# Patient Record
Sex: FEMALE | Race: BLACK OR AFRICAN AMERICAN | Employment: UNEMPLOYED | URBAN - METROPOLITAN AREA
[De-identification: names, ages, dates, MRNs, and addresses within clinical notes are randomized per-mention and may not be internally consistent; named-entity substitution may affect disease eponyms.]

---

## 2017-01-14 ENCOUNTER — HOSPITAL ENCOUNTER (EMERGENCY)
Age: 32
Discharge: HOME | End: 2017-01-14
Attending: EMERGENCY MEDICINE
Payer: MEDICAID

## 2017-01-14 VITALS
WEIGHT: 143 LBS | OXYGEN SATURATION: 100 % | TEMPERATURE: 98.3 F | RESPIRATION RATE: 19 BRPM | SYSTOLIC BLOOD PRESSURE: 99 MMHG | HEIGHT: 65 IN | DIASTOLIC BLOOD PRESSURE: 64 MMHG | HEART RATE: 72 BPM | BODY MASS INDEX: 23.82 KG/M2

## 2017-01-14 DIAGNOSIS — F17.200 SMOKER: ICD-10-CM

## 2017-01-14 DIAGNOSIS — J20.9 ACUTE BRONCHITIS, UNSPECIFIED ORGANISM: Primary | ICD-10-CM

## 2017-01-14 DIAGNOSIS — J45.21 MILD INTERMITTENT ASTHMA WITH ACUTE EXACERBATION: ICD-10-CM

## 2017-01-14 DIAGNOSIS — J02.9 SORE THROAT: ICD-10-CM

## 2017-01-14 LAB
APPEARANCE UR: ABNORMAL
BACTERIA URNS QL MICRO: NEGATIVE /HPF
BILIRUB UR QL: NEGATIVE
COLOR UR: ABNORMAL
DEPRECATED S PYO AG THROAT QL EIA: NEGATIVE
EPITH CASTS URNS QL MICRO: ABNORMAL /LPF
GLUCOSE UR STRIP.AUTO-MCNC: NEGATIVE MG/DL
HGB UR QL STRIP: NEGATIVE
KETONES UR QL STRIP.AUTO: NEGATIVE MG/DL
LEUKOCYTE ESTERASE UR QL STRIP.AUTO: ABNORMAL
NITRITE UR QL STRIP.AUTO: NEGATIVE
PH UR STRIP: 6 [PH] (ref 5–8)
PROT UR STRIP-MCNC: NEGATIVE MG/DL
RBC #/AREA URNS HPF: ABNORMAL /HPF (ref 0–5)
SP GR UR REFRACTOMETRY: 1.01 (ref 1–1.03)
UA: UC IF INDICATED,UAUC: ABNORMAL
UROBILINOGEN UR QL STRIP.AUTO: 0.2 EU/DL (ref 0.2–1)
WBC URNS QL MICRO: ABNORMAL /HPF (ref 0–4)

## 2017-01-14 PROCEDURE — 94640 AIRWAY INHALATION TREATMENT: CPT

## 2017-01-14 PROCEDURE — 74011000250 HC RX REV CODE- 250: Performed by: EMERGENCY MEDICINE

## 2017-01-14 PROCEDURE — 81001 URINALYSIS AUTO W/SCOPE: CPT | Performed by: EMERGENCY MEDICINE

## 2017-01-14 PROCEDURE — 87147 CULTURE TYPE IMMUNOLOGIC: CPT | Performed by: EMERGENCY MEDICINE

## 2017-01-14 PROCEDURE — 74011636637 HC RX REV CODE- 636/637: Performed by: EMERGENCY MEDICINE

## 2017-01-14 PROCEDURE — 94762 N-INVAS EAR/PLS OXIMTRY CONT: CPT

## 2017-01-14 PROCEDURE — 87070 CULTURE OTHR SPECIMN AEROBIC: CPT | Performed by: EMERGENCY MEDICINE

## 2017-01-14 PROCEDURE — 87880 STREP A ASSAY W/OPTIC: CPT | Performed by: EMERGENCY MEDICINE

## 2017-01-14 PROCEDURE — 77030013140 HC MSK NEB VYRM -A

## 2017-01-14 PROCEDURE — 99283 EMERGENCY DEPT VISIT LOW MDM: CPT

## 2017-01-14 RX ORDER — PREDNISONE 20 MG/1
20 TABLET ORAL DAILY
Qty: 5 TAB | Refills: 0 | Status: SHIPPED | OUTPATIENT
Start: 2017-01-14 | End: 2017-01-19

## 2017-01-14 RX ORDER — PROMETHAZINE HYDROCHLORIDE AND CODEINE PHOSPHATE 6.25; 1 MG/5ML; MG/5ML
5 SOLUTION ORAL
Qty: 75 ML | Refills: 0 | Status: SHIPPED | OUTPATIENT
Start: 2017-01-14 | End: 2017-01-25 | Stop reason: ALTCHOICE

## 2017-01-14 RX ORDER — FLUCONAZOLE 150 MG/1
150 TABLET ORAL ONCE
Qty: 1 TAB | Refills: 1 | Status: SHIPPED | OUTPATIENT
Start: 2017-01-14 | End: 2017-01-14

## 2017-01-14 RX ORDER — ALBUTEROL SULFATE 0.83 MG/ML
2.5 SOLUTION RESPIRATORY (INHALATION)
Qty: 24 EACH | Refills: 0 | Status: SHIPPED
Start: 2017-01-14 | End: 2017-01-14

## 2017-01-14 RX ORDER — IPRATROPIUM BROMIDE AND ALBUTEROL SULFATE 2.5; .5 MG/3ML; MG/3ML
3 SOLUTION RESPIRATORY (INHALATION)
Status: COMPLETED | OUTPATIENT
Start: 2017-01-14 | End: 2017-01-14

## 2017-01-14 RX ORDER — ALBUTEROL SULFATE 0.83 MG/ML
2.5 SOLUTION RESPIRATORY (INHALATION)
Qty: 24 EACH | Refills: 0 | Status: SHIPPED | OUTPATIENT
Start: 2017-01-14 | End: 2017-09-20

## 2017-01-14 RX ORDER — PREDNISONE 20 MG/1
60 TABLET ORAL
Status: COMPLETED | OUTPATIENT
Start: 2017-01-14 | End: 2017-01-14

## 2017-01-14 RX ORDER — PREDNISONE 20 MG/1
20 TABLET ORAL DAILY
Qty: 5 TAB | Refills: 0 | Status: SHIPPED | OUTPATIENT
Start: 2017-01-14 | End: 2017-01-14

## 2017-01-14 RX ORDER — AZITHROMYCIN 250 MG/1
TABLET, FILM COATED ORAL
Qty: 6 TAB | Refills: 0 | Status: SHIPPED | OUTPATIENT
Start: 2017-01-14 | End: 2017-01-25

## 2017-01-14 RX ORDER — AZITHROMYCIN 250 MG/1
TABLET, FILM COATED ORAL
Qty: 6 TAB | Refills: 0 | Status: SHIPPED | OUTPATIENT
Start: 2017-01-14 | End: 2017-01-14

## 2017-01-14 RX ADMIN — IPRATROPIUM BROMIDE AND ALBUTEROL SULFATE 3 ML: .5; 3 SOLUTION RESPIRATORY (INHALATION) at 07:46

## 2017-01-14 RX ADMIN — PREDNISONE 60 MG: 20 TABLET ORAL at 07:46

## 2017-01-14 NOTE — DISCHARGE INSTRUCTIONS
Learning About Asthma Triggers  What are asthma triggers? When you have asthma, certain things can make your symptoms worse. These are called triggers. Learn what triggers an asthma attack for you, and avoid the triggers when you can. Common triggers include colds, smoke, air pollution, dust, pollen, pets, stress, and cold air. How do asthma triggers affect you? Triggers can make it harder for your lungs to work as they should. They can lead to sudden breathing problems and other symptoms. When you are around a trigger, an asthma attack is more likely. If your symptoms are severe, you may need emergency treatment or have to go to the hospital for treatment. What can you do to avoid triggers? The first thing is to know your triggers. When you are having symptoms, note the things around you that might be causing them. Then look for patterns that may be triggering your symptoms. Record your triggers on a piece of paper or in an asthma diary. When you have your list of possible triggers, work with your doctor to find ways to avoid them. Avoid colds and flu. Get a pneumococcal vaccine shot. If you have had one before, ask your doctor whether you need a second dose. Get a flu vaccine every year, as soon as it's available. If you must be around people with colds or the flu, wash your hands often. Here are some ways to avoid a few common triggers. · Do not smoke or allow others to smoke around you. If you need help quitting, talk to your doctor about stop-smoking programs and medicines. These can increase your chances of quitting for good. · If there is a lot of pollution, pollen, or dust outside, stay at home and keep your windows closed. Use an air conditioner or air filter in your home. Check your local weather report or newspaper for air quality and pollen reports. What else should you know? · Take your controller medicine every day, not just when you have symptoms.  It helps prevent problems before they occur. · Your doctor may suggest that you check how well your lungs are working by measuring your peak expiratory flow (PEF) throughout the day. Your PEF may drop when you are near things that trigger symptoms. Where can you learn more? Go to http://jelani-daniel.info/. Enter Y422 in the search box to learn more about \"Learning About Asthma Triggers. \"  Current as of: May 23, 2016  Content Version: 11.1  © 8165-9554 [employer]. Care instructions adapted under license by [employer] (which disclaims liability or warranty for this information). If you have questions about a medical condition or this instruction, always ask your healthcare professional. [address] any warranty or liability for your use of this information. Bronchitis: Care Instructions  Your Care Instructions    Bronchitis is inflammation of the bronchial tubes, which carry air to the lungs. The tubes swell and produce mucus, or phlegm. The mucus and inflamed bronchial tubes make you cough. You may have trouble breathing. Most cases of bronchitis are caused by viruses like those that cause colds. Antibiotics usually do not help and they may be harmful. Bronchitis usually develops rapidly and lasts about 2 to 3 weeks in otherwise healthy people. Follow-up care is a key part of your treatment and safety. Be sure to make and go to all appointments, and call your doctor if you are having problems. It's also a good idea to know your test results and keep a list of the medicines you take. How can you care for yourself at home? · Take all medicines exactly as prescribed. Call your doctor if you think you are having a problem with your medicine. · Get some extra rest.  · Take an over-the-counter pain medicine, such as acetaminophen (Tylenol), ibuprofen (Advil, Motrin), or naproxen (Aleve) to reduce fever and relieve body aches.  Read and follow all instructions on the label.  · Do not take two or more pain medicines at the same time unless the doctor told you to. Many pain medicines have acetaminophen, which is Tylenol. Too much acetaminophen (Tylenol) can be harmful. · Take an over-the-counter cough medicine that contains dextromethorphan to help quiet a dry, hacking cough so that you can sleep. Avoid cough medicines that have more than one active ingredient. Read and follow all instructions on the label. · Breathe moist air from a humidifier, hot shower, or sink filled with hot water. The heat and moisture will thin mucus so you can cough it out. · Do not smoke. Smoking can make bronchitis worse. If you need help quitting, talk to your doctor about stop-smoking programs and medicines. These can increase your chances of quitting for good. When should you call for help? Call 911 anytime you think you may need emergency care. For example, call if:  · You have severe trouble breathing. Call your doctor now or seek immediate medical care if:  · You have new or worse trouble breathing. · You cough up dark brown or bloody mucus (sputum). · You have a new or higher fever. · You have a new rash. Watch closely for changes in your health, and be sure to contact your doctor if:  · You cough more deeply or more often, especially if you notice more mucus or a change in the color of your mucus. · You are not getting better as expected. Where can you learn more? Go to http://jelani-daniel.info/. Enter H333 in the search box to learn more about \"Bronchitis: Care Instructions. \"  Current as of: May 23, 2016  Content Version: 11.1  © 3741-2903 [employer]. Care instructions adapted under license by [employer] (which disclaims liability or warranty for this information).  If you have questions about a medical condition or this instruction, always ask your healthcare professional. [place] [address] any warranty or liability for your use of this information. Sore Throat: Care Instructions  Your Care Instructions    Infection by bacteria or a virus causes most sore throats. Cigarette smoke, dry air, air pollution, allergies, and yelling can also cause a sore throat. Sore throats can be painful and annoying. Fortunately, most sore throats go away on their own. If you have a bacterial infection, your doctor may prescribe antibiotics. Follow-up care is a key part of your treatment and safety. Be sure to make and go to all appointments, and call your doctor if you are having problems. It's also a good idea to know your test results and keep a list of the medicines you take. How can you care for yourself at home? · If your doctor prescribed antibiotics, take them as directed. Do not stop taking them just because you feel better. You need to take the full course of antibiotics. · Gargle with warm salt water once an hour to help reduce swelling and relieve discomfort. Use 1 teaspoon of salt mixed in 1 cup of warm water. · Take an over-the-counter pain medicine, such as acetaminophen (Tylenol), ibuprofen (Advil, Motrin), or naproxen (Aleve). Read and follow all instructions on the label. · Be careful when taking over-the-counter cold or flu medicines and Tylenol at the same time. Many of these medicines have acetaminophen, which is Tylenol. Read the labels to make sure that you are not taking more than the recommended dose. Too much acetaminophen (Tylenol) can be harmful. · Drink plenty of fluids. Fluids may help soothe an irritated throat. Hot fluids, such as tea or soup, may help decrease throat pain. · Use over-the-counter throat lozenges to soothe pain. Regular cough drops or hard candy may also help. These should not be given to young children because of the risk of choking. · Do not smoke or allow others to smoke around you. If you need help quitting, talk to your doctor about stop-smoking programs and medicines.  These can increase your chances of quitting for good. · Use a vaporizer or humidifier to add moisture to your bedroom. Follow the directions for cleaning the machine. When should you call for help? Call your doctor now or seek immediate medical care if:  · You have new or worse trouble swallowing. · Your sore throat gets much worse on one side. Watch closely for changes in your health, and be sure to contact your doctor if you do not get better as expected. Where can you learn more? Go to http://jelani-daniel.info/. Enter 062 [id number] in the search box to learn more about \"Sore Throat: Care Instructions. \"  Current as of: July 29, 2016  Content Version: 11.1  © 4828-6633 [employer]. Care instructions adapted under license by [employer] (which disclaims liability or warranty for this information). If you have questions about a medical condition or this instruction, always ask your healthcare professional. [place] [address] any warranty or liability for your use of this information. Stopping Smoking: Care Instructions  Your Care Instructions  Cigarette smokers crave the nicotine in cigarettes. Giving it up is much harder than simply changing a habit. Your body has to stop craving the nicotine. It is hard to quit, but you can do it. There are many tools that people use to quit smoking. You may find that combining tools works best for you. There are several steps to quitting. First you get ready to quit. Then you get support to help you. After that, you learn new skills and behaviors to become a nonsmoker. For many people, a necessary step is getting and using medicine. Your doctor will help you set up the plan that best meets your needs. You may want to attend a smoking cessation program to help you quit smoking. When you choose a program, look for one that has proven success. Ask your doctor for ideas.  You will greatly increase your chances of success if you take medicine as well as get counseling or join a cessation program.  Some of the changes you feel when you first quit tobacco are uncomfortable. Your body will miss the nicotine at first, and you may feel short-tempered and grumpy. You may have trouble sleeping or concentrating. Medicine can help you deal with these symptoms. You may struggle with changing your smoking habits and rituals. The last step is the tricky one: Be prepared for the smoking urge to continue for a time. This is a lot to deal with, but keep at it. You will feel better. Follow-up care is a key part of your treatment and safety. Be sure to make and go to all appointments, and call your doctor if you are having problems. Its also a good idea to know your test results and keep a list of the medicines you take. How can you care for yourself at home? · Ask your family, friends, and coworkers for support. You have a better chance of quitting if you have help and support. · Join a support group, such as Nicotine Anonymous, for people who are trying to quit smoking. · Consider signing up for a smoking cessation program, such as the American Lung Association's Freedom from Smoking program.  · Set a quit date. Pick your date carefully so that it is not right in the middle of a big deadline or stressful time. Once you quit, do not even take a puff. Get rid of all ashtrays and lighters after your last cigarette. Clean your house and your clothes so that they do not smell of smoke. · Learn how to be a nonsmoker. Think about ways you can avoid those things that make you reach for a cigarette. ¨ Avoid situations that put you at greatest risk for smoking. For some people, it is hard to have a drink with friends without smoking. For others, they might skip a coffee break with coworkers who smoke. ¨ Change your daily routine. Take a different route to work or eat a meal in a different place. · Cut down on stress.  Calm yourself or release tension by doing an activity you enjoy, such as reading a book, taking a hot bath, or gardening. · Talk to your doctor or pharmacist about nicotine replacement therapy, which replaces the nicotine in your body. You still get nicotine but you do not use tobacco. Nicotine replacement products help you slowly reduce the amount of nicotine you need. These products come in several forms, many of them available over-the-counter:  ¨ Nicotine patches  ¨ Nicotine gum and lozenges  ¨ Nicotine inhaler  · Ask your doctor about bupropion (Wellbutrin) or varenicline (Chantix), which are prescription medicines. They do not contain nicotine. They help you by reducing withdrawal symptoms, such as stress and anxiety. · Some people find hypnosis, acupuncture, and massage helpful for ending the smoking habit. · Eat a healthy diet and get regular exercise. Having healthy habits will help your body move past its craving for nicotine. · Be prepared to keep trying. Most people are not successful the first few times they try to quit. Do not get mad at yourself if you smoke again. Make a list of things you learned and think about when you want to try again, such as next week, next month, or next year. Where can you learn more? Go to http://jelani-daniel.info/. Enter K002 in the search box to learn more about \"Stopping Smoking: Care Instructions. \"  Current as of: May 26, 2016  Content Version: 11.1  © 5191-9415 [employer]. Care instructions adapted under license by [employer] (which disclaims liability or warranty for this information). If you have questions about a medical condition or this instruction, always ask your healthcare professional. [place] [address] any warranty or liability for your use of this information.

## 2017-01-14 NOTE — ED NOTES
Emergency Department Nursing Plan of Care       The Nursing Plan of Care is developed from the Nursing assessment and Emergency Department Attending provider initial evaluation. The plan of care may be reviewed in the ED Provider note.     The Plan of Care was developed with the following considerations:   Patient / Family readiness to learn indicated by:verbalized understanding  Persons(s) to be included in education: patient  Barriers to Learning/Limitations:No    Signed     Rupesh Godfrey RN    1/14/2017   7:42 AM

## 2017-01-14 NOTE — ED PROVIDER NOTES
HPI Comments: Ness Davis is a 32 y.o. female with PMHx significant for asthma, bipolar disorder, back pain, and scoliosis presenting ambulatory to [facility] ED with c/o a productive cough (yellow phlegm), wheezing, and a sore throat that began 2 days ago. She notes additional sx of a sharp 9/10 chest pain secondary to coughing. She states that she took mucinex DM with no relief of her sx. She reports that she has been on ABX for a week and expresses concern for a possible UTI. She notes experiencing white vaginal discharge. She denies fever, nausea, vomiting, or taking any medications today. PCP: Leeann Wang MD  PSHx: tonsillectomy  Social History:  (+) Tobacco (1ppd x 2 years),   (-) EtOH,      (-) Drugs     There are no other complaints, changes, or physical findings at this time. The history is provided by the patient. Past Medical History:   Diagnosis Date    Asthma     Back pain     Bipolar 1 disorder ([address]     Gastrointestinal disorder      IBS    Other ill-defined conditions(799.89)      Frequent Boils    Scoliosis        Past Surgical History:   Procedure Laterality Date    Hx tonsillectomy      Hx wisdom teeth extraction           Family History:   Problem Relation Age of Onset    Cancer Neg Hx     Hypertension Neg Hx     Diabetes Neg Hx     Stroke Neg Hx        Social History     Social History    Marital status: SINGLE     Spouse name: N/A    Number of children: N/A    Years of education: N/A     Occupational History    Not on file.      Social History Main Topics    Smoking status: Current Every Day Smoker     Packs/day: 1.00     Years: 2.00     Last attempt to quit: 8/5/2013    Smokeless tobacco: Never Used    Alcohol use No    Drug use: No    Sexual activity: Yes     Partners: Male     Birth control/ protection: Injection     Other Topics Concern    Not on file     Social History Narrative         ALLERGIES: Naprosyn [naproxen]    Review of Systems Constitutional: Negative. Negative for chills and fever. HENT: Positive for sore throat. Negative for congestion and rhinorrhea. Eyes: Negative. Negative for visual disturbance. Respiratory: Positive for cough and wheezing. Negative for shortness of breath. Cardiovascular: Negative. Negative for chest pain. Gastrointestinal: Negative. Negative for abdominal pain, nausea and vomiting. Genitourinary: Positive for vaginal discharge. Negative for difficulty urinating and dysuria. Musculoskeletal: Negative. Negative for arthralgias, back pain and neck pain. Skin: Negative. Negative for color change and rash. Neurological: Negative. Negative for dizziness, weakness and headaches. All other systems reviewed and are negative. Vitals:    01/14/17 0726 01/14/17 0756 01/14/17 0804   BP: 99/64     Pulse: 72     Resp: 19     Temp: 98.3 °F (36.8 °C)     SpO2: 100% 100% 100%   Weight: 64.9 kg (143 lb)     Height: 5' 5\" (1.651 m)              Physical Exam   Constitutional: She is oriented to person, place, and time. She appears well-developed and well-nourished. The pt is able to speak in full sentences. HENT:   Mouth/Throat: Posterior oropharyngeal edema (mild) present. Neck: Normal range of motion. Cardiovascular: Normal rate, regular rhythm, normal heart sounds and intact distal pulses. Pulmonary/Chest: Effort normal. She has wheezes (mild). She has rhonchi (scattered). Abdominal: Soft. Bowel sounds are normal.   Lymphadenopathy:     She has no cervical adenopathy. No adenopathy. Neurological: She is alert and oriented to person, place, and time. Skin: Skin is warm and dry. Nursing note and vitals reviewed.        MDM  Number of Diagnoses or Management Options  Acute bronchitis, unspecified organism:   Mild intermittent asthma with acute exacerbation:   Smoker:   Sore throat:   Diagnosis management comments:   DDx: Bronchitis, URI, PNA, yeast infection        Amount and/or Complexity of Data Reviewed  Clinical lab tests: ordered and reviewed  Review and summarize past medical records: yes    Patient Progress  Patient progress: stable    ED Course       Procedures     8:05 AM  The patient states that their symptoms have resolved and they feel much better. There are no other new complaints at this time. Her questions have been answered. 08:15 AM  Yue Galloway's final results have been reviewed with her. She has been counseled regarding her diagnosis. She verbally conveys understanding and agreement of the signs, symptoms, diagnosis, treatment and prognosis . She is stable for discharge. LABORATORY TESTS:  Recent Results (from the past 12 hour(s))   STREP AG SCREEN, GROUP A    Collection Time: 01/14/17  7:38 AM   Result Value Ref Range    Group A Strep Ag ID NEGATIVE  NEG     URINALYSIS W/ REFLEX CULTURE    Collection Time: 01/14/17  7:47 AM   Result Value Ref Range    Color YELLOW/STRAW      Appearance CLOUDY (A) CLEAR      Specific gravity 1.015 1.003 - 1.030      pH (UA) 6.0 5.0 - 8.0      Protein NEGATIVE  NEG mg/dL    Glucose NEGATIVE  NEG mg/dL    Ketone NEGATIVE  NEG mg/dL    Bilirubin NEGATIVE  NEG      Blood NEGATIVE  NEG      Urobilinogen 0.2 0.2 - 1.0 EU/dL    Nitrites NEGATIVE  NEG      Leukocyte Esterase SMALL (A) NEG      WBC 0-4 0 - 4 /hpf    RBC 0-5 0 - 5 /hpf    Epithelial cells FEW FEW /lpf    Bacteria NEGATIVE  NEG /hpf    UA:UC IF INDICATED CULTURE NOT INDICATED BY UA RESULT CNI       MEDICATIONS GIVEN:  Medications   albuterol-ipratropium (DUO-NEB) 2.5 MG-0.5 MG/3 ML (3 mL Nebulization Given 1/14/17 0746)   predniSONE (DELTASONE) tablet 60 mg (60 mg Oral Given 1/14/17 0746)       IMPRESSION:  1. Acute bronchitis, unspecified organism    2. Mild intermittent asthma with acute exacerbation    3. Sore throat    4. Smoker        PLAN:  1.    Current Discharge Medication List      START taking these medications    Details   promethazine-codeine (PHENERGAN WITH CODEINE) 6.25-10 mg/5 mL syrup Take 5 mL by mouth four (4) times daily as needed for Cough. Max Daily Amount: 20 mL. Indications: COLD SYMPTOMS, COUGH, Nasal Congestion  Qty: 75 mL, Refills: 0      azithromycin (ZITHROMAX Z-LUCIO) 250 mg tablet tad  Qty: 6 Tab, Refills: 0      fluconazole (DIFLUCAN) 150 mg tablet Take 1 Tab by mouth once for 1 dose. May repeat dose in 7days  Indications: yeast  Qty: 1 Tab, Refills: 1         CONTINUE these medications which have NOT CHANGED    Details   QUEtiapine (SEROQUEL) 50 mg tablet       nicotine (NICODERM CQ) 21 mg/24 hr       medroxyPROGESTERone (DEPO-PROVERA) 150 mg/mL injection 1 mL by IntraMUSCular route every three (3) months. Qty: 1 mL, Refills: 3    Comments: Do NOT fill till 10/6/16  Associated Diagnoses: Irregular menses      ALPRAZolam (XANAX) 1 mg tablet Take 1 mg by mouth two (2) times a day. Indications: ANXIETY      albuterol (PROVENTIL HFA, VENTOLIN HFA, PROAIR HFA) 90 mcg/actuation inhaler Take 2 Puffs by inhalation every six (6) hours as needed for Wheezing. OXcarbazepine (TRILEPTAL) 300 mg tablet Please take 1 tablet twice a day for 4 days and then take three times a day. Qty: 90 Tab, Refills: 0         STOP taking these medications       traMADol (ULTRAM) 50 mg tablet Comments:   Reason for Stoppin.   Follow-up Information     Follow up With Details Comments Contact Info    [facility] EMERGENCY DEPT  If symptoms worsen [address]    David Parker MD Schedule an appointment as soon as possible for a visit If symptoms worsen next week [facility]  [address] [facility] [address]  [phone]          Return to ED if worse     DISCHARGE NOTE  8:17 AM  The patient has been re-evaluated and is ready for discharge. Reviewed available results with patient. Counseled pt on diagnosis and care plan. Pt has expressed understanding, and all questions have been answered.  Pt agrees with plan and agrees to F/U as recommended, or return to the ED if their sxs worsen. Discharge instructions have been provided and explained to the pt, along with reasons to return to the ED. This note is prepared by Nusrat Solis, acting as Scribe for Verena Ely MD.    Verena Ely MD: The scribe's documentation has been prepared under my direction and personally reviewed by me in its entirety. I confirm that the note above accurately reflects all work, treatment, procedures, and medical decision making performed by me.

## 2017-01-14 NOTE — ED NOTES
Per pt reports productive cough with yellow thick sputum x 2 days, chills, wheezing and vaginal discharge that is white and thick in color after taking antibiotics for 1 week. Pt isn't concerned about STD and states that it's a yeast infection. Pt denies SOB and CP.

## 2017-01-14 NOTE — LETTER
[facility] EMERGENCY DEPT 
[address] [facility] [address] [id number] [phone] Work/School Note Date: 1/14/2017 To Whom It May concern: 
 
Dylna Cruz was seen and treated today in the emergency room by the following provider(s): 
Attending Provider: eSlvin Marrero MD.   
 
Dylan Cruz may return to work on 01/18/2017. Sincerely, Selvin Marrero MD

## 2017-01-16 LAB
BACTERIA SPEC CULT: NORMAL
BACTERIA SPEC CULT: NORMAL
SERVICE CMNT-IMP: NORMAL

## 2017-01-25 ENCOUNTER — HOSPITAL ENCOUNTER (EMERGENCY)
Age: 32
Discharge: HOME | End: 2017-01-25
Attending: EMERGENCY MEDICINE
Payer: MEDICAID

## 2017-01-25 VITALS
OXYGEN SATURATION: 98 % | BODY MASS INDEX: 24.16 KG/M2 | HEART RATE: 90 BPM | WEIGHT: 145 LBS | SYSTOLIC BLOOD PRESSURE: 99 MMHG | TEMPERATURE: 98.7 F | HEIGHT: 65 IN | RESPIRATION RATE: 16 BRPM | DIASTOLIC BLOOD PRESSURE: 70 MMHG

## 2017-01-25 DIAGNOSIS — S80.11XA CONTUSION OF CALF, RIGHT, INITIAL ENCOUNTER: ICD-10-CM

## 2017-01-25 DIAGNOSIS — S80.12XA CONTUSION OF CALF, LEFT, INITIAL ENCOUNTER: Primary | ICD-10-CM

## 2017-01-25 PROCEDURE — 99282 EMERGENCY DEPT VISIT SF MDM: CPT

## 2017-01-25 RX ORDER — TRAMADOL HYDROCHLORIDE 50 MG/1
50 TABLET ORAL
Qty: 12 TAB | Refills: 0 | Status: SHIPPED | OUTPATIENT
Start: 2017-01-25 | End: 2017-07-25
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
## 2017-01-25 NOTE — ED NOTES
Patient (s)  given copy of dc instructions and 1 paper script(s) and 0 electronic scripts. Patient (s)  verbalized understanding of instructions and script (s). Patient given a current medication reconciliation form and verbalized understanding of their medications. Patient (s) verbalized understanding of the importance of discussing medications with  his or her physician or clinic they will be following up with. Patient alert and oriented and in no acute distress. Patient discharged home ambulatory. Ice packs provided at discharge.

## 2017-01-25 NOTE — DISCHARGE INSTRUCTIONS
Bruises: Care Instructions  Your Care Instructions    Bruises occur when small blood vessels under the skin tear or rupture, most often from a twist, bump, or fall. Blood leaks into tissues under the skin and causes a black-and-blue spot that often turns colors, including purplish black, reddish blue, or yellowish green, as the bruise heals. Bruises hurt, but most are not serious and will go away on their own within 2 to 4 weeks. Sometimes, gravity causes them to spread down the body. A leg bruise usually will take longer to heal than a bruise on the face or arms. Follow-up care is a key part of your treatment and safety. Be sure to make and go to all appointments, and call your doctor if you are having problems. Its also a good idea to know your test results and keep a list of the medicines you take. How can you care for yourself at home? · Take pain medicines exactly as directed. ¨ If the doctor gave you a prescription medicine for pain, take it as prescribed. ¨ If you are not taking a prescription pain medicine, ask your doctor if you can take an over-the-counter medicine. · Put ice or a cold pack on the area for 10 to 20 minutes at a time. Put a thin cloth between the ice and your skin. · If you can, prop up the bruised area on pillows as much as possible for the next few days. Try to keep the bruise above the level of your heart. When should you call for help? Call your doctor now or seek immediate medical care if:  · You have signs of infection, such as:  ¨ Increased pain, swelling, warmth, or redness. ¨ Red streaks leading from the bruise. ¨ Pus draining from the bruise. ¨ A fever. · You have a bruise on your leg and signs of a blood clot, such as:  ¨ Increasing redness and swelling along with warmth, tenderness, and pain in the bruised area. ¨ Pain in your calf, back of the knee, thigh, or groin. ¨ Redness and swelling in your leg or groin. · Your pain gets worse.   Watch closely for changes in your health, and be sure to contact your doctor if:  · You do not get better as expected. Where can you learn more? Go to http://jelani-daniel.info/. Enter [phone] [phone] in the search box to learn more about \"Bruises: Care Instructions. \"  Current as of: May 27, 2016  Content Version: 11.1  © 8163-0544 [employer]. Care instructions adapted under license by [employer] (which disclaims liability or warranty for this information). If you have questions about a medical condition or this instruction, always ask your healthcare professional. [place] [address] any warranty or liability for your use of this information.
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
## 2017-02-08 ENCOUNTER — OFFICE VISIT (OUTPATIENT)
Dept: INTERNAL MEDICINE CLINIC | Age: 32
End: 2017-02-08

## 2017-02-08 VITALS
DIASTOLIC BLOOD PRESSURE: 80 MMHG | HEIGHT: 65 IN | SYSTOLIC BLOOD PRESSURE: 110 MMHG | RESPIRATION RATE: 18 BRPM | HEART RATE: 72 BPM | WEIGHT: 143.8 LBS | BODY MASS INDEX: 23.96 KG/M2 | OXYGEN SATURATION: 100 % | TEMPERATURE: 98 F

## 2017-02-08 DIAGNOSIS — F31.9 BIPOLAR 1 DISORDER (HCC): Primary | ICD-10-CM

## 2017-02-08 RX ORDER — OXCARBAZEPINE 300 MG/1
300 TABLET, FILM COATED ORAL 3 TIMES DAILY
Qty: 90 TAB | Refills: 5 | Status: SHIPPED | OUTPATIENT
Start: 2017-02-08 | End: 2019-08-16 | Stop reason: SDUPTHER

## 2017-02-08 RX ORDER — QUETIAPINE FUMARATE 50 MG/1
TABLET, FILM COATED ORAL
Qty: 45 TAB | Refills: 5 | Status: SHIPPED | OUTPATIENT
Start: 2017-02-08 | End: 2017-09-20

## 2017-02-08 RX ORDER — ALPRAZOLAM 1 MG/1
1 TABLET ORAL 2 TIMES DAILY
Qty: 60 TAB | Refills: 0 | Status: SHIPPED | OUTPATIENT
Start: 2017-02-08 | End: 2017-03-08 | Stop reason: SDUPTHER

## 2017-02-08 NOTE — MR AVS SNAPSHOT
Visit Information Date & Time Provider Department Dept. Phone Encounter #  
 2/8/2017  9:15  [facility]  [id number] Follow-up Instructions Return in about 4 weeks (around 3/8/2017) for pain med. Upcoming Health Maintenance Date Due Pneumococcal 19-64 Medium Risk (1 of 1 - PPSV23) 8/3/2004 PAP AKA CERVICAL CYTOLOGY 4/22/2017 DTaP/Tdap/Td series (2 - Td) 5/21/2026 Allergies as of 2/8/2017  Review Complete On: 2/8/2017 By: 200 Chloé Chapman MD  
  
 Severity Noted Reaction Type Reactions Naprosyn [Naproxen] Medium 12/29/2013    Nausea and Vomiting Current Immunizations  Reviewed on 5/10/2013 Name Date  
 TD Vaccine 11/19/2012  3:24 AM  
 Tdap 5/21/2016  9:17 AM  
  
 Not reviewed this visit You Were Diagnosed With   
  
 Codes Comments Bipolar 1 disorder (RUST 75.)    -  Primary ICD-10-CM: F31.9 ICD-9-CM: 296.7 Vitals BP Pulse Temp Resp Height(growth percentile) Weight(growth percentile) 110/80 (BP 1 Location: Left arm, BP Patient Position: Sitting) 72 98 °F (36.7 °C) (Oral) 18 5' 5\" (1.651 m) 143 lb 12.8 oz (65.2 kg) SpO2 BMI OB Status Smoking Status 100% 23.93 kg/m2 Injection Current Every Day Smoker Vitals History BMI and BSA Data Body Mass Index Body Surface Area  
 23.93 kg/m 2 1.73 m 2 Preferred Pharmacy Pharmacy Name Phone [email or web address], [address] [phone] Your Updated Medication List  
  
   
This list is accurate as of: 2/8/17 10:09 AM.  Always use your most recent med list.  
  
  
  
  
 albuterol 2.5 mg /3 mL (0.083 %) nebulizer solution Commonly known as:  PROVENTIL VENTOLIN  
3 mL by Nebulization route every four (4) hours as needed for Wheezing or Shortness of Breath. ALPRAZolam 1 mg tablet Commonly known as:  [place] Tri Take 1 Tab by mouth two (2) times a day. Max Daily Amount: 2 mg. Indications: ANXIETY  
  
 medroxyPROGESTERone 150 mg/mL injection Commonly known as:  DEPO-PROVERA  
1 mL by IntraMUSCular route every three (3) months. nicotine 21 mg/24 hr  
Commonly known as:  [employer] OXcarbazepine 300 mg tablet Commonly known as:  TRILEPTAL Take 1 Tab by mouth three (3) times daily. Please take 1 tablet twice a day for 4 days and then take three times a day. QUEtiapine 50 mg tablet Commonly known as:  SEROquel Take 1/2 tab in am and 1 tab in pm  
  
 traMADol 50 mg tablet Commonly known as:  ULTRAM  
Take 1 Tab by mouth every six (6) hours as needed for Pain. Max Daily Amount: 200 mg. Prescriptions Printed Refills ALPRAZolam (XANAX) 1 mg tablet 0 Sig: Take 1 Tab by mouth two (2) times a day. Max Daily Amount: 2 mg. Indications: ANXIETY Class: Print Route: Oral  
  
Prescriptions Sent to Pharmacy Refills QUEtiapine (SEROQUEL) 50 mg tablet 5 Sig: Take 1/2 tab in am and 1 tab in pm  
 Class: Normal  
 Pharmacy: [email or web address] [address] Ph #: [phone] OXcarbazepine (TRILEPTAL) 300 mg tablet 5 Sig: Take 1 Tab by mouth three (3) times daily. Please take 1 tablet twice a day for 4 days and then take three times a day. Class: Normal  
 Pharmacy: [email or web address] [address] Ph #: [phone] Route: Oral  
  
We Performed the Following PAIN MGMT PANEL W/REFL, UR [[id number] Custom] Follow-up Instructions Return in about 4 weeks (around 3/8/2017) for pain med. Introducing [facility]! Romayne Duster introduces [employer] patient portal. Now you can access parts of your medical record, email your doctor's office, and request medication refills online. 1. In your internet browser, go to [email or web address] 2. Click on the First Time User? Click Here link in the Sign In box.  You will see the New Member Sign Up page. 3. Enter your [employer] Access Code exactly as it appears below. You will not need to use this code after youve completed the sign-up process. If you do not sign up before the expiration date, you must request a new code. · [employer] Access Code: [email or web address] Expires: 4/14/2017  7:20 AM 
 
4. Enter the last four digits of your Social Security Number (xxxx) and Date of Birth (mm/dd/yyyy) as indicated and click Submit. You will be taken to the next sign-up page. 5. Create a [employer] ID. This will be your [employer] login ID and cannot be changed, so think of one that is secure and easy to remember. 6. Create a [employer] password. You can change your password at any time. 7. Enter your Password Reset Question and Answer. This can be used at a later time if you forget your password. 8. Enter your e-mail address. You will receive e-mail notification when new information is available in [address]. 9. Click Sign Up. You can now view and download portions of your medical record. 10. Click the Download Summary menu link to download a portable copy of your medical information. If you have questions, please visit the Frequently Asked Questions section of the [employer] website. Remember, [employer] is NOT to be used for urgent needs. For medical emergencies, dial 911. Now available from your iPhone and Android! Please provide this summary of care documentation to your next provider. Your primary care clinician is listed as Shayy Palumbo. If you have any questions after today's visit, please call [phone].

## 2017-02-08 NOTE — PROGRESS NOTES
Dylan Cruz is a 32 y.o. female and presents with Medication Refill  . Pt's psychiatrist has retired and she needs refills on her meds. She is trying to find a new psychiatrist. She has 2 more days of medicine. Review of Systems  Constitutional: negative for fevers, chills, anorexia and weight loss  Eyes:   negative for visual disturbance and irritation  ENT:   negative for tinnitus,sore throat,nasal congestion,ear pains. hoarseness  Respiratory:  negative for cough, hemoptysis, dyspnea,wheezing  CV:   negative for chest pain, palpitations, lower extremity edema  GI:   negative for nausea, vomiting, diarrhea, abdominal pain,melena  Endo:               negative for polyuria,polydipsia,polyphagia,heat intolerance  Genitourinary: negative for frequency, dysuria and hematuria  Integument:  negative for rash and pruritus  Hematologic:  negative for easy bruising and gum/nose bleeding  Musculoskel: negative for myalgias, arthralgias, back pain, muscle weakness, joint pain  Neurological:  negative for headaches, dizziness, vertigo, memory problems and gait   Behavl/Psych: negative for feelings of anxiety, depression, mood changes    Past Medical History   Diagnosis Date    Asthma     Back pain     Bipolar 1 disorder (HCC)     Gastrointestinal disorder      IBS    Other ill-defined conditions(799.89)      Frequent Boils    Scoliosis      Past Surgical History   Procedure Laterality Date    Hx tonsillectomy      Hx wisdom teeth extraction       Social History     Social History    Marital status: SINGLE     Spouse name: N/A    Number of children: N/A    Years of education: N/A     Social History Main Topics    Smoking status: Current Every Day Smoker     Packs/day: 0.50     Years: 2.00     Last attempt to quit: 8/5/2013    Smokeless tobacco: Never Used    Alcohol use No    Drug use: No    Sexual activity: Yes     Partners: Male     Birth control/ protection: Injection     Other Topics Concern    None     Social History Narrative     Current Outpatient Prescriptions   Medication Sig Dispense Refill    QUEtiapine (SEROQUEL) 50 mg tablet Take 1/2 tab in am and 1 tab in pm 45 Tab 5    ALPRAZolam (XANAX) 1 mg tablet Take 1 Tab by mouth two (2) times a day. Max Daily Amount: 2 mg. Indications: ANXIETY 60 Tab 0    OXcarbazepine (TRILEPTAL) 300 mg tablet Take 1 Tab by mouth three (3) times daily. Please take 1 tablet twice a day for 4 days and then take three times a day. 90 Tab 5    albuterol (PROVENTIL VENTOLIN) 2.5 mg /3 mL (0.083 %) nebulizer solution 3 mL by Nebulization route every four (4) hours as needed for Wheezing or Shortness of Breath. 24 Each 0    medroxyPROGESTERone (DEPO-PROVERA) 150 mg/mL injection 1 mL by IntraMUSCular route every three (3) months. 1 mL 3    traMADol (ULTRAM) 50 mg tablet Take 1 Tab by mouth every six (6) hours as needed for Pain. Max Daily Amount: 200 mg. 12 Tab 0    nicotine (NICODERM CQ) 21 mg/24 hr        Allergies   Allergen Reactions    Naprosyn [Naproxen] Nausea and Vomiting       Objective:  Visit Vitals    /80 (BP 1 Location: Left arm, BP Patient Position: Sitting)    Pulse 72    Temp 98 °F (36.7 °C) (Oral)    Resp 18    Ht 5' 5\" (1.651 m)    Wt 143 lb 12.8 oz (65.2 kg)    SpO2 100%    BMI 23.93 kg/m2     Physical Exam:   General appearance - alert, well appearing, and in no distress  Mental status - alert, oriented to person, place, and time  Chest - clear to auscultation, no wheezes, rales or rhonchi, symmetric air entry   Heart - normal rate, regular rhythm, normal S1, S2, no murmurs, rubs, clicks or gallops   Abdomen - soft, nontender, nondistended, no masses or organomegaly  Lymph- no adenopathy palpable  Ext-peripheral pulses normal, no pedal edema, no clubbing or cyanosis  Skin-Warm and dry.  no hyperpigmentation, vitiligo, or suspicious lesions  Neuro -alert, oriented, normal speech, no focal findings or movement disorder noted    Assessment/Plan:    ICD-10-CM ICD-9-CM    1. Bipolar 1 disorder ([facility]) F31.9 296.7 QUEtiapine (SEROQUEL) 50 mg tablet      ALPRAZolam (XANAX) 1 mg tablet      OXcarbazepine (TRILEPTAL) 300 mg tablet      PAIN MGMT PANEL W/REFL, UR     Orders Placed This Encounter    PAIN MGMT PANEL W/REFL, UR    QUEtiapine (SEROQUEL) 50 mg tablet     Sig: Take 1/2 tab in am and 1 tab in pm     Dispense:  45 Tab     Refill:  5    ALPRAZolam (XANAX) 1 mg tablet     Sig: Take 1 Tab by mouth two (2) times a day. Max Daily Amount: 2 mg. Indications: ANXIETY     Dispense:  60 Tab     Refill:  0    OXcarbazepine (TRILEPTAL) 300 mg tablet     Sig: Take 1 Tab by mouth three (3) times daily. Please take 1 tablet twice a day for 4 days and then take three times a day. Dispense:  90 Tab     Refill:  5     Bipolar disorder- Refilled psych meds. Told pt she needs to work on finding a new psychiatrist.  I will only prescribe xanax for 3 months. UDS. Follow-up Disposition:  Return in about 4 weeks (around 3/8/2017) for pain med.

## 2017-02-08 NOTE — PROGRESS NOTES
1. Have you been to the ER, urgent care clinic since your last visit? Hospitalized since your last visit? Yes When: 1/25/17 [facility] leg pain. 2. Have you seen or consulted any other health care providers outside of the [address] [place] [address] since your last visit? Include any pap smears or colon screening.  No.

## 2017-02-13 LAB
AMPHETAMINES UR QL SCN: NEGATIVE NG/ML
BARBITURATES UR QL SCN: NEGATIVE NG/ML
BENZODIAZ UR QL: POSITIVE NG/ML
BZE UR QL SCN: NEGATIVE NG/ML
CANNABINOIDS UR QL CFM: POSITIVE
CREAT UR-MCNC: 95.6 MG/DL (ref 20–300)
FENTANYL+NORFENTANYL UR QL SCN: NEGATIVE PG/ML
MEPERIDINE UR QL: NEGATIVE NG/ML
METHADONE UR QL SCN: NEGATIVE NG/ML
OPIATES UR QL SCN: NEGATIVE NG/ML
OXYCODONE+OXYMORPHONE UR QL SCN: NEGATIVE NG/ML
PCP UR QL: NEGATIVE NG/ML
PH UR: 7.2 [PH] (ref 4.5–8.9)
PLEASE NOTE:, 733157: ABNORMAL
PROPOXYPH UR QL SCN: NEGATIVE NG/ML
SP GR UR: 1.01
TRAMADOL UR QL SCN: NEGATIVE NG/ML

## 2017-03-08 ENCOUNTER — OFFICE VISIT (OUTPATIENT)
Dept: INTERNAL MEDICINE CLINIC | Age: 32
End: 2017-03-08

## 2017-03-08 VITALS
HEART RATE: 96 BPM | SYSTOLIC BLOOD PRESSURE: 115 MMHG | OXYGEN SATURATION: 98 % | TEMPERATURE: 98.8 F | HEIGHT: 65 IN | RESPIRATION RATE: 20 BRPM | BODY MASS INDEX: 23.59 KG/M2 | WEIGHT: 141.6 LBS | DIASTOLIC BLOOD PRESSURE: 78 MMHG

## 2017-03-08 DIAGNOSIS — F31.9 BIPOLAR 1 DISORDER (HCC): ICD-10-CM

## 2017-03-08 DIAGNOSIS — J06.9 ACUTE URI: Primary | ICD-10-CM

## 2017-03-08 LAB
QUICKVUE INFLUENZA TEST: NEGATIVE
VALID INTERNAL CONTROL?: YES

## 2017-03-08 RX ORDER — ALPRAZOLAM 1 MG/1
1 TABLET ORAL 2 TIMES DAILY
Qty: 60 TAB | Refills: 0 | Status: SHIPPED | OUTPATIENT
Start: 2017-03-08 | End: 2017-09-20

## 2017-03-08 NOTE — MR AVS SNAPSHOT
Visit Information Date & Time Provider Department Dept. Phone Encounter #  
 3/8/2017  9:45 AM Cristino Chapman, [id number]  [id number] Upcoming Health Maintenance Date Due Pneumococcal 19-64 Medium Risk (1 of 1 - PPSV23) 8/3/2004 PAP AKA CERVICAL CYTOLOGY 4/22/2017 DTaP/Tdap/Td series (2 - Td) 5/21/2026 Allergies as of 3/8/2017  Review Complete On: 3/8/2017 By: 200 Chloé Chapman MD  
  
 Severity Noted Reaction Type Reactions Naprosyn [Naproxen] Medium 12/29/2013    Nausea and Vomiting Current Immunizations  Reviewed on 5/10/2013 Name Date  
 TD Vaccine 11/19/2012  3:24 AM  
 Tdap 5/21/2016  9:17 AM  
  
 Not reviewed this visit You Were Diagnosed With   
  
 Codes Comments Acute URI    -  Primary ICD-10-CM: J06.9 ICD-9-CM: 465.9 Bipolar 1 disorder (HCC)     ICD-10-CM: F31.9 ICD-9-CM: 296.7 Vitals BP Pulse Temp Resp Height(growth percentile) Weight(growth percentile) 115/78 (BP 1 Location: Right arm, BP Patient Position: Sitting) 96 98.8 °F (37.1 °C) (Oral) 20 5' 5\" (1.651 m) 141 lb 9.6 oz (64.2 kg) SpO2 BMI OB Status Smoking Status 98% 23.56 kg/m2 Injection Current Every Day Smoker Vitals History BMI and BSA Data Body Mass Index Body Surface Area  
 23.56 kg/m 2 1.72 m 2 Preferred Pharmacy Pharmacy Name Phone [email or web address] [place], [address] [phone] Your Updated Medication List  
  
   
This list is accurate as of: 3/8/17 11:09 AM.  Always use your most recent med list.  
  
  
  
  
 albuterol 2.5 mg /3 mL (0.083 %) nebulizer solution Commonly known as:  PROVENTIL VENTOLIN  
3 mL by Nebulization route every four (4) hours as needed for Wheezing or Shortness of Breath. ALPRAZolam 1 mg tablet Commonly known as:  Atif Sequin Take 1 Tab by mouth two (2) times a day. Max Daily Amount: 2 mg. Indications: ANXIETY medroxyPROGESTERone 150 mg/mL injection Commonly known as:  DEPO-PROVERA  
1 mL by IntraMUSCular route every three (3) months. nicotine 21 mg/24 hr  
Commonly known as:  Rin [place] OXcarbazepine 300 mg tablet Commonly known as:  TRILEPTAL Take 1 Tab by mouth three (3) times daily. Please take 1 tablet twice a day for 4 days and then take three times a day. QUEtiapine 50 mg tablet Commonly known as:  SEROquel Take 1/2 tab in am and 1 tab in pm  
  
 traMADol 50 mg tablet Commonly known as:  ULTRAM  
Take 1 Tab by mouth every six (6) hours as needed for Pain. Max Daily Amount: 200 mg. Prescriptions Printed Refills ALPRAZolam (XANAX) 1 mg tablet 0 Sig: Take 1 Tab by mouth two (2) times a day. Max Daily Amount: 2 mg. Indications: ANXIETY Class: Print Route: Oral  
  
We Performed the Following AMB POC RAPID INFLUENZA TEST [92785 CPT(R)] PAIN MGMT PANEL W/REFL, UR [[id number] Custom] Introducing [facility]! [employer] introduces Edgeio patient portal. Now you can access parts of your medical record, email your doctor's office, and request medication refills online. 1. In your internet browser, go to [email or web address] 2. Click on the First Time User? Click Here link in the Sign In box. You will see the New Member Sign Up page. 3. Enter your Edgeio Access Code exactly as it appears below. You will not need to use this code after youve completed the sign-up process. If you do not sign up before the expiration date, you must request a new code. · Edgeio Access Code: [email or web address] Expires: 4/14/2017  7:20 AM 
 
4. Enter the last four digits of your Social Security Number (xxxx) and Date of Birth (mm/dd/yyyy) as indicated and click Submit. You will be taken to the next sign-up page. 5. Create a Edgeio ID.  This will be your Edgeio login ID and cannot be changed, so think of one that is secure and easy to remember. 6. Create a Moncai password. You can change your password at any time. 7. Enter your Password Reset Question and Answer. This can be used at a later time if you forget your password. 8. Enter your e-mail address. You will receive e-mail notification when new information is available in [address] 9. Click Sign Up. You can now view and download portions of your medical record. 10. Click the Download Summary menu link to download a portable copy of your medical information. If you have questions, please visit the Frequently Asked Questions section of the Moncai website. Remember, Moncai is NOT to be used for urgent needs. For medical emergencies, dial 911. Now available from your iPhone and Android! Please provide this summary of care documentation to your next provider. Your primary care clinician is listed as Tammie Leon. If you have any questions after today's visit, please call [phone].

## 2017-03-08 NOTE — PROGRESS NOTES
Seth Wall is a 32 y.o. female and presents with Medication Refill and Follow-up  . Last xanax was yest.   Has psych appt is in 1st wk, of April. (April 5th?)    C/o cough, sore throat, and SOB x 3 days. Subjective fever and chills. +aches. +sick contacts. Review of Systems  Constitutional: negative for fevers, chills, anorexia and weight loss  Eyes:   negative for visual disturbance and irritation  ENT:   negative for tinnitus,nasal congestion,ear pains. hoarseness  Respiratory:  negative for  hemoptysis,wheezing  CV:   negative for chest pain, palpitations, lower extremity edema  GI:   negative for nausea, vomiting, diarrhea, abdominal pain,melena  Endo:               negative for polyuria,polydipsia,polyphagia,heat intolerance  Genitourinary: negative for frequency, dysuria and hematuria  Integument:  negative for rash and pruritus  Hematologic:  negative for easy bruising and gum/nose bleeding  Musculoskel: negative for myalgias, arthralgias, back pain, muscle weakness, joint pain  Neurological:  negative for headaches, dizziness, vertigo, memory problems and gait   Behavl/Psych: negative for feelings of anxiety, depression, mood changes    Past Medical History:   Diagnosis Date    Asthma     Back pain     Bipolar 1 disorder (HCC)     Gastrointestinal disorder     IBS    Other ill-defined conditions(799.89)     Frequent Boils    Scoliosis      Past Surgical History:   Procedure Laterality Date    HX TONSILLECTOMY      HX WISDOM TEETH EXTRACTION       Social History     Social History    Marital status: SINGLE     Spouse name: N/A    Number of children: N/A    Years of education: N/A     Social History Main Topics    Smoking status: Current Every Day Smoker     Packs/day: 0.50     Years: 2.00     Last attempt to quit: 8/5/2013    Smokeless tobacco: Never Used    Alcohol use No    Drug use: No    Sexual activity: Yes     Partners: Male     Birth control/ protection: Injection     Other Topics Concern    None     Social History Narrative     Current Outpatient Prescriptions   Medication Sig Dispense Refill    ALPRAZolam (XANAX) 1 mg tablet Take 1 Tab by mouth two (2) times a day. Max Daily Amount: 2 mg. Indications: ANXIETY 60 Tab 0    QUEtiapine (SEROQUEL) 50 mg tablet Take 1/2 tab in am and 1 tab in pm 45 Tab 5    OXcarbazepine (TRILEPTAL) 300 mg tablet Take 1 Tab by mouth three (3) times daily. Please take 1 tablet twice a day for 4 days and then take three times a day. 90 Tab 5    albuterol (PROVENTIL VENTOLIN) 2.5 mg /3 mL (0.083 %) nebulizer solution 3 mL by Nebulization route every four (4) hours as needed for Wheezing or Shortness of Breath. 24 Each 0    medroxyPROGESTERone (DEPO-PROVERA) 150 mg/mL injection 1 mL by IntraMUSCular route every three (3) months. 1 mL 3    traMADol (ULTRAM) 50 mg tablet Take 1 Tab by mouth every six (6) hours as needed for Pain. Max Daily Amount: 200 mg. 12 Tab 0    nicotine (NICODERM CQ) 21 mg/24 hr        Allergies   Allergen Reactions    Naprosyn [Naproxen] Nausea and Vomiting       Objective:  Visit Vitals    /78 (BP 1 Location: Right arm, BP Patient Position: Sitting)    Pulse 96    Temp 98.8 °F (37.1 °C) (Oral)    Resp 20    Ht 5' 5\" (1.651 m)    Wt 141 lb 9.6 oz (64.2 kg)    SpO2 98%    BMI 23.56 kg/m2     Physical Exam:   General appearance - alert, well appearing, and in no distress  Mental status - alert, oriented to person, place, and time  Chest - clear to auscultation, no wheezes, rales or rhonchi, symmetric air entry   Heart - normal rate, regular rhythm, normal S1, S2, no murmurs, rubs, clicks or gallops   Abdomen - soft, nontender, nondistended, no masses or organomegaly  Lymph- no adenopathy palpable  Ext-peripheral pulses normal, no pedal edema, no clubbing or cyanosis  Skin-Warm and dry.  no hyperpigmentation, vitiligo, or suspicious lesions  Neuro -alert, oriented, normal speech, no focal findings or movement disorder noted      Results for orders placed or performed in visit on 03/08/17   AMB POC RAPID INFLUENZA TEST   Result Value Ref Range    VALID INTERNAL CONTROL POC Yes     QuickVue Influenza test Negative Negative       Assessment/Plan:    ICD-10-CM ICD-9-CM    1. Acute URI J06.9 465.9 AMB POC RAPID INFLUENZA TEST   2. Bipolar 1 disorder (HCC) F31.9 296.7 ALPRAZolam (XANAX) 1 mg tablet      PAIN MGMT PANEL W/REFL, UR     Orders Placed This Encounter    PAIN MGMT PANEL W/REFL, UR    AMB POC RAPID INFLUENZA TEST    ALPRAZolam (XANAX) 1 mg tablet     Sig: Take 1 Tab by mouth two (2) times a day. Max Daily Amount: 2 mg. Indications: ANXIETY     Dispense:  60 Tab     Refill:  0     Bipolar d/o- refilled anxiety x 1 m.  Next appt will be with psych in April  URI- Recommended over the counter symptomatic treatment of URI symptoms    Follow-up Disposition: Not on File

## 2017-03-08 NOTE — PROGRESS NOTES
1. Have you been to the ER, urgent care clinic since your last visit? Hospitalized since your last visit? No.    2. Have you seen or consulted any other health care providers outside of the [address] [place] [address] since your last visit? Include any pap smears or colon screening.  No.

## 2017-03-12 LAB
AMPHETAMINES UR QL SCN: NEGATIVE NG/ML
BARBITURATES UR QL SCN: NEGATIVE NG/ML
BENZODIAZ UR QL: POSITIVE NG/ML
BZE UR QL SCN: NEGATIVE NG/ML
CANNABINOIDS UR QL SCN: NEGATIVE NG/ML
CREAT UR-MCNC: 148 MG/DL (ref 20–300)
FENTANYL+NORFENTANYL UR QL SCN: NEGATIVE PG/ML
MEPERIDINE UR QL: NEGATIVE NG/ML
METHADONE UR QL SCN: NEGATIVE NG/ML
OPIATES UR QL SCN: NEGATIVE NG/ML
OXYCODONE+OXYMORPHONE UR QL SCN: NEGATIVE NG/ML
PCP UR QL: NEGATIVE NG/ML
PH UR: 5.6 [PH] (ref 4.5–8.9)
PLEASE NOTE:, 733157: ABNORMAL
PROPOXYPH UR QL SCN: NEGATIVE NG/ML
SP GR UR: 1.01
TRAMADOL UR QL SCN: NEGATIVE NG/ML

## 2017-03-24 PROBLEM — F11.90 NARCOTIC DRUG USE: Status: ACTIVE | Noted: 2017-03-24

## 2017-04-06 ENCOUNTER — HOSPITAL ENCOUNTER (EMERGENCY)
Age: 32
Discharge: HOME | End: 2017-04-06
Attending: EMERGENCY MEDICINE
Payer: MEDICAID

## 2017-04-06 VITALS
WEIGHT: 147 LBS | HEART RATE: 90 BPM | BODY MASS INDEX: 24.49 KG/M2 | HEIGHT: 65 IN | TEMPERATURE: 98.4 F | OXYGEN SATURATION: 100 % | SYSTOLIC BLOOD PRESSURE: 108 MMHG | RESPIRATION RATE: 16 BRPM | DIASTOLIC BLOOD PRESSURE: 70 MMHG

## 2017-04-06 DIAGNOSIS — J20.9 ACUTE BRONCHITIS, UNSPECIFIED ORGANISM: Primary | ICD-10-CM

## 2017-04-06 PROCEDURE — 99282 EMERGENCY DEPT VISIT SF MDM: CPT

## 2017-04-06 RX ORDER — PREDNISONE 1 MG/1
TABLET ORAL
COMMUNITY
End: 2017-04-06

## 2017-04-06 RX ORDER — PREDNISONE 10 MG/1
TABLET ORAL
Qty: 21 TAB | Refills: 0 | Status: SHIPPED | OUTPATIENT
Start: 2017-04-06 | End: 2017-07-25

## 2017-04-06 RX ORDER — LORATADINE 10 MG/1
10 TABLET ORAL DAILY
Qty: 20 TAB | Refills: 0 | Status: SHIPPED | OUTPATIENT
Start: 2017-04-06 | End: 2017-07-25

## 2017-04-06 RX ORDER — ALBUTEROL SULFATE 90 UG/1
1-2 AEROSOL, METERED RESPIRATORY (INHALATION)
Qty: 1 INHALER | Refills: 1 | Status: SHIPPED | OUTPATIENT
Start: 2017-04-06 | End: 2019-08-16 | Stop reason: SDUPTHER

## 2017-04-06 RX ORDER — LORATADINE 10 MG/1
10 TABLET ORAL DAILY
Qty: 20 TAB | Refills: 0 | Status: SHIPPED | OUTPATIENT
Start: 2017-04-06 | End: 2017-04-06

## 2017-04-06 NOTE — ED NOTES
..  Emergency Department Nursing Plan of Care       The Nursing Plan of Care is developed from the Nursing assessment and Emergency Department Attending provider initial evaluation. The plan of care may be reviewed in the ED Provider note. The Plan of Care was developed with the following considerations:   Patient / Family readiness to learn indicated by:verbalized understanding and appropriate questions asked  Persons(s) to be included in education: patient  Barriers to Learning/Limitations:No    Signed     Ashley Del Rosario RN    4/6/2017   12:01 PM    .. Patient verbalized name and date of birth. Name and date of birth compared to chart to validate information. Patient verbalized that his/her armband contains the correct spelling of name and date of birth.

## 2017-04-06 NOTE — DISCHARGE INSTRUCTIONS
Bronchitis: Care Instructions  Your Care Instructions    Bronchitis is inflammation of the bronchial tubes, which carry air to the lungs. The tubes swell and produce mucus, or phlegm. The mucus and inflamed bronchial tubes make you cough. You may have trouble breathing. Most cases of bronchitis are caused by viruses like those that cause colds. Antibiotics usually do not help and they may be harmful. Bronchitis usually develops rapidly and lasts about 2 to 3 weeks in otherwise healthy people. Follow-up care is a key part of your treatment and safety. Be sure to make and go to all appointments, and call your doctor if you are having problems. It's also a good idea to know your test results and keep a list of the medicines you take. How can you care for yourself at home? · Take all medicines exactly as prescribed. Call your doctor if you think you are having a problem with your medicine. · Get some extra rest.  · Take an over-the-counter pain medicine, such as acetaminophen (Tylenol), ibuprofen (Advil, Motrin), or naproxen (Aleve) to reduce fever and relieve body aches. Read and follow all instructions on the label. · Do not take two or more pain medicines at the same time unless the doctor told you to. Many pain medicines have acetaminophen, which is Tylenol. Too much acetaminophen (Tylenol) can be harmful. · Take an over-the-counter cough medicine that contains dextromethorphan to help quiet a dry, hacking cough so that you can sleep. Avoid cough medicines that have more than one active ingredient. Read and follow all instructions on the label. · Breathe moist air from a humidifier, hot shower, or sink filled with hot water. The heat and moisture will thin mucus so you can cough it out. · Do not smoke. Smoking can make bronchitis worse. If you need help quitting, talk to your doctor about stop-smoking programs and medicines. These can increase your chances of quitting for good.   When should you call for help? Call 911 anytime you think you may need emergency care. For example, call if:  · You have severe trouble breathing. Call your doctor now or seek immediate medical care if:  · You have new or worse trouble breathing. · You cough up dark brown or bloody mucus (sputum). · You have a new or higher fever. · You have a new rash. Watch closely for changes in your health, and be sure to contact your doctor if:  · You cough more deeply or more often, especially if you notice more mucus or a change in the color of your mucus. · You are not getting better as expected. Where can you learn more? Go to http://jelani-daniel.info/. Enter H333 in the search box to learn more about \"Bronchitis: Care Instructions. \"  Current as of: May 23, 2016  Content Version: 11.2  © 5452-0256 [employer]. Care instructions adapted under license by [employer] (which disclaims liability or warranty for this information). If you have questions about a medical condition or this instruction, always ask your healthcare professional. [address] any warranty or liability for your use of this information.

## 2017-04-06 NOTE — ED PROVIDER NOTES
Patient is a 32 y.o. female presenting with cough. The history is provided by the patient. Cough   This is a recurrent problem. Episode onset: One week ago. Episode frequency: cough, productive white/yellow sputum, cough worse at night. some wheeze. There has been no fever. Associated symptoms include chest pain (only with cough), sweats, headaches, rhinorrhea and wheezing. Pertinent negatives include no chills, no weight loss, no eye redness, no ear congestion, no ear pain, no sore throat, no myalgias, no shortness of breath, no nausea, no vomiting and no confusion. Treatments tried: Tried few left over steroid pills few days ago that seemed to help. Also, some improvement  with albuterol inhaler, but about out and needs refill. She is a smoker. Her past medical history is significant for bronchitis and asthma. Her past medical history does not include cancer, heart failure or CHF. Past Medical History:   Diagnosis Date    Asthma     Back pain     Bipolar 1 disorder (HCC)     Gastrointestinal disorder     IBS    Other ill-defined conditions     Frequent Boils    Scoliosis        Past Surgical History:   Procedure Laterality Date    HX TONSILLECTOMY      HX WISDOM TEETH EXTRACTION           Family History:   Problem Relation Age of Onset    Cancer Neg Hx     Hypertension Neg Hx     Diabetes Neg Hx     Stroke Neg Hx        Social History     Social History    Marital status: SINGLE     Spouse name: N/A    Number of children: N/A    Years of education: N/A     Occupational History    Not on file.      Social History Main Topics    Smoking status: Former Smoker     Packs/day: 0.50     Years: 2.00     Quit date: 8/5/2013    Smokeless tobacco: Never Used      Comment: denies    Alcohol use No    Drug use: No    Sexual activity: Yes     Partners: Male     Birth control/ protection: Injection     Other Topics Concern    Not on file     Social History Narrative         ALLERGIES: Naprosyn [naproxen]    Review of Systems   Constitutional: Negative for activity change, chills, diaphoresis, fatigue and weight loss. HENT: Positive for congestion and rhinorrhea. Negative for ear pain, nosebleeds, sneezing, sore throat, trouble swallowing and voice change. Eyes: Negative for discharge, redness, itching and visual disturbance. Respiratory: Positive for cough and wheezing. Negative for choking, chest tightness and shortness of breath. Cardiovascular: Positive for chest pain (only with cough). Negative for palpitations and leg swelling. Gastrointestinal: Negative for abdominal pain, diarrhea, nausea and vomiting. Genitourinary: Negative for dysuria, flank pain and urgency. Musculoskeletal: Negative for back pain, myalgias, neck pain and neck stiffness. Skin: Negative for pallor and rash. Neurological: Positive for headaches. Negative for seizures, syncope and weakness. Psychiatric/Behavioral: Negative for agitation, confusion, self-injury, sleep disturbance and suicidal ideas. All other systems reviewed and are negative. Vitals:    04/06/17 1136   BP: 108/70   Pulse: 90   Resp: 16   Temp: 98.4 °F (36.9 °C)   SpO2: 100%   Weight: 66.7 kg (147 lb)   Height: 5' 5\" (1.651 m)            Physical Exam   Nursing note and vitals reviewed. GENERAL ASSESSMENT: active, alert, no acute distress, well hydrated, well nourished  SKIN: no lesions, jaundice, petechiae, pallor, cyanosis, ecchymosis  HEAD: Atraumatic, normocephalic. EARS: bilateral TM's and external ear canals normal.  NOSE: nasal mucosa, septum, turbinates normal bilaterally. No tenderness over maxillary sinuses. MOUTH: mucous membranes moist.  pharynx without erythema or exudate. NECK: supple, full range of motion, no mass  RESP: respiratory effort normal. clear to auscultation with normal breath sounds bilaterally.    CARD: Regular rate and rhythm, normal S1/S2, no murmurs, normal pulses and capillary fill  ABDOMEN: normal bowel sounds, soft and non-tender throughout, nondistended, no mass, no organomegaly. NEURO: AAOX3. Non-focal.   EXTREMITY: Normal muscle tone. No deformity or tenderness. MDM  Number of Diagnoses or Management Options  Acute bronchitis, unspecified organism:   Diagnosis management comments: DDX: bronchitis, URI, allergies, PNA    ED Course       Procedures    LABORATORY TESTS:  No results found for this or any previous visit (from the past 12 hour(s)). IMAGING RESULTS:  No orders to display       MEDICATIONS GIVEN:  Medications - No data to display    IMPRESSION:  1. Acute bronchitis, unspecified organism      No fever. Lungs clear. Sats 100%  Will treat as simple bronchitis. PLAN:  1. Current Discharge Medication List      START taking these medications    Details   predniSONE (STERAPRED DS) 10 mg dose pack Take as directed  Qty: 21 Tab, Refills: 0      albuterol (PROVENTIL HFA, VENTOLIN HFA, PROAIR HFA) 90 mcg/actuation inhaler Take 1-2 Puffs by inhalation every four (4) hours as needed for Wheezing. Qty: 1 Inhaler, Refills: 1      guaiFENesin (MUCINEX) 1,200 mg Ta12 ER tablet Take 1 Tab by mouth two (2) times a day. Qty: 28 Tab, Refills: 0      loratadine (CLARITIN) 10 mg tablet Take 1 Tab by mouth daily. Qty: 20 Tab, Refills: 0         CONTINUE these medications which have NOT CHANGED    Details   albuterol (PROVENTIL VENTOLIN) 2.5 mg /3 mL (0.083 %) nebulizer solution 3 mL by Nebulization route every four (4) hours as needed for Wheezing or Shortness of Breath. Qty: 24 Each, Refills: 0         STOP taking these medications       predniSONE (DELTASONE) 1 mg tablet Comments:   Reason for Stopping:         DEXTROMETHORPHAN HBR (VICKS DAYQUIL COUGH PO) Comments:   Reason for Stoppin.   Follow-up Information     Follow up With Details Comments Contact Charla Yancey MD  Follow up with Dr. Loraine Libman in the next week for recheck. Return if worse.   [address] [facility]  [phone]      [facility] EMERGENCY DEPT  If symptoms worsen [address] [facility]        Return to ED if worse

## 2017-04-06 NOTE — ED NOTES
....Discharge summary and discharge medications reviewed with patient and appropriate educational materials and side effects teaching were provided. patient  Given 0 paper prescriptions and 4 electronic prescriptions sent to pt's listed pharmacy. Patient (s) verbalized understanding of the importance of discussing medications with his or her physician or clinic they will be following up with. No si/s of acute distress prior to discharge. Patient offered wheelchair from treatment area to hospital entrance, patient refused wheelchair. Pt reported 10/10 gen body aches (nonverbal pain of 0/10). No other pt complaints.

## 2017-07-11 RX ORDER — IBUPROFEN 800 MG/1
800 TABLET ORAL
Qty: 90 TAB | Refills: 0 | Status: SHIPPED | OUTPATIENT
Start: 2017-07-11 | End: 2017-09-20 | Stop reason: SDUPTHER

## 2017-07-11 RX ORDER — IBUPROFEN 800 MG/1
TABLET ORAL
COMMUNITY
Start: 2017-04-19 | End: 2017-07-11 | Stop reason: SDUPTHER

## 2017-07-25 ENCOUNTER — OFFICE VISIT (OUTPATIENT)
Dept: INTERNAL MEDICINE CLINIC | Age: 32
End: 2017-07-25

## 2017-07-25 ENCOUNTER — TELEPHONE (OUTPATIENT)
Dept: INTERNAL MEDICINE CLINIC | Age: 32
End: 2017-07-25

## 2017-07-25 VITALS
HEIGHT: 65 IN | TEMPERATURE: 98.6 F | DIASTOLIC BLOOD PRESSURE: 63 MMHG | BODY MASS INDEX: 24.07 KG/M2 | OXYGEN SATURATION: 99 % | SYSTOLIC BLOOD PRESSURE: 100 MMHG | HEART RATE: 63 BPM | RESPIRATION RATE: 18 BRPM | WEIGHT: 144.5 LBS

## 2017-07-25 DIAGNOSIS — N39.0 URINARY TRACT INFECTION WITH HEMATURIA, SITE UNSPECIFIED: Primary | ICD-10-CM

## 2017-07-25 DIAGNOSIS — R51.9 LEFT FACIAL PRESSURE AND PAIN: ICD-10-CM

## 2017-07-25 DIAGNOSIS — R31.9 URINARY TRACT INFECTION WITH HEMATURIA, SITE UNSPECIFIED: Primary | ICD-10-CM

## 2017-07-25 DIAGNOSIS — S02.40DS: ICD-10-CM

## 2017-07-25 LAB
BILIRUB UR QL STRIP: NEGATIVE
GLUCOSE UR-MCNC: NEGATIVE MG/DL
KETONES P FAST UR STRIP-MCNC: NEGATIVE MG/DL
PH UR STRIP: 7.5 [PH] (ref 4.6–8)
PROT UR QL STRIP: NORMAL MG/DL
SP GR UR STRIP: 1.02 (ref 1–1.03)
UA UROBILINOGEN AMB POC: NORMAL (ref 0.2–1)
URINALYSIS CLARITY POC: NORMAL
URINALYSIS COLOR POC: NORMAL
URINE BLOOD POC: NORMAL
URINE LEUKOCYTES POC: NORMAL
URINE NITRITES POC: NEGATIVE

## 2017-07-25 RX ORDER — NITROFURANTOIN 25; 75 MG/1; MG/1
100 CAPSULE ORAL 2 TIMES DAILY
Qty: 6 CAP | Refills: 0 | Status: SHIPPED | OUTPATIENT
Start: 2017-07-25 | End: 2017-07-28

## 2017-07-25 RX ORDER — PHENAZOPYRIDINE HYDROCHLORIDE 200 MG/1
200 TABLET, FILM COATED ORAL 3 TIMES DAILY
Qty: 6 TAB | Refills: 0 | Status: SHIPPED | OUTPATIENT
Start: 2017-07-25 | End: 2017-07-27

## 2017-07-25 RX ORDER — TRAMADOL HYDROCHLORIDE 50 MG/1
50 TABLET ORAL
Qty: 12 TAB | Refills: 0 | Status: SHIPPED | OUTPATIENT
Start: 2017-07-25 | End: 2017-07-30

## 2017-07-25 RX ORDER — QUETIAPINE FUMARATE 25 MG/1
TABLET, FILM COATED ORAL
COMMUNITY
Start: 2017-06-19 | End: 2018-08-28

## 2017-07-25 NOTE — MR AVS SNAPSHOT
Visit Information Date & Time Provider Department Dept. Phone Encounter #  
 7/25/2017  3:20 PM Roberth Chilel NP [address] [facility] [phone] [id number] Follow-up Instructions Return in about 2 months (around 9/25/2017) for PAP. Your Appointments 3/9/2018  9:45 AM  
ROUTINE CARE with Marry Cruz MD  
[facility]) Appt Note: CHECK UP  
 [place] [address] [facility] [address]  
[phone]  
  
   
 [place] [address] [place] [address] Upcoming Health Maintenance Date Due  
 PAP AKA CERVICAL CYTOLOGY 9/23/2017* INFLUENZA AGE 9 TO ADULT 8/1/2017 DTaP/Tdap/Td series (2 - Td) 5/21/2026 *Topic was postponed. The date shown is not the original due date. Allergies as of 7/25/2017  Review Complete On: 7/25/2017 By: Mitch Melendez. SNEHA Sahu Severity Noted Reaction Type Reactions Naprosyn [Naproxen] Medium 12/29/2013    Nausea and Vomiting Current Immunizations  Reviewed on 5/10/2013 Name Date  
 TD Vaccine 11/19/2012  3:24 AM  
 Tdap 5/21/2016  9:17 AM  
  
 Not reviewed this visit You Were Diagnosed With   
  
 Codes Comments Urinary tract infection with hematuria, site unspecified    -  Primary ICD-10-CM: N39.0, R31.9 ICD-9-CM: 599.0 Left facial pressure and pain     ICD-10-CM: R51 ICD-9-CM: 255. 0 Closed fracture of left side of maxilla, sequ[facility]     ICD-10-CM: S02.40DS ICD-9-CM: 802.4 Vitals BP Pulse Temp Resp Height(growth percentile) Weight(growth percentile) 100/63 (BP 1 Location: Left arm, BP Patient Position: Sitting) 63 98.6 °F (37 °C) (Oral) 18 5' 5\" (1.651 m) 144 lb 8 oz (65.5 kg) SpO2 BMI OB Status Smoking Status 99% 24.05 kg/m2 Injection Former Smoker Vitals History BMI and BSA Data Body Mass Index Body Surface Area 24.05 kg/m 2 1.73 m 2 Preferred Pharmacy Pharmacy Name Phone [email or web address] [address] [phone] Your Updated Medication List  
  
   
This list is accurate as of: 7/25/17  4:26 PM.  Always use your most recent med list.  
  
  
  
  
 * albuterol 2.5 mg /3 mL (0.083 %) nebulizer solution Commonly known as:  PROVENTIL VENTOLIN  
3 mL by Nebulization route every four (4) hours as needed for Wheezing or Shortness of Breath. * albuterol 90 mcg/actuation inhaler Commonly known as:  PROVENTIL HFA, VENTOLIN HFA, PROAIR HFA Take 1-2 Puffs by inhalation every four (4) hours as needed for Wheezing. ALPRAZolam 1 mg tablet Commonly known as:  Jenny Peppers Take 1 Tab by mouth two (2) times a day. Max Daily Amount: 2 mg. Indications: ANXIETY  
  
 ibuprofen 800 mg tablet Commonly known as:  MOTRIN Take 1 Tab by mouth every eight (8) hours as needed for Pain. nitrofurantoin (macrocrystal-monohydrate) 100 mg capsule Commonly known as:  MACROBID Take 1 Cap by mouth two (2) times a day for 3 days. OXcarbazepine 300 mg tablet Commonly known as:  TRILEPTAL Take 1 Tab by mouth three (3) times daily. Please take 1 tablet twice a day for 4 days and then take three times a day. phenazopyridine 200 mg tablet Commonly known as:  PYRIDIUM Take 1 Tab by mouth three (3) times daily for 2 days. * QUEtiapine 50 mg tablet Commonly known as:  SEROquel Take 1/2 tab in am and 1 tab in pm  
  
 * QUEtiapine 25 mg tablet Commonly known as:  SEROquel  
  
 traMADol 50 mg tablet Commonly known as:  ULTRAM  
Take 1 Tab by mouth every six (6) hours as needed for Pain. Max Daily Amount: 200 mg. Please do not operate machinery, drive a vehicle, or mix with alcohol; May cause DROWSINESS * Notice: This list has 4 medication(s) that are the same as other medications prescribed for you. Read the directions carefully, and ask your doctor or other care provider to review them with you. Prescriptions Printed Refills  
 traMADol (ULTRAM) 50 mg tablet 0 Sig: Take 1 Tab by mouth every six (6) hours as needed for Pain. Max Daily Amount: 200 mg. Please do not operate machinery, drive a vehicle, or mix with alcohol; May cause DROWSINESS Class: Print Route: Oral  
  
Prescriptions Sent to Pharmacy Refills  
 phenazopyridine (PYRIDIUM) 200 mg tablet 0 Sig: Take 1 Tab by mouth three (3) times daily for 2 days. Class: Normal  
 Pharmacy: [email or web address] [address] Ph #: [phone] Route: Oral  
 nitrofurantoin, macrocrystal-monohydrate, (MACROBID) 100 mg capsule 0 Sig: Take 1 Cap by mouth two (2) times a day for 3 days. Class: Normal  
 Pharmacy: [email or web address] [address] Ph #: [phone] Route: Oral  
  
We Performed the Following CULTURE, URINE [id number] CPT(R)] Follow-up Instructions Return in about 2 months (around 9/25/2017) for PAP. Patient Instructions Drink plenty of fluids, empty your bladder frequently,drink cranberry juice and wipe from front to back. Alternate heat and ice with topical ointments like Bengay, Tiger Balm, or Capsaicin several times a day. Try NSAIDs and tylenol for your pain initially and reserve narcotics for severe pain (8-10 pain). Urinary Tract Infection in Women: Care Instructions Your Care Instructions A urinary tract infection, or UTI, is a general term for an infection anywhere between the kidneys and the urethra (where urine comes out). Most UTIs are bladder infections. They often cause pain or burning when you urinate. UTIs are caused by bacteria and can be cured with antibiotics. Be sure to complete your treatment so that the infection goes away. Follow-up care is a key part of your treatment and safety.  Be sure to make and go to all appointments, and call your doctor if you are having problems. It's also a good idea to know your test results and keep a list of the medicines you take. How can you care for yourself at home? · Take your antibiotics as directed. Do not stop taking them just because you feel better. You need to take the full course of antibiotics. · Drink extra water and other fluids for the next day or two. This may help wash out the bacteria that are causing the infection. (If you have kidney, heart, or liver disease and have to limit fluids, talk with your doctor before you increase your fluid intake.) · Avoid drinks that are carbonated or have caffeine. They can irritate the bladder. · Urinate often. Try to empty your bladder each time. · To relieve pain, take a hot bath or lay a heating pad set on low over your lower belly or genital area. Never go to sleep with a heating pad in place. To prevent UTIs · Drink plenty of water each day. This helps you urinate often, which clears bacteria from your system. (If you have kidney, heart, or liver disease and have to limit fluids, talk with your doctor before you increase your fluid intake.) · Urinate when you need to. · Urinate right after you have sex. · Change sanitary pads often. · Avoid douches, bubble baths, feminine hygiene sprays, and other feminine hygiene products that have deodorants. · After going to the bathroom, wipe from front to back. When should you call for help? Call your doctor now or seek immediate medical care if: · Symptoms such as fever, chills, nausea, or vomiting get worse or appear for the first time. · You have new pain in your back just below your rib cage. This is called flank pain. · There is new blood or pus in your urine. · You have any problems with your antibiotic medicine. Watch closely for changes in your health, and be sure to contact your doctor if: 
· You are not getting better after taking an antibiotic for 2 days. · Your symptoms go away but then come back. Where can you learn more? Go to http://jelani-daniel.info/. Enter M354 in the search box to learn more about \"Urinary Tract Infection in Women: Care Instructions. \" Current as of: November 28, 2016 Content Version: 11.3 © 1121-8568 [employer]. Care instructions adapted under license by [employer] (which disclaims liability or warranty for this information). If you have questions about a medical condition or this instruction, always ask your healthcare professional. [address] any warranty or liability for your use of this information. Introducing \[facility]\"" & HEALTH SERVICES! Bekah Arriaga introduces Insta[employer] patient portal. Now you can access parts of your medical record, email your doctor's office, and request medication refills online. 1. In your internet browser, go to [email or web address] 2. Click on the First Time User? Click Here link in the Sign In box. You will see the New Member Sign Up page. 3. Enter your Instahealth Access Code exactly as it appears below. You will not need to use this code after youve completed the sign-up process. If you do not sign up before the expiration date, you must request a new code. · Instahealth Access Code: [email or web address] Expires: 10/23/2017  3:30 PM 
 
4. Enter the last four digits of your Social Security Number (xxxx) and Date of Birth (mm/dd/yyyy) as indicated and click Submit. You will be taken to the next sign-up page. 5. Create a Instahealth ID. This will be your Instahealth login ID and cannot be changed, so think of one that is secure and easy to remember. 6. Create a Instahealth password. You can change your password at any time. 7. Enter your Password Reset Question and Answer. This can be used at a later time if you forget your password. 8. Enter your e-mail address. You will receive e-mail notification when new information is available in [address] 9. Click Sign Up. You can now view and download portions of your medical record. 10. Click the Download Summary menu link to download a portable copy of your medical information. If you have questions, please visit the Frequently Asked Questions section of the [employer] website. Remember, [employer] is NOT to be used for urgent needs. For medical emergencies, dial 911. Now available from your iPhone and Android! Please provide this summary of care documentation to your next provider. Your primary care clinician is listed as GREGORY Chery. If you have any questions after today's visit, please call [phone].

## 2017-07-25 NOTE — PROGRESS NOTES
Pt is here for   Chief Complaint   Patient presents with    Urinary Pain     pt states x 1 week     Facial Pain     left side     Pt states pain level is a 8 face    1. Have you been to the ER, urgent care clinic since your last visit? Hospitalized since your last visit? No    2. Have you seen or consulted any other health care providers outside of the [address] [place] [address] since your last visit? Include any pap smears or colon screening.  No

## 2017-07-25 NOTE — PATIENT INSTRUCTIONS
Drink plenty of fluids, empty your bladder frequently,drink cranberry juice and wipe from front to back. Alternate heat and ice with topical ointments like Bengay, Tiger Balm, or Capsaicin several times a day. Try NSAIDs and tylenol for your pain initially and reserve narcotics for severe pain (8-10 pain). Urinary Tract Infection in Women: Care Instructions  Your Care Instructions    A urinary tract infection, or UTI, is a general term for an infection anywhere between the kidneys and the urethra (where urine comes out). Most UTIs are bladder infections. They often cause pain or burning when you urinate. UTIs are caused by bacteria and can be cured with antibiotics. Be sure to complete your treatment so that the infection goes away. Follow-up care is a key part of your treatment and safety. Be sure to make and go to all appointments, and call your doctor if you are having problems. It's also a good idea to know your test results and keep a list of the medicines you take. How can you care for yourself at home? · Take your antibiotics as directed. Do not stop taking them just because you feel better. You need to take the full course of antibiotics. · Drink extra water and other fluids for the next day or two. This may help wash out the bacteria that are causing the infection. (If you have kidney, heart, or liver disease and have to limit fluids, talk with your doctor before you increase your fluid intake.)  · Avoid drinks that are carbonated or have caffeine. They can irritate the bladder. · Urinate often. Try to empty your bladder each time. · To relieve pain, take a hot bath or lay a heating pad set on low over your lower belly or genital area. Never go to sleep with a heating pad in place. To prevent UTIs  · Drink plenty of water each day. This helps you urinate often, which clears bacteria from your system.  (If you have kidney, heart, or liver disease and have to limit fluids, talk with your doctor before you increase your fluid intake.)  · Urinate when you need to. · Urinate right after you have sex. · Change sanitary pads often. · Avoid douches, bubble baths, feminine hygiene sprays, and other feminine hygiene products that have deodorants. · After going to the bathroom, wipe from front to back. When should you call for help? Call your doctor now or seek immediate medical care if:  · Symptoms such as fever, chills, nausea, or vomiting get worse or appear for the first time. · You have new pain in your back just below your rib cage. This is called flank pain. · There is new blood or pus in your urine. · You have any problems with your antibiotic medicine. Watch closely for changes in your health, and be sure to contact your doctor if:  · You are not getting better after taking an antibiotic for 2 days. · Your symptoms go away but then come back. Where can you learn more? Go to http://jelani-daniel.info/. Enter L765 in the search box to learn more about \"Urinary Tract Infection in Women: Care Instructions. \"  Current as of: November 28, 2016  Content Version: 11.3  © 4618-0538 [employer]. Care instructions adapted under license by [employer] (which disclaims liability or warranty for this information). If you have questions about a medical condition or this instruction, always ask your healthcare professional. [address] any warranty or liability for your use of this information.
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
## 2017-07-25 NOTE — TELEPHONE ENCOUNTER
Pt called today statins she may have UTI. It hurts when she urinates but no burning  And sometimes it feels like she has to go and can't. No available appts soon. She wants to know what she should do.  Pt # [phone]    [address] has an opening today and pt was given appt for 3:20pm

## 2017-07-29 LAB — BACTERIA UR CULT: ABNORMAL

## 2017-07-30 ENCOUNTER — HOSPITAL ENCOUNTER (EMERGENCY)
Age: 32
Discharge: HOME | End: 2017-07-30
Attending: EMERGENCY MEDICINE
Payer: MEDICAID

## 2017-07-30 VITALS
HEIGHT: 65 IN | HEART RATE: 100 BPM | BODY MASS INDEX: 24.99 KG/M2 | OXYGEN SATURATION: 99 % | WEIGHT: 150 LBS | TEMPERATURE: 98.6 F | RESPIRATION RATE: 18 BRPM | SYSTOLIC BLOOD PRESSURE: 99 MMHG | DIASTOLIC BLOOD PRESSURE: 60 MMHG

## 2017-07-30 DIAGNOSIS — L92.3 TATTOO REACTION: Primary | ICD-10-CM

## 2017-07-30 PROCEDURE — 99282 EMERGENCY DEPT VISIT SF MDM: CPT

## 2017-07-30 RX ORDER — TRAMADOL HYDROCHLORIDE 50 MG/1
50 TABLET ORAL
Qty: 12 TAB | Refills: 0 | Status: SHIPPED | OUTPATIENT
Start: 2017-07-30 | End: 2017-09-20

## 2017-07-30 RX ORDER — CEPHALEXIN 500 MG/1
500 CAPSULE ORAL 4 TIMES DAILY
Qty: 28 CAP | Refills: 0 | Status: SHIPPED | OUTPATIENT
Start: 2017-07-30 | End: 2017-08-06

## 2017-07-30 RX ORDER — SULFAMETHOXAZOLE AND TRIMETHOPRIM 800; 160 MG/1; MG/1
2 TABLET ORAL 2 TIMES DAILY
Qty: 28 TAB | Refills: 0 | Status: SHIPPED | OUTPATIENT
Start: 2017-07-30 | End: 2017-08-06

## 2017-07-30 NOTE — ED PROVIDER NOTES
Patient is a 32 y.o. female presenting with skin problem. The history is provided by the patient. No  was used. Skin Problem    This is a new problem. The current episode started 6 to 12 hours ago. The problem has not changed since onset. Associated with: new tattoo. There has been no fever. The rash is present on the left lower leg. The pain is moderate. The pain has been constant since onset. Associated symptoms include pain. She has tried nothing for the symptoms. Pt got new tattoo on L foot/leg yesterday. This morning woke up with LE swelling, tenderness. States multiple previous tattoos \"same ink\" without problems. Past Medical History:   Diagnosis Date    Asthma     Back pain     Bipolar 1 disorder (HCC)     Gastrointestinal disorder     IBS    Other ill-defined conditions     Frequent Boils    Scoliosis        Past Surgical History:   Procedure Laterality Date    HX TONSILLECTOMY      HX WISDOM TEETH EXTRACTION           Family History:   Problem Relation Age of Onset    Cancer Neg Hx     Hypertension Neg Hx     Diabetes Neg Hx     Stroke Neg Hx        Social History     Social History    Marital status: SINGLE     Spouse name: N/A    Number of children: N/A    Years of education: N/A     Occupational History    Not on file. Social History Main Topics    Smoking status: Former Smoker     Packs/day: 0.50     Years: 2.00     Quit date: 8/5/2013    Smokeless tobacco: Never Used      Comment: denies    Alcohol use No    Drug use: No    Sexual activity: Yes     Partners: Male     Birth control/ protection: Injection     Other Topics Concern    Not on file     Social History Narrative         ALLERGIES: Naprosyn [naproxen]    Review of Systems   Musculoskeletal: Positive for joint swelling. Skin: Positive for color change. All other systems reviewed and are negative.       Vitals:    07/30/17 1335   BP: 99/60   Pulse: 100   Resp: 18   Temp: 98.6 °F (37 °C) SpO2: 99%   Weight: 68 kg (150 lb)   Height: 5' 5\" (1.651 m)            Physical Exam   Constitutional: She is oriented to person, place, and time. She appears well-developed and well-nourished. No distress. Black female with 6 children   HENT:   Head: Normocephalic and atraumatic. Eyes: Conjunctivae and EOM are normal. Pupils are equal, round, and reactive to light. Right eye exhibits no discharge. Left eye exhibits no discharge. No scleral icterus. Neck: Normal range of motion. Neck supple. Cardiovascular: Normal rate and intact distal pulses. Pulmonary/Chest: Effort normal. No respiratory distress. Musculoskeletal: Normal range of motion. She exhibits edema and tenderness. Neurological: She is alert and oriented to person, place, and time. Skin: Skin is warm and dry. She is not diaphoretic. There is erythema. Mild erythema, warm; tender edema L ankle and LE; no streaking, fluctuance   Psychiatric: She has a normal mood and affect. Her behavior is normal.   Nursing note and vitals reviewed.        MDM  ED Course       Procedures

## 2017-07-30 NOTE — ED NOTES
Patient (s)  given copy of dc instructions and 2 paper script(s) and 0 electronic scripts. Patient (s)  verbalized understanding of instructions and script (s). Patient given a current medication reconciliation form and verbalized understanding of their medications. Patient (s) verbalized understanding of the importance of discussing medications with  his or her physician or clinic they will be following up with. Patient alert and oriented and in no acute distress. Patient offered wheelchair from treatment area to hospital entrance, patient declines wheelchair.

## 2017-07-30 NOTE — Clinical Note
Wash, pat dry. Elevate leg. Take antibiotics.  Return or see your primary doctor for worsening redness, swelling, streaking, pus.

## 2017-07-30 NOTE — DISCHARGE INSTRUCTIONS
Infection From Tattoos: Care Instructions  Your Care Instructions  An infected tattoo can be serious. The area around your tattoo may be painful, swollen, red, and hot. You may see red streaks or pus at the tattoo site. You may have a fever. Or you may have swollen or tender lymph nodes. It's important to take good care of your infection at home so it doesn't get worse. Follow-up care is a key part of your treatment and safety. Be sure to make and go to all appointments, and call your doctor if you are having problems. It's also a good idea to know your test results and keep a list of the medicines you take. How can you care for yourself at home? · If your doctor prescribed antibiotics, take them as directed. Do not stop taking them just because you feel better. You need to take the full course of antibiotics. · If your doctor told you how to care for your infected tattoo, follow your doctor's instructions. If you did not get instructions, follow this general advice:  ¨ Wash the tattoo with clean water 2 times a day. Don't use hydrogen peroxide or alcohol, which can slow healing. ¨ You may cover the tattoo with a thin layer of petroleum jelly, such as Vaseline, and a nonstick bandage. ¨ Apply more petroleum jelly and replace the bandage as needed. · Ask your doctor if you can take an over-the-counter pain medicine, such as acetaminophen (Tylenol), ibuprofen (Advil, Motrin), or naproxen (Aleve). Be safe with medicines. Read and follow all instructions on the label. When should you call for help? Call your doctor now or seek immediate medical care if:  · You lose feeling in the area near the tattoo, or it feels numb or tingly. · The skin near the tattoo turns pale or cool. · The tattoo starts to bleed, and blood soaks through the bandage. Oozing small amounts of blood is normal.  Watch closely for changes in your health, and be sure to contact your doctor if:  · Your symptoms are getting worse.   Where can you learn more? Go to http://jelani-daniel.info/. Enter T882 in the search box to learn more about \"Infection From Tattoos: Care Instructions. \"  Current as of: March 20, 2017  Content Version: 11.3  © 7632-6211 [employer]. Care instructions adapted under license by [employer] (which disclaims liability or warranty for this information). If you have questions about a medical condition or this instruction, always ask your healthcare professional. [address] any warranty or liability for your use of this information.

## 2017-09-20 ENCOUNTER — OFFICE VISIT (OUTPATIENT)
Dept: INTERNAL MEDICINE CLINIC | Age: 32
End: 2017-09-20

## 2017-09-20 VITALS
OXYGEN SATURATION: 96 % | WEIGHT: 151.2 LBS | BODY MASS INDEX: 25.19 KG/M2 | TEMPERATURE: 98.7 F | HEIGHT: 65 IN | SYSTOLIC BLOOD PRESSURE: 100 MMHG | HEART RATE: 89 BPM | RESPIRATION RATE: 16 BRPM | DIASTOLIC BLOOD PRESSURE: 61 MMHG

## 2017-09-20 DIAGNOSIS — Z30.42 ENCOUNTER FOR DEPO-PROVERA CONTRACEPTION: Primary | ICD-10-CM

## 2017-09-20 PROBLEM — F11.90 NARCOTIC DRUG USE: Status: RESOLVED | Noted: 2017-03-24 | Resolved: 2017-09-20

## 2017-09-20 RX ORDER — IBUPROFEN 800 MG/1
800 TABLET ORAL
Qty: 90 TAB | Refills: 0 | Status: SHIPPED | OUTPATIENT
Start: 2017-09-20 | End: 2017-09-21 | Stop reason: SDUPTHER

## 2017-09-20 RX ORDER — MEDROXYPROGESTERONE ACETATE 150 MG/ML
150 INJECTION, SUSPENSION INTRAMUSCULAR ONCE
Qty: 1 ML | Refills: 0
Start: 2017-09-20 | End: 2017-09-20

## 2017-09-20 NOTE — PROGRESS NOTES
Annita Proctor is a 28 y.o. female and presents with Depo (pt states she would like to restart depo)    Subjective:  Pt here to resume Depo contraception. Unsure of last injection. LMP 1 WEEK AGO. Last intercourse over 2 months ago. No acute complaints otherwise.      Review of Systems  Constitutional: negative for fevers, chills, anorexia and weight loss  Respiratory:  negative for cough, hemoptysis, dyspnea, and wheezing  CV:   negative for chest pain, palpitations, and lower extremity edema  GI:   negative for nausea, vomiting, diarrhea, abdominal pain, and melena  Endo:               negative for polyuria,polydipsia,polyphagia, and heat intolerance  Genitourinary: negative for frequency, urgency, dysuria, retention, and hematuria  Behavl/Psych: negative for feelings of anxiety, depression, suicide, and mood changes    Past Medical History:   Diagnosis Date    Asthma     Back pain     Bipolar 1 disorder (HCC)     Gastrointestinal disorder     IBS    Other ill-defined conditions     Frequent Boils    Scoliosis      Past Surgical History:   Procedure Laterality Date    HX TONSILLECTOMY      HX WISDOM TEETH EXTRACTION       Social History     Social History    Marital status: SINGLE     Spouse name: N/A    Number of children: N/A    Years of education: N/A     Social History Main Topics    Smoking status: Former Smoker     Packs/day: 0.50     Years: 2.00     Quit date: 8/5/2013    Smokeless tobacco: Never Used      Comment: denies    Alcohol use No    Drug use: No    Sexual activity: Yes     Partners: Male     Birth control/ protection: Injection     Other Topics Concern    None     Social History Narrative     Family History   Problem Relation Age of Onset    Cancer Neg Hx     Hypertension Neg Hx     Diabetes Neg Hx     Stroke Neg Hx      Current Outpatient Prescriptions   Medication Sig Dispense Refill    QUEtiapine (SEROQUEL) 25 mg tablet       ibuprofen (MOTRIN) 800 mg tablet Take 1 Tab by mouth every eight (8) hours as needed for Pain. 90 Tab 0    albuterol (PROVENTIL HFA, VENTOLIN HFA, PROAIR HFA) 90 mcg/actuation inhaler Take 1-2 Puffs by inhalation every four (4) hours as needed for Wheezing. 1 Inhaler 1    ALPRAZolam (XANAX) 1 mg tablet Take 1 Tab by mouth two (2) times a day. Max Daily Amount: 2 mg. Indications: ANXIETY 60 Tab 0    OXcarbazepine (TRILEPTAL) 300 mg tablet Take 1 Tab by mouth three (3) times daily. Please take 1 tablet twice a day for 4 days and then take three times a day. 90 Tab 5    albuterol (PROVENTIL VENTOLIN) 2.5 mg /3 mL (0.083 %) nebulizer solution 3 mL by Nebulization route every four (4) hours as needed for Wheezing or Shortness of Breath. 24 Each 0    traMADol (ULTRAM) 50 mg tablet Take 1 Tab by mouth every six (6) hours as needed for Pain. Max Daily Amount: 200 mg. Please do not operate machinery, drive a vehicle, or mix with alcohol; May cause DROWSINESS 12 Tab 0    TRAMADOL HCL (TRAMADOL PO) Take  by mouth.  QUEtiapine (SEROQUEL) 50 mg tablet Take 1/2 tab in am and 1 tab in pm 45 Tab 5     Allergies   Allergen Reactions    Naprosyn [Naproxen] Nausea and Vomiting       Objective:  Visit Vitals    /61 (BP 1 Location: Left arm, BP Patient Position: Sitting)    Pulse 89    Temp 98.7 °F (37.1 °C) (Oral)    Resp 16    Ht 5' 5\" (1.651 m)    Wt 151 lb 3.2 oz (68.6 kg)    SpO2 96%    BMI 25.16 kg/m2     Wt Readings from Last 3 Encounters:   09/20/17 151 lb 3.2 oz (68.6 kg)   07/30/17 150 lb (68 kg)   07/25/17 144 lb 8 oz (65.5 kg)     Physical Exam:   General appearance - alert, well appearing, and in no distress. Mental status - A/O x 4,normal mood and affect. Chest - Symmetric chest rise. No wheezing. No distress. Heart - Normal rate. Abdomen- Soft, round. Non-distended, NT. No pulsatile masses or hernias. Ext- Radial, DP pulses, 2+ bilaterally. No pedal edema, clubbing, or cyanosis. Skin-Warm and dry.  No hyperpigmentation, ulcerations, or suspicious lesions. Neuro - Normal speech, no focal findings or movement disorder. Normal strength, gait, and muscle tone. Assessment/Plan:  Depo given today, HCG neg. Medication Side Effects and Warnings were discussed with patient: yes   Patient Labs were reviewed: yes  Patient Past Records were reviewed: yes    See below for other orders   Follow-up Disposition: Not on File    Pt has given consent verbally while in office for [employer] messaging. No diagnosis found. No orders of the defined types were placed in this encounter. Filipe Mckeon expressed understanding of plan. An After Visit Summary was offered/printed and given to the patient.

## 2017-09-20 NOTE — PATIENT INSTRUCTIONS
Barrier Methods of Birth Control: Care Instructions  Your Care Instructions  Barrier methods of birth control prevent pregnancy by blocking sperm. This stops the sperm from reaching an egg. Types of barrier methods include condoms, diaphragms, cervical caps, and the contraceptive sponge. Barrier methods work better when you use them with a spermicide. This is a substance that kills sperm. Spermicides come in many forms--cream, jelly, gel, foam, film, and suppository. Sometimes they are used alone as a birth control method. In general, barrier methods don't prevent pregnancy as well as IUDs or hormonal methods. The male condom and diaphragm are the barrier methods that work best. The cervical cap and sponge work about as well as a condom or a diaphragm for women who have not had a vaginal birth. But the cap and sponge don't work as well for women who have had a vaginal birth. The female condom does not work as well as a male condom. Use of spermicide alone does not work well to prevent pregnancy. Condoms also protect against sexually transmitted infections (STIs) such as HIV/AIDS and herpes. Other barrier methods do not protect against most STIs. Follow-up care is a key part of your treatment and safety. Be sure to make and go to all appointments, and call your doctor if you are having problems. It's also a good idea to know your test results and keep a list of the medicines you take. What kinds of barrier birth control are available? Barrier methods of birth control include:  · Male condom. This is a thin tube that fits over the penis. Condoms can be made of rubber (latex), plastic, or lambskin. They prevent sperm from getting into the vagina. Rubber and plastic condoms also protect against STIs. Lambskin condoms do not protect against STIs. The condom is placed over the erect penis right before sex. A new condom must be used each time the man has sex.  After 1 year, 13 out of 80 women whose partners use condoms will get pregnant. You can buy condoms without a doctor's prescription. · Female condom. This is a thin plastic pouch that is open on one end. The closed end is placed inside the vagina. The condom then lines the walls of the vagina and prevents sperm from getting into the vagina. The female condom also protects against STIs. A new condom must be used each time the woman has sex. After 1 year, 21 out of 80 women who use female condoms will get pregnant. You can buy female condoms without a doctor's prescription. · Diaphragm. This is a rubber dome with a firm, flexible rim. It fits inside a woman's vagina and covers the opening of the uterus (called the cervix). A diaphragm is always used with spermicide. A woman puts in the diaphragm no more than 6 hours before she has sex. After 1 year, 16 out of 80 women who use a diaphragm will get pregnant. You need a doctor's exam and a prescription to get a diaphragm. With good care, a diaphragm lasts 1 to 2 years. · Cervical cap. This is a rubber device. It fits inside the vagina, right up against the cervix. The cervical cap is always used with a spermicide. You need a doctor's prescription to get a cervical cap. After 1 year, 16 out of 100 women who use the cap and who have not had a vaginal delivery will get pregnant. Out of 100 women who have had a vaginal delivery, 28 will get pregnant after 1 year. A cervical cap can last for up to 2 years. · Contraceptive sponge. This is a thick plastic foam disc. It fits inside the vagina and covers the cervix. It also releases a spermicide. A woman wets the sponge and then inserts it into her vagina. She is then protected against pregnancy for the next 24 hours, even if she has sex more than once. After 1 year, 16 out of 100 women who use the sponge and who have not had a vaginal delivery will get pregnant. Out of 100 women who have had a vaginal delivery, 28 will get pregnant after 1 year.  You can buy the sponge without a doctor's prescription. · Spermicide. This is a substance that kills sperm. You can buy it as jelly, foam, cream, suppository, and film. The most common spermicide is called nonoxynol-9. Most spermicides come with an applicator, which is filled and put in the vagina about 15 minutes before sex. More spermicide must be used each time the woman has sex. Spermicide used alone does not work well to prevent pregnancy. After 1 year, 34 out of 100 women who use spermicide alone will get pregnant. You can buy spermicide without a doctor's prescription. What are the advantages of barrier methods of birth control? · Condoms protect against pregnancy. They also are the only method that may protect against STIs such as HIV/AIDS and herpes. · Barrier methods are safe to use while breastfeeding. · Barrier methods do not use hormones. So they are safe for women who smoke or who have health problems such as heart disease or blood clots. · These methods do not affect a woman's menstrual cycle. The ability to get pregnant returns as soon as a woman stops using birth control. · Barrier methods cost less than hormonal types of birth control. · You do not need a doctor's prescription for condoms, the contraceptive sponge, or spermicides. What are the disadvantages of barrier methods of birth control? · These methods do not prevent pregnancy as well as IUDs or hormonal forms of birth control. · Barrier methods prevent pregnancy only if you use them every time you have sex. · You may have to interrupt sex to use some barrier methods of birth control. · A woman needs a doctor's prescription to get a diaphragm or cervical cap. · The cervical cap and contraceptive sponge do not work as well as the other barrier methods for women who have delivered a child through the vagina. · The cervical cap and diaphragm can't be used by people who are allergic to latex or by women who have had toxic shock syndrome.   · The cervical cap should not be used during a menstrual period. Where can you learn more? Go to http://jelani-daniel.info/. Enter F415 in the search box to learn more about \"Barrier Methods of Birth Control: Care Instructions. \"  Current as of: March 16, 2017  Content Version: 11.3  © 3332-9759 [employer]. Care instructions adapted under license by [employer] (which disclaims liability or warranty for this information). If you have questions about a medical condition or this instruction, always ask your healthcare professional. [address] any warranty or liability for your use of this information. Shot for [employer] Control: Care Instructions  Your Care Instructions  The shot is used to prevent pregnancy. You get the shot in your upper arm or rear end (buttocks). The shot gives you a dose of the hormone progestin. The shot is often called by its brand name, Depo-Provera. The shot provides birth control for 3 months at a time. You then need another shot. Follow-up care is a key part of your treatment and safety. Be sure to make and go to all appointments, and call your doctor if you are having problems. It's also a good idea to know your test results and keep a list of the medicines you take. How can you care for yourself at home? How do you use the birth control shot? · If you get the shot during the first 5 days of your normal period, use backup birth control, such as a condom, or don't have intercourse for 24 hours. · If you get the shot more than 5 days after your periods starts, use backup birth control or don't have intercourse for 5 days. · Talk to your doctor about a schedule to get the shot. You need the shot every 3 months. If you are late getting it, you'll need backup birth control. What if you miss or are late for a shot? Always read the label for specific instructions, or call your doctor.  Here are some basic guidelines:  · Use backup birth control, such as a condom, or don't have intercourse. Continue using one of these methods until 5 days after you get the missed or late shot. · If you had intercourse, you can use emergency contraception, such as the morning-after pill (Plan B). You can use emergency contraception for up to 5 days after having had sex, but it works best if you take it right away. What else do you need to know? · The shot has side effects. Because the shot protects for 3 months, the side effects may last 3 months. ¨ You may have changes in your period and your period may stop. You may also have spotting or bleeding between periods. ¨ You may have mood changes, less interest in sex, or weight gain. · The shot may cause bone loss. Talk to your doctor about this and take steps to prevent bone loss, such as getting enough calcium in your diet and exercising regularly. · Check with your doctor before you use any other medicines, including over-the-counter medicines, vitamins, herbal products, and supplements. Birth control hormones may not work as well to prevent pregnancy when combined with other medicines. · The shot doesn't protect against sexually transmitted infections (STIs), such as herpes or HIV/AIDS. If you're not sure whether your sex partner might have an STI, use a condom to protect against infection. When should you call for help? Call your doctor now or seek immediate medical care if:  · You have severe belly pain. Watch closely for changes in your health, and be sure to contact your doctor if:  · You think you may be pregnant. · You have any problems with your birth control. · You feel you may be depressed. · You regularly have spotting. · You think you may have been exposed to or have a sexually transmitted infection. Where can you learn more? Go to http://jelani-daniel.info/. Enter U104 in the search box to learn more about \"Shot for Birth Control: Care Instructions. \"  Current as of: March 16, 2017  Content Version: 11.3  © 3358-9511 [employer]. Care instructions adapted under license by [employer] (which disclaims liability or warranty for this information). If you have questions about a medical condition or this instruction, always ask your healthcare professional. [address] any warranty or liability for your use of this information.

## 2017-09-20 NOTE — PROGRESS NOTES
Pt is here for   Chief Complaint   Patient presents with    Depo     pt states she would like to restart depo     Pt denies pain at this time     1. Have you been to the ER, urgent care clinic since your last visit? Hospitalized since your last visit? Yes When: last week to [facility] for yeast infection     2. Have you seen or consulted any other health care providers outside of the [address] [place] [address] since your last visit? Include any pap smears or colon screening.  No

## 2017-09-20 NOTE — MR AVS SNAPSHOT
Visit Information Date & Time Provider Department Dept. Phone Encounter #  
 9/20/2017 11:40 AM Chance Headley NP [address] [facility] [phone] [id number] Follow-up Instructions Return in about 3 months (around 12/20/2017) for annual with labs, depo shot. Your Appointments 3/9/2018  9:45 AM  
ROUTINE CARE with Estelle Ross MD  
[address]) Appt Note: CHECK UP  
 [place] [address] [facility] [address]  
[phone]  
  
   
 [place] [address] Upcoming Health Maintenance Date Due INFLUENZA AGE 9 TO ADULT 8/1/2017 PAP AKA CERVICAL CYTOLOGY 9/23/2017* DTaP/Tdap/Td series (2 - Td) 5/21/2026 *Topic was postponed. The date shown is not the original due date. Allergies as of 9/20/2017  Review Complete On: 9/20/2017 By: Estelita Parish. SNEHA Sahu Severity Noted Reaction Type Reactions Naprosyn [Naproxen] Medium 12/29/2013    Nausea and Vomiting Current Immunizations  Reviewed on 5/10/2013 Name Date  
 TD Vaccine 11/19/2012  3:24 AM  
 Tdap 5/21/2016  9:17 AM  
  
 Not reviewed this visit You Were Diagnosed With   
  
 Codes Comments Encounter for Depo-Provera contraception    -  Primary ICD-10-CM: Z30.42 
ICD-9-CM: V25.49 Vitals BP Pulse Temp Resp Height(growth percentile) Weight(growth percentile) 100/61 (BP 1 Location: Left arm, BP Patient Position: Sitting) 89 98.7 °F (37.1 °C) (Oral) 16 5' 5\" (1.651 m) 151 lb 3.2 oz (68.6 kg) SpO2 BMI OB Status Smoking Status 96% 25.16 kg/m2 Injection Former Smoker Vitals History BMI and BSA Data Body Mass Index Body Surface Area  
 25.16 kg/m 2 1.77 m 2 Preferred Pharmacy Pharmacy Name Phone [email or web address], [address] [phone] Your Updated Medication List  
  
   
 This list is accurate as of: 9/20/17 12:34 PM.  Always use your most recent med list.  
  
  
  
  
 * albuterol 2.5 mg /3 mL (0.083 %) nebulizer solution Commonly known as:  PROVENTIL VENTOLIN  
3 mL by Nebulization route every four (4) hours as needed for Wheezing or Shortness of Breath. * albuterol 90 mcg/actuation inhaler Commonly known as:  PROVENTIL HFA, VENTOLIN HFA, PROAIR HFA Take 1-2 Puffs by inhalation every four (4) hours as needed for Wheezing. ALPRAZolam 1 mg tablet Commonly known as:  Fairy Ny Take 1 Tab by mouth two (2) times a day. Max Daily Amount: 2 mg. Indications: ANXIETY  
  
 ibuprofen 800 mg tablet Commonly known as:  MOTRIN Take 1 Tab by mouth every eight (8) hours as needed for Pain.  
  
 medroxyPROGESTERone 150 mg/mL Syrg Commonly known as:  DEPO-PROVERA  
1 mL by IntraMUSCular route once for 1 dose. OXcarbazepine 300 mg tablet Commonly known as:  TRILEPTAL Take 1 Tab by mouth three (3) times daily. Please take 1 tablet twice a day for 4 days and then take three times a day. * QUEtiapine 50 mg tablet Commonly known as:  SEROquel Take 1/2 tab in am and 1 tab in pm  
  
 * QUEtiapine 25 mg tablet Commonly known as:  SEROquel * TRAMADOL PO Take  by mouth. * traMADol 50 mg tablet Commonly known as:  ULTRAM  
Take 1 Tab by mouth every six (6) hours as needed for Pain. Max Daily Amount: 200 mg. Please do not operate machinery, drive a vehicle, or mix with alcohol; May cause DROWSINESS * Notice: This list has 6 medication(s) that are the same as other medications prescribed for you. Read the directions carefully, and ask your doctor or other care provider to review them with you. Prescriptions Sent to Pharmacy Refills  
 ibuprofen (MOTRIN) 800 mg tablet 0 Sig: Take 1 Tab by mouth every eight (8) hours as needed for Pain.   
 Class: Normal  
 Pharmacy: [email or web address] [email or web address] #: [phone] Route: Oral  
  
We Performed the Following AMB POC URINE PREGNANCY TEST, VISUAL COLOR COMPARISON [90557 CPT(R)] [place] MEDROXYPROGESTERONE ACETATE, 1MG [ [facility]] [place] THER/PROPH/DIAG INJECTION, SUBCUT/IM [id number] CPT(R)] Follow-up Instructions Return in about 3 months (around 12/20/2017) for annual with labs, depo shot. Patient Instructions Barrier Methods of Birth Control: Care Instructions Your Care Instructions Barrier methods of birth control prevent pregnancy by blocking sperm. This stops the sperm from reaching an egg. Types of barrier methods include condoms, diaphragms, cervical caps, and the contraceptive sponge. Barrier methods work better when you use them with a spermicide. This is a substance that kills sperm. Spermicides come in many formscream, jelly, gel, foam, film, and suppository. Sometimes they are used alone as a birth control method. In general, barrier methods don't prevent pregnancy as well as IUDs or hormonal methods. The male condom and diaphragm are the barrier methods that work best. The cervical cap and sponge work about as well as a condom or a diaphragm for women who have not had a vaginal birth. But the cap and sponge don't work as well for women who have had a vaginal birth. The female condom does not work as well as a male condom. Use of spermicide alone does not work well to prevent pregnancy. Condoms also protect against sexually transmitted infections (STIs) such as HIV/AIDS and herpes. Other barrier methods do not protect against most STIs. Follow-up care is a key part of your treatment and safety. Be sure to make and go to all appointments, and call your doctor if you are having problems. It's also a good idea to know your test results and keep a list of the medicines you take. What kinds of barrier birth control are available? Barrier methods of birth control include: · Male condom. This is a thin tube that fits over the penis. Condoms can be made of rubber (latex), plastic, or lambskin. They prevent sperm from getting into the vagina. Rubber and plastic condoms also protect against STIs. Lambskin condoms do not protect against STIs. The condom is placed over the erect penis right before sex. A new condom must be used each time the man has sex. After 1 year, 13 out of 80 women whose partners use condoms will get pregnant. You can buy condoms without a doctor's prescription. · Female condom. This is a thin plastic pouch that is open on one end. The closed end is placed inside the vagina. The condom then lines the walls of the vagina and prevents sperm from getting into the vagina. The female condom also protects against STIs. A new condom must be used each time the woman has sex. After 1 year, 21 out of 80 women who use female condoms will get pregnant. You can buy female condoms without a doctor's prescription. · Diaphragm. This is a rubber dome with a firm, flexible rim. It fits inside a woman's vagina and covers the opening of the uterus (called the cervix). A diaphragm is always used with spermicide. A woman puts in the diaphragm no more than 6 hours before she has sex. After 1 year, 16 out of 80 women who use a diaphragm will get pregnant. You need a doctor's exam and a prescription to get a diaphragm. With good care, a diaphragm lasts 1 to 2 years. · Cervical cap. This is a rubber device. It fits inside the vagina, right up against the cervix. The cervical cap is always used with a spermicide. You need a doctor's prescription to get a cervical cap. After 1 year, 16 out of 100 women who use the cap and who have not had a vaginal delivery will get pregnant. Out of 100 women who have had a vaginal delivery, 28 will get pregnant after 1 year. A cervical cap can last for up to 2 years. · Contraceptive sponge. This is a thick plastic foam disc. It fits inside the vagina and covers the cervix. It also releases a spermicide. A woman wets the sponge and then inserts it into her vagina. She is then protected against pregnancy for the next 24 hours, even if she has sex more than once. After 1 year, 16 out of 100 women who use the sponge and who have not had a vaginal delivery will get pregnant. Out of 100 women who have had a vaginal delivery, 28 will get pregnant after 1 year. You can buy the sponge without a doctor's prescription. · Spermicide. This is a substance that kills sperm. You can buy it as jelly, foam, cream, suppository, and film. The most common spermicide is called nonoxynol-9. Most spermicides come with an applicator, which is filled and put in the vagina about 15 minutes before sex. More spermicide must be used each time the woman has sex. Spermicide used alone does not work well to prevent pregnancy. After 1 year, 34 out of 100 women who use spermicide alone will get pregnant. You can buy spermicide without a doctor's prescription. What are the advantages of barrier methods of birth control? · Condoms protect against pregnancy. They also are the only method that may protect against STIs such as HIV/AIDS and herpes. · Barrier methods are safe to use while breastfeeding. · Barrier methods do not use hormones. So they are safe for women who smoke or who have health problems such as heart disease or blood clots. · These methods do not affect a woman's menstrual cycle. The ability to get pregnant returns as soon as a woman stops using birth control. · Barrier methods cost less than hormonal types of birth control. · You do not need a doctor's prescription for condoms, the contraceptive sponge, or spermicides. What are the disadvantages of barrier methods of birth control? · These methods do not prevent pregnancy as well as IUDs or hormonal forms of birth control. · Barrier methods prevent pregnancy only if you use them every time you have sex. · You may have to interrupt sex to use some barrier methods of birth control. · A woman needs a doctor's prescription to get a diaphragm or cervical cap. · The cervical cap and contraceptive sponge do not work as well as the other barrier methods for women who have delivered a child through the vagina. · The cervical cap and diaphragm can't be used by people who are allergic to latex or by women who have had toxic shock syndrome. · The cervical cap should not be used during a menstrual period. Where can you learn more? Go to http://jelani-daniel.info/. Enter D067 in the search box to learn more about \"Barrier Methods of Birth Control: Care Instructions. \" Current as of: March 16, 2017 Content Version: 11.3 © 9848-3962 [employer]. Care instructions adapted under license by [employer] (which disclaims liability or warranty for this information). If you have questions about a medical condition or this instruction, always ask your healthcare professional. [place] [address] any warranty or liability for your use of this information. Shot for [employer] Control: Care Instructions Your Care Instructions The shot is used to prevent pregnancy. You get the shot in your upper arm or rear end (buttocks). The shot gives you a dose of the hormone progestin. The shot is often called by its brand name, Depo-Provera. The shot provides birth control for 3 months at a time. You then need another shot. Follow-up care is a key part of your treatment and safety. Be sure to make and go to all appointments, and call your doctor if you are having problems. It's also a good idea to know your test results and keep a list of the medicines you take. How can you care for yourself at home? How do you use the birth control shot? · If you get the shot during the first 5 days of your normal period, use backup birth control, such as a condom, or don't have intercourse for 24 hours. · If you get the shot more than 5 days after your periods starts, use backup birth control or don't have intercourse for 5 days. · Talk to your doctor about a schedule to get the shot. You need the shot every 3 months. If you are late getting it, you'll need backup birth control. What if you miss or are late for a shot? Always read the label for specific instructions, or call your doctor. Here are some basic guidelines: · Use backup birth control, such as a condom, or don't have intercourse. Continue using one of these methods until 5 days after you get the missed or late shot. · If you had intercourse, you can use emergency contraception, such as the morning-after pill (Plan B). You can use emergency contraception for up to 5 days after having had sex, but it works best if you take it right away. What else do you need to know? · The shot has side effects. Because the shot protects for 3 months, the side effects may last 3 months. ¨ You may have changes in your period and your period may stop. You may also have spotting or bleeding between periods. ¨ You may have mood changes, less interest in sex, or weight gain. · The shot may cause bone loss. Talk to your doctor about this and take steps to prevent bone loss, such as getting enough calcium in your diet and exercising regularly. · Check with your doctor before you use any other medicines, including over-the-counter medicines, vitamins, herbal products, and supplements. Birth control hormones may not work as well to prevent pregnancy when combined with other medicines. · The shot doesn't protect against sexually transmitted infections (STIs), such as herpes or HIV/AIDS. If you're not sure whether your sex partner might have an STI, use a condom to protect against infection. When should you call for help? Call your doctor now or seek immediate medical care if: 
· You have severe belly pain. Watch closely for changes in your health, and be sure to contact your doctor if: 
· You think you may be pregnant. · You have any problems with your birth control. · You feel you may be depressed. · You regularly have spotting. · You think you may have been exposed to or have a sexually transmitted infection. Where can you learn more? Go to http://jelani-daniel.info/. Enter C205 in the search box to learn more about \"Shot for Birth Control: Care Instructions. \" Current as of: March 16, 2017 Content Version: 11.3 © 7900-8740 [employer]. Care instructions adapted under license by [employer] (which disclaims liability or warranty for this information). If you have questions about a medical condition or this instruction, always ask your healthcare professional. [address] any warranty or liability for your use of this information. Introducing [facility]! Ariel Olguin introduces [employer] patient portal. Now you can access parts of your medical record, email your doctor's office, and request medication refills online. 1. In your internet browser, go to [email or web address] 2. Click on the First Time User? Click Here link in the Sign In box. You will see the New Member Sign Up page. 3. Enter your [employer] Access Code exactly as it appears below. You will not need to use this code after youve completed the sign-up process. If you do not sign up before the expiration date, you must request a new code. · [employer] Access Code: [email or web address] Expires: 10/23/2017  3:30 PM 
 
4. Enter the last four digits of your Social Security Number (xxxx) and Date of Birth (mm/dd/yyyy) as indicated and click Submit. You will be taken to the next sign-up page. 5. Create a [employer] ID. This will be your [employer] login ID and cannot be changed, so think of one that is secure and easy to remember. 6. Create a [employer] password. You can change your password at any time. 7. Enter your Password Reset Question and Answer. This can be used at a later time if you forget your password. 8. Enter your e-mail address. You will receive e-mail notification when new information is available in [address] 9. Click Sign Up. You can now view and download portions of your medical record. 10. Click the Download Summary menu link to download a portable copy of your medical information. If you have questions, please visit the Frequently Asked Questions section of the [employer] website. Remember, [employer] is NOT to be used for urgent needs. For medical emergencies, dial 911. Now available from your iPhone and Android! Please provide this summary of care documentation to your next provider. Your primary care clinician is listed as GREGORY Dolan. If you have any questions after today's visit, please call [phone].

## 2017-09-21 DIAGNOSIS — Z30.42 ENCOUNTER FOR DEPO-PROVERA CONTRACEPTION: ICD-10-CM

## 2017-09-21 RX ORDER — IBUPROFEN 800 MG/1
800 TABLET ORAL
OUTPATIENT
Start: 2017-09-21

## 2017-09-21 RX ORDER — IBUPROFEN 800 MG/1
800 TABLET ORAL
Qty: 90 TAB | Refills: 0 | Status: SHIPPED | OUTPATIENT
Start: 2017-09-21 | End: 2018-05-03 | Stop reason: SDUPTHER

## 2017-09-22 LAB
HCG URINE, QL. (POC): NEGATIVE
VALID INTERNAL CONTROL?: YES

## 2018-05-03 ENCOUNTER — OFFICE VISIT (OUTPATIENT)
Dept: INTERNAL MEDICINE CLINIC | Age: 33
End: 2018-05-03

## 2018-05-03 ENCOUNTER — HOSPITAL ENCOUNTER (OUTPATIENT)
Dept: LAB | Age: 33
Discharge: HOME | End: 2018-05-03
Payer: MEDICAID

## 2018-05-03 VITALS
SYSTOLIC BLOOD PRESSURE: 120 MMHG | WEIGHT: 148.2 LBS | HEIGHT: 65 IN | OXYGEN SATURATION: 98 % | HEART RATE: 83 BPM | RESPIRATION RATE: 18 BRPM | DIASTOLIC BLOOD PRESSURE: 80 MMHG | TEMPERATURE: 98 F | BODY MASS INDEX: 24.69 KG/M2

## 2018-05-03 DIAGNOSIS — Z30.42 ENCOUNTER FOR DEPO-PROVERA CONTRACEPTION: ICD-10-CM

## 2018-05-03 DIAGNOSIS — M79.672 LEFT FOOT PAIN: ICD-10-CM

## 2018-05-03 DIAGNOSIS — Z01.419 WELL WOMAN EXAM WITH ROUTINE GYNECOLOGICAL EXAM: Primary | ICD-10-CM

## 2018-05-03 DIAGNOSIS — Z13.228 SCREENING FOR ENDOCRINE, NUTRITIONAL, METABOLIC AND IMMUNITY DISORDER: ICD-10-CM

## 2018-05-03 DIAGNOSIS — L03.116 CELLULITIS OF LEFT FOOT: ICD-10-CM

## 2018-05-03 DIAGNOSIS — Z11.3 SCREEN FOR STD (SEXUALLY TRANSMITTED DISEASE): ICD-10-CM

## 2018-05-03 DIAGNOSIS — Z13.21 SCREENING FOR ENDOCRINE, NUTRITIONAL, METABOLIC AND IMMUNITY DISORDER: ICD-10-CM

## 2018-05-03 DIAGNOSIS — Z12.4 SCREENING FOR MALIGNANT NEOPLASM OF CERVIX: ICD-10-CM

## 2018-05-03 DIAGNOSIS — Z13.29 SCREENING FOR ENDOCRINE, NUTRITIONAL, METABOLIC AND IMMUNITY DISORDER: ICD-10-CM

## 2018-05-03 DIAGNOSIS — Z11.4 SCREENING FOR HIV WITHOUT PRESENCE OF RISK FACTORS: ICD-10-CM

## 2018-05-03 DIAGNOSIS — Z13.0 SCREENING FOR ENDOCRINE, NUTRITIONAL, METABOLIC AND IMMUNITY DISORDER: ICD-10-CM

## 2018-05-03 PROCEDURE — 88142 CYTOPATH C/V THIN LAYER: CPT | Performed by: NURSE PRACTITIONER

## 2018-05-03 RX ORDER — MEDROXYPROGESTERONE ACETATE 150 MG/ML
150 INJECTION, SUSPENSION INTRAMUSCULAR
Qty: 1 ML | Refills: 3 | Status: SHIPPED | OUTPATIENT
Start: 2018-05-03 | End: 2018-08-28

## 2018-05-03 RX ORDER — IBUPROFEN 800 MG/1
800 TABLET ORAL
Qty: 90 TAB | Refills: 0 | Status: SHIPPED | OUTPATIENT
Start: 2018-05-03 | End: 2018-06-24

## 2018-05-03 RX ORDER — ALPRAZOLAM 1 MG/1
TABLET ORAL
Refills: 3 | COMMUNITY
Start: 2018-04-20 | End: 2019-08-16 | Stop reason: SDUPTHER

## 2018-05-03 NOTE — PROGRESS NOTES
Subjective:   28 y.o. female for Well Woman Check. No LMP recorded. Patient has had an injection. Social History: has sex with males. Pertinent past medical hstory: no history of HTN, DVT, CAD, DM, liver disease, or migraines. Former smoker    Patient Active Problem List    Diagnosis Date Noted    Left foot pain 05/03/2018    Encounter for Depo-Provera contraception 09/20/2017    B[facility]      Current Outpatient Prescriptions   Medication Sig Dispense Refill    medroxyPROGESTERone (DEPO-PROVERA) 150 mg/mL injection 1 mL by IntraMUSCular route every three (3) months. 1 mL 3    ibuprofen (MOTRIN) 800 mg tablet Take 1 Tab by mouth every eight (8) hours as needed for Pain. 90 Tab 0    ALPRAZolam (XANAX) 1 mg tablet TAKE 1 TABLET BY MOUTH TWICE A DAY AS NEEDED  3    QUEtiapine (SEROQUEL) 25 mg tablet       albuterol (PROVENTIL HFA, VENTOLIN HFA, PROAIR HFA) 90 mcg/actuation inhaler Take 1-2 Puffs by inhalation every four (4) hours as needed for Wheezing. 1 Inhaler 1    OXcarbazepine (TRILEPTAL) 300 mg tablet Take 1 Tab by mouth three (3) times daily. Please take 1 tablet twice a day for 4 days and then take three times a day.  90 Tab 5     Allergies   Allergen Reactions    Naprosyn [Naproxen] Nausea and Vomiting     Past Medical History:   Diagnosis Date    Asthma     Back pain     Bipolar 1 disorder (HCC)     Gastrointestinal disorder     IBS    Other ill-defined conditions(369.89)     Frequent Boils    Scoliosis      Past Surgical History:   Procedure Laterality Date    HX TONSILLECTOMY      HX WISDOM TEETH EXTRACTION       Family History   Problem Relation Age of Onset    Cancer Neg Hx     Hypertension Neg Hx     Diabetes Neg Hx     Stroke Neg Hx      Social History   Substance Use Topics    Smoking status: Former Smoker     Packs/day: 0.50     Years: 2.00     Quit date: 8/5/2013    Smokeless tobacco: Never Used      Comment: denies    Alcohol use No        ROS:  Feeling well. No dyspnea or chest pain on exertion. No abdominal pain, change in bowel habits, black or bloody stools. No urinary tract symptoms. GYN ROS: normal menses, no abnormal bleeding, pelvic pain or discharge, no breast pain or new or enlarging lumps on self exam, she complains of left foot pain since having cellulitis following foot tattoo 1-2 months ago. Seen and treated at [facility], given IV antibiotics x 2 doses, but left AMA due to childcare needs. Feels she may have again due to foot pain and swelling, especially with shoes. No neurological complaints. Objective:     Visit Vitals    /80 (BP 1 Location: Left arm, BP Patient Position: Sitting)    Pulse 83    Temp 98 °F (36.7 °C) (Oral)    Resp 18    Ht 5' 5\" (1.651 m)    Wt 148 lb 3.2 oz (67.2 kg)    SpO2 98%    BMI 24.66 kg/m2     The patient appears well, alert, oriented x 3, in no distress. Left jaw swelling and enlarged forehead with far-set eyes, appearing like congenital anomaly. Neck supple. No adenopathy. + thyromegaly. DEE. Lungs are clear, good air entry, no wheezes, rhonchi or rales. S1 and S2 normal, no murmurs, regular rate and rhythm. Abdomen soft without tenderness, guarding, mass or organomegaly. Extremities show no edema, normal peripheral pulses. Left foot dorsum TTP. No warmth to touch, erythema or edema. Neurological is normal, no focal findings. BREAST EXAM: breasts appear normal, no suspicious masses, no skin or nipple changes or axillary nodes, nipple piercings in place without drainage (bars)    PELVIC EXAM: normal external genitalia, vulva, vagina, uterus and adnexa, PAP: Pap smear done today, thin-prep method. Cervix normal with moderate vaginal discharge. Thick white with mild odor. Hair distribution of mons pubis is sparse with normal growth to inner thighs and along labia.     Assessment/Plan:   well woman  no contraindication to continue hormonal therapy  pap smear  counseled on breast self exam  additional lab tests per orders  return annually or prn  Left foot pain, h/o cellulitis- REFERRED to podiatry. Xray to r/o osteomyelitis since IV antibiotic not finished, no oral antibiotics either. ICD-10-CM ICD-9-CM    1. Well woman exam with routine gynecological exam Z01.419 V72.31 PAP, LB, RFX HPV VZGUF([id number])      NUSWAB VAGINITIS PLUS    [V72.31]   2. Screening for malignant neoplasm of cervix Z12.4 V76.2 PAP, LB, RFX HPV FTPMJ([id number])   3. Encounter for Depo-Provera contraception Z30.42 V25.49 medroxyPROGESTERone (DEPO-PROVERA) 150 mg/mL injection      ibuprofen (MOTRIN) 800 mg tablet   4. Screening for endocrine, nutritional, metabolic and immunity disorder K01.08 H71.23 METABOLIC PANEL, COMPREHENSIVE    Z13.21  LIPID PANEL    Z13.228  CBC WITH AUTOMATED DIFF    Z13.0  TSH 3RD GENERATION   5. Screen for STD (sexually transmitted disease) Z11.3 V74.5 NUSWAB VAGINITIS PLUS   6. Left foot pain M79.672 729.5 XR FOOT LT MIN 3 V      REFERRAL TO PODIATRY   7. Cellulitis of left foot L03.116 682.7 XR FOOT LT MIN 3 V      REFERRAL TO PODIATRY   8. Screening for HIV without presence of risk factors Z11.4 V73.89 HIV 1/2 AG/AB, 4TH GENERATION,W RFLX CONFIRM   .

## 2018-05-03 NOTE — MR AVS SNAPSHOT
[address] [place] [address] [address] [id number] 
[phone] Patient: Marie Auguste MRN: [id number] RGE:[id number] Visit Information Date & Time Provider Department Dept. Phone Encounter #  
 5/3/2018  1:40 PM Ar Celestin NP [address] [facility] [phone] [id number] Follow-up Instructions Return in about 1 year (around 5/3/2019) for well woman with labs. Depo injections. Upcoming Health Maintenance Date Due Influenza Age 5 to Adult 8/1/2018 PAP AKA CERVICAL CYTOLOGY 5/3/2021 DTaP/Tdap/Td series (2 - Td) 5/21/2026 Allergies as of 5/3/2018  Review Complete On: 5/3/2018 By: Javi Mccracken. SNEHA Sahu Severity Noted Reaction Type Reactions Naprosyn [Naproxen] Medium 12/29/2013    Nausea and Vomiting Current Immunizations  Reviewed on 5/10/2013 Name Date  
 TD Vaccine 11/19/2012  3:24 AM  
 Tdap 5/21/2016  9:17 AM  
  
 Not reviewed this visit You Were Diagnosed With   
  
 Codes Comments Well woman exam with routine gynecological exam    -  Primary ICD-10-CM: Y84.559 ICD-9-CM: V72.31 [V72.31] Screening for malignant neoplasm of cervix     ICD-10-CM: Z12.4 ICD-9-CM: V76.2 Encounter for Depo-Provera contraception     ICD-10-CM: Z30.42 
ICD-9-CM: V25.49 Screening for endocrine, nutritional, metabolic and immunity disorder     ICD-10-CM: Z13.29, Z13.21, Z13.228, Z13.0 ICD-9-CM: V77.99 Screen for STD (sexually transmitted disease)     ICD-10-CM: Z11.3 ICD-9-CM: V74.5 Left foot pain     ICD-10-CM: O35.578 ICD-9-CM: 729.5 Cellulitis of left foot     ICD-10-CM: L03.116 ICD-9-CM: 682.7 Screening for HIV without presence of risk factors     ICD-10-CM: Z11.4 ICD-9-CM: V73.89 Vitals BP Pulse Temp Resp Height(growth percentile) Weight(growth percentile)  120/80 (BP 1 Location: Left arm, BP Patient Position: Sitting) 83 98 °F (36.7 °C) (Oral) 18 5' 5\" (1.651 m) 148 lb 3.2 oz (67.2 kg) SpO2 BMI OB Status Smoking Status 98% 24.66 kg/m2 Injection Former Smoker Vitals History BMI and BSA Data Body Mass Index Body Surface Area  
 24.66 kg/m 2 1.76 m 2 Preferred Pharmacy Pharmacy Name Phone [facility], [facility] [address] [phone] Your Updated Medication List  
  
   
This list is accurate as of 5/3/18  2:48 PM.  Always use your most recent med list.  
  
  
  
  
 albuterol 90 mcg/actuation inhaler Commonly known as:  PROVENTIL HFA, VENTOLIN HFA, PROAIR HFA Take 1-2 Puffs by inhalation every four (4) hours as needed for Wheezing. ALPRAZolam 1 mg tablet Commonly known as:  XANAX  
TAKE 1 TABLET BY MOUTH TWICE A DAY AS NEEDED  
  
 ibuprofen 800 mg tablet Commonly known as:  MOTRIN Take 1 Tab by mouth every eight (8) hours as needed for Pain.  
  
 medroxyPROGESTERone 150 mg/mL injection Commonly known as:  DEPO-PROVERA  
1 mL by IntraMUSCular route every three (3) months. OXcarbazepine 300 mg tablet Commonly known as:  TRILEPTAL Take 1 Tab by mouth three (3) times daily. Please take 1 tablet twice a day for 4 days and then take three times a day. QUEtiapine 25 mg tablet Commonly known as:  SEROquel Prescriptions Sent to Pharmacy Refills  
 medroxyPROGESTERone (DEPO-PROVERA) 150 mg/mL injection 3 Si mL by IntraMUSCular route every three (3) months. Class: Normal  
 Pharmacy: [facility]" [facility], [facility] [address] Ph #: [phone] Route: IntraMUSCular We Performed the Following CBC WITH AUTOMATED DIFF [23975 CPT(R)] HIV 1/2 AG/AB, 4TH GENERATION,W RFLX CONFIRM [id number] CPT(R)] LIPID PANEL [12180 CPT(R)] METABOLIC PANEL, COMPREHENSIVE [56648 CPT(R)] [address] [id number] Custom] PAP, LB, RFX HPV KHFEX(825260) [id number] CPT(R)] REFERRAL TO PODIATRY [REF90 Custom] Comments:  
 Diabetic foot care TSH 3RD GENERATION [18461 CPT(R)] Follow-up Instructions Return in about 1 year (around 5/3/2019) for well woman with labs. Depo injections. To-Do List   
 05/03/2018 Imaging:  XR FOOT LT MIN 3 V Referral Information Referral ID Referred By Referred To  
  
 [id number] Ji Jeyson JEAN [facility]   
   [address] [place], [address] Visits Status Start Date End Date 1 New Request 5/3/18 5/3/19 If your referral has a status of pending review or denied, additional information will be sent to support the outcome of this decision. Patient Instructions Cellulitis: Care Instructions Your Care Instructions Cellulitis is a skin infection. It often occurs after a break in the skin from a scrape, cut, bite, or puncture, or after a rash. The doctor has checked you carefully, but problems can develop later. If you notice any problems or new symptoms, get medical treatment right away. Follow-up care is a key part of your treatment and safety. Be sure to make and go to all appointments, and call your doctor if you are having problems. It's also a good idea to know your test results and keep a list of the medicines you take. How can you care for yourself at home? · Take your antibiotics as directed. Do not stop taking them just because you feel better. You need to take the full course of antibiotics. · Prop up the infected area on pillows to reduce pain and swelling. Try to keep the area above the level of your heart as often as you can. · If your doctor told you how to care for your wound, follow your doctor's instructions. If you did not get instructions, follow this general advice: ¨ Wash the wound with clean water 2 times a day. Don't use hydrogen peroxide or alcohol, which can slow healing. ¨ You may cover the wound with a thin layer of petroleum jelly, such as Vaseline, and a nonstick bandage. ¨ Apply more petroleum jelly and replace the bandage as needed. · Be safe with medicines. Take pain medicines exactly as directed. ¨ If the doctor gave you a prescription medicine for pain, take it as prescribed. ¨ If you are not taking a prescription pain medicine, ask your doctor if you can take an over-the-counter medicine. To prevent cellulitis in the future · Try to prevent cuts, scrapes, or other injuries to your skin. Cellulitis most often occurs where there is a break in the skin. · If you get a scrape, cut, mild burn, or bite, wash the wound with clean water as soon as you can to help avoid infection. Don't use hydrogen peroxide or alcohol, which can slow healing. · If you have swelling in your legs (edema), support stockings and good skin care may help prevent leg sores and cellulitis. · Take care of your feet, especially if you have diabetes or other conditions that increase the risk of infection. Wear shoes and socks. Do not go barefoot. If you have athlete's foot or other skin problems on your feet, talk to your doctor about how to treat them. When should you call for help? Call your doctor now or seek immediate medical care if: 
? · You have signs that your infection is getting worse, such as: 
¨ Increased pain, swelling, warmth, or redness. ¨ Red streaks leading from the area. ¨ Pus draining from the area. ¨ A fever. ? · You get a rash. ? Watch closely for changes in your health, and be sure to contact your doctor if: 
? · You are not getting better after 1 day (24 hours). ? · You do not get better as expected. Where can you learn more? Go to http://jelani-daniel.info/. [place] in the search box to learn more about \"Cellulitis: Care Instructions. \" Current as of: October 13, 2016 Content Version: 11.4 © 6546-2016 Appiny. Care instructions adapted under license by [employer] (which disclaims liability or warranty for this information). If you have questions about a medical condition or this instruction, always ask your healthcare professional. [address] any warranty or liability for your use of this information. Learning About Birth Control: The Shot What is the shot? The shot is used to prevent pregnancy. You get the shot in your upper arm or rear end (buttocks). The shot gives you a dose of the hormone progestin. The shot is often called by its brand name, Depo-Provera. Progestin prevents pregnancy in these ways: It thickens the mucus in the cervix. This makes it hard for sperm to travel into the uterus. It also thins the lining of the uterus, which makes it harder for a fertilized egg to attach to the uterus. Progestin can sometimes stop the ovaries from releasing an egg each month (ovulation). The shot provides birth control for 3 months at a time. You then need another shot. The shot can cause bone loss. Most women can use it safely for up to 2 years and then may choose to switch to another form of birth control. Some women may be able to use the shot for more than 2 years. How well does it work? In the first year of use: · When the shot is used exactly as directed, fewer than 1 woman out of 100 has an unplanned pregnancy. · When the shot is not used exactly as directed, 6 women out of 100 have an unplanned pregnancy. Be sure to tell your doctor about any health problems you have or medicines you take. He or she can help you choose the birth control method that is right for you. What are the advantages of the shot? · The shot is one of the most effective methods of birth control. · It's convenient. You need to get a shot only once every 3 months to prevent pregnancy. You don't have to interrupt sex to protect against pregnancy. · It prevents pregnancy for 3 months at a time. You don't have to worry about birth control for this time. · It's safe to use while breastfeeding. · The shot may reduce heavy bleeding and cramping. · The shot doesn't contain estrogen. So you can use it if you don't want to take estrogen or can't take estrogen because you have certain health problems or concerns. What are the disadvantages of the shot? · The shot doesn't protect against sexually transmitted infections (STIs), such as herpes or HIV/AIDS. If you aren't sure if your sex partner might have an STI, use a condom to protect against disease. · The shot may cause bone loss in some women. You shouldn't use this method for more than 2 years without talking to your doctor first about the risks and benefits. · Any side effects may last up to 3 months. ¨ The shot may cause irregular periods, or you may have spotting between periods. You may also stop getting a period. Some women see having no period as an advantage. ¨ It may cause mood changes, less interest in sex, or weight gain. · You must go to the doctor every 3 months to get the shot. · If you want to get pregnant, it may take 9 to 10 months after you stop getting the shot. This is because the hormones the shot provided have to leave your system, and your body has to readjust. 
· If you have severe side effects, you have to wait for the hormones to get out of your system. This may take up to 3 months. Where can you learn more? Go to http://jelani-daniel.info/. Enter A534 in the search box to learn more about \"Learning About Birth Control: The Shot. \" Current as of: March 16, 2017 Content Version: 11.4 © 9750-2577 Invenra. Care instructions adapted under license by [place] (which disclaims liability or warranty for this information).  If you have questions about a medical condition or this instruction, always ask your healthcare professional. [place] [address] any warranty or liability for your use of this information. Breast Self-Exam: Care Instructions Your Care Instructions A breast self-exam is when you check your breasts for lumps or changes. This regular exam helps you learn how your breasts normally look and feel. Most breast problems or changes are not because of cancer. Breast self-exam is not a substitute for a mammogram. Having regular breast exams by your doctor and regular mammograms improve your chances of finding any problems with your breasts. Some women set a time each month to do a step-by-step breast self-exam. Other women like a less formal system. They might look at their breasts as they brush their teeth, or feel their breasts once in a while in the shower. If you notice a change in your breast, tell your doctor. Follow-up care is a key part of your treatment and safety. Be sure to make and go to all appointments, and call your doctor if you are having problems. It's also a good idea to know your test results and keep a list of the medicines you take. How do you do a breast self-exam? 
· The best time to examine your breasts is usually one week after your menstrual period begins. Your breasts should not be tender then. If you do not have periods, you might do your exam on a day of the month that is easy to remember. · To examine your breasts: ¨ Remove all your clothes above the waist and lie down. When you are lying down, your breast tissue spreads evenly over your chest wall, which makes it easier to feel all your breast tissue. ¨ Use the pads-not the fingertips-of the 3 middle fingers of your left hand to check your right breast. Move your fingers slowly in small coin-sized circles that overlap. ¨ Use three levels of pressure to feel of all your breast tissue. Use light pressure to feel the tissue close to the skin surface.  Use medium pressure to feel a little deeper. Use firm pressure to feel your tissue close to your breastbone and ribs. Use each pressure level to feel your breast tissue before moving on to the next spot. ¨ Check your entire breast, moving up and down as if following a strip from the collarbone to the bra line, and from the armpit to the ribs. Repeat until you have covered the entire breast. 
¨ Repeat this procedure for your left breast, using the pads of the 3 middle fingers of your right hand. · To examine your breasts while in the shower: 
¨ Place one arm over your head and lightly soap your breast on that side. ¨ Using the pads of your fingers, gently move your hand over your breast (in the strip pattern described above), feeling carefully for any lumps or changes. ¨ Repeat for the other breast. 
· Have your doctor inspect anything you notice to see if you need further testing. Where can you learn more? Go to http://jelani-daniel.info/. Enter P148 in the search box to learn more about \"Breast Self-Exam: Care Instructions. \" Current as of: May 12, 2017 Content Version: 11.4 © 6456-3162 Memorado. Care instructions adapted under license by [employer] (which disclaims liability or warranty for this information). If you have questions about a medical condition or this instruction, always ask your healthcare professional. [place] [address] any warranty or liability for your use of this information. Well Visit, Ages 25 to 48: Care Instructions Your Care Instructions Physical exams can help you stay healthy. Your doctor has checked your overall health and may have suggested ways to take good care of yourself. He or she also may have recommended tests. At home, you can help prevent illness with healthy eating, regular exercise, and other steps. Follow-up care is a key part of your treatment and safety.  Be sure to make and go to all appointments, and call your doctor if you are having problems. It's also a good idea to know your test results and keep a list of the medicines you take. How can you care for yourself at home? · Reach and stay at a healthy weight. This will lower your risk for many problems, such as obesity, diabetes, heart disease, and high blood pressure. · Get at least 30 minutes of physical activity on most days of the week. Walking is a good choice. You also may want to do other activities, such as running, swimming, cycling, or playing tennis or team sports. Discuss any changes in your exercise program with your doctor. · Do not smoke or allow others to smoke around you. If you need help quitting, talk to your doctor about stop-smoking programs and medicines. These can increase your chances of quitting for good. · Talk to your doctor about whether you have any risk factors for sexually transmitted infections (STIs). Having one sex partner (who does not have STIs and does not have sex with anyone else) is a good way to avoid these infections. · Use birth control if you do not want to have children at this time. Talk with your doctor about the choices available and what might be best for you. · Protect your skin from too much sun. When you're outdoors from 10 a.m. to 4 p.m., stay in the shade or cover up with clothing and a hat with a wide brim. Wear sunglasses that block UV rays. Even when it's cloudy, put broad-spectrum sunscreen (SPF 30 or higher) on any exposed skin. · See a dentist one or two times a year for checkups and to have your teeth cleaned. · Wear a seat belt in the car. · Drink alcohol in moderation, if at all. That means no more than 2 drinks a day for men and 1 drink a day for women. Follow your doctor's advice about when to have certain tests. These tests can spot problems early. For everyone · Cholesterol.  Have the fat (cholesterol) in your blood tested after age 21. Your doctor will tell you how often to have this done based on your age, family history, or other things that can increase your risk for heart disease. · Blood pressure. Have your blood pressure checked during a routine doctor visit. Your doctor will tell you how often to check your blood pressure based on your age, your blood pressure results, and other factors. · Vision. Talk with your doctor about how often to have a glaucoma test. 
· Diabetes. Ask your doctor whether you should have tests for diabetes. · Colon cancer. Have a test for colon cancer at age 48. You may have one of several tests. If you are younger than 48, you may need a test earlier if you have any risk factors. Risk factors include whether you already had a precancerous polyp removed from your colon or whether your parent, brother, sister, or child has had colon cancer. For women · Breast exam and mammogram. Talk to your doctor about when you should have a clinical breast exam and a mammogram. Medical experts differ on whether and how often women under 50 should have these tests. Your doctor can help you decide what is right for you. · Pap test and pelvic exam. Begin Pap tests at age 24. A Pap test is the best way to find cervical cancer. The test often is part of a pelvic exam. Ask how often to have this test. 
· Tests for sexually transmitted infections (STIs). Ask whether you should have tests for STIs. You may be at risk if you have sex with more than one person, especially if your partners do not wear condoms. For men · Tests for sexually transmitted infections (STIs). Ask whether you should have tests for STIs. You may be at risk if you have sex with more than one person, especially if you do not wear a condom. · Testicular cancer exam. Ask your doctor whether you should check your testicles regularly.  
· Prostate exam. Talk to your doctor about whether you should have a blood test (called a PSA test) for prostate cancer. Experts differ on whether and when men should have this test. Some experts suggest it if you are older than 39 and are -American or have a father or brother who got prostate cancer when he was younger than 72. When should you call for help? Watch closely for changes in your health, and be sure to contact your doctor if you have any problems or symptoms that concern you. Where can you learn more? Go to http://jelani-daniel.info/. Enter P072 in the search box to learn more about \"Well Visit, Ages 25 to 48: Care Instructions. \" Current as of: May 12, 2017 Content Version: 11.4 © 2641-6128 Cnekt. Care instructions adapted under license by [employer] (which disclaims liability or warranty for this information). If you have questions about a medical condition or this instruction, always ask your healthcare professional. [address] any warranty or liability for your use of this information. Introducing [facility]! Sharita Piña introduces Breather patient portal. Now you can access parts of your medical record, email your doctor's office, and request medication refills online. 1. In your internet browser, go to [email or web address] 2. Click on the First Time User? Click Here link in the Sign In box. You will see the New Member Sign Up page. 3. Enter your Breather Access Code exactly as it appears below. You will not need to use this code after youve completed the sign-up process. If you do not sign up before the expiration date, you must request a new code. · Breather Access Code: [email or web address] Expires: 8/1/2018  2:39 PM 
 
4. Enter the last four digits of your Social Security Number (xxxx) and Date of Birth (mm/dd/yyyy) as indicated and click Submit. You will be taken to the next sign-up page. 5. Create a [employer] ID. This will be your [employer] login ID and cannot be changed, so think of one that is secure and easy to remember. 6. Create a [employer] password. You can change your password at any time. 7. Enter your Password Reset Question and Answer. This can be used at a later time if you forget your password. 8. Enter your e-mail address. You will receive e-mail notification when new information is available in [address] 9. Click Sign Up. You can now view and download portions of your medical record. 10. Click the Download Summary menu link to download a portable copy of your medical information. If you have questions, please visit the Frequently Asked Questions section of the [employer] website. Remember, [employer] is NOT to be used for urgent needs. For medical emergencies, dial 911. Now available from your iPhone and Android! Please provide this summary of care documentation to your next provider. Your primary care clinician is listed as GREGORY Talavera. If you have any questions after today's visit, please call [phone].

## 2018-05-03 NOTE — PATIENT INSTRUCTIONS
Cellulitis: Care Instructions  Your Care Instructions    Cellulitis is a skin infection. It often occurs after a break in the skin from a scrape, cut, bite, or puncture, or after a rash. The doctor has checked you carefully, but problems can develop later. If you notice any problems or new symptoms, get medical treatment right away. Follow-up care is a key part of your treatment and safety. Be sure to make and go to all appointments, and call your doctor if you are having problems. It's also a good idea to know your test results and keep a list of the medicines you take. How can you care for yourself at home? · Take your antibiotics as directed. Do not stop taking them just because you feel better. You need to take the full course of antibiotics. · Prop up the infected area on pillows to reduce pain and swelling. Try to keep the area above the level of your heart as often as you can. · If your doctor told you how to care for your wound, follow your doctor's instructions. If you did not get instructions, follow this general advice:  ¨ Wash the wound with clean water 2 times a day. Don't use hydrogen peroxide or alcohol, which can slow healing. ¨ You may cover the wound with a thin layer of petroleum jelly, such as Vaseline, and a nonstick bandage. ¨ Apply more petroleum jelly and replace the bandage as needed. · Be safe with medicines. Take pain medicines exactly as directed. ¨ If the doctor gave you a prescription medicine for pain, take it as prescribed. ¨ If you are not taking a prescription pain medicine, ask your doctor if you can take an over-the-counter medicine. To prevent cellulitis in the future  · Try to prevent cuts, scrapes, or other injuries to your skin. Cellulitis most often occurs where there is a break in the skin. · If you get a scrape, cut, mild burn, or bite, wash the wound with clean water as soon as you can to help avoid infection.  Don't use hydrogen peroxide or alcohol, which can slow healing. · If you have swelling in your legs (edema), support stockings and good skin care may help prevent leg sores and cellulitis. · Take care of your feet, especially if you have diabetes or other conditions that increase the risk of infection. Wear shoes and socks. Do not go barefoot. If you have athlete's foot or other skin problems on your feet, talk to your doctor about how to treat them. When should you call for help? Call your doctor now or seek immediate medical care if:  ? · You have signs that your infection is getting worse, such as:  ¨ Increased pain, swelling, warmth, or redness. ¨ Red streaks leading from the area. ¨ Pus draining from the area. ¨ A fever. ? · You get a rash. ? Watch closely for changes in your health, and be sure to contact your doctor if:  ? · You are not getting better after 1 day (24 hours). ? · You do not get better as expected. Where can you learn more? Go to http://jelani-daniel.info/. Miguel Waldrop in the search box to learn more about \"Cellulitis: Care Instructions. \"  Current as of: October 13, 2016  Content Version: 11.4  © 7050-5019 [employer]. Care instructions adapted under license by [employer] (which disclaims liability or warranty for this information). If you have questions about a medical condition or this instruction, always ask your healthcare professional. [place] [address] any warranty or liability for your use of this information. Learning About Birth Control: The Shot  What is the shot? The shot is used to prevent pregnancy. You get the shot in your upper arm or rear end (buttocks). The shot gives you a dose of the hormone progestin. The shot is often called by its brand name, Depo-Provera. Progestin prevents pregnancy in these ways: It thickens the mucus in the cervix. This makes it hard for sperm to travel into the uterus.  It also thins the lining of the uterus, which makes it harder for a fertilized egg to attach to the uterus. Progestin can sometimes stop the ovaries from releasing an egg each month (ovulation). The shot provides birth control for 3 months at a time. You then need another shot. The shot can cause bone loss. Most women can use it safely for up to 2 years and then may choose to switch to another form of birth control. Some women may be able to use the shot for more than 2 years. How well does it work? In the first year of use:  · When the shot is used exactly as directed, fewer than 1 woman out of 100 has an unplanned pregnancy. · When the shot is not used exactly as directed, 6 women out of 100 have an unplanned pregnancy. Be sure to tell your doctor about any health problems you have or medicines you take. He or she can help you choose the birth control method that is right for you. What are the advantages of the shot? · The shot is one of the most effective methods of birth control. · It's convenient. You need to get a shot only once every 3 months to prevent pregnancy. You don't have to interrupt sex to protect against pregnancy. · It prevents pregnancy for 3 months at a time. You don't have to worry about birth control for this time. · It's safe to use while breastfeeding. · The shot may reduce heavy bleeding and cramping. · The shot doesn't contain estrogen. So you can use it if you don't want to take estrogen or can't take estrogen because you have certain health problems or concerns. What are the disadvantages of the shot? · The shot doesn't protect against sexually transmitted infections (STIs), such as herpes or HIV/AIDS. If you aren't sure if your sex partner might have an STI, use a condom to protect against disease. · The shot may cause bone loss in some women. You shouldn't use this method for more than 2 years without talking to your doctor first about the risks and benefits. · Any side effects may last up to 3 months.   ¨ The shot may cause irregular periods, or you may have spotting between periods. You may also stop getting a period. Some women see having no period as an advantage. ¨ It may cause mood changes, less interest in sex, or weight gain. · You must go to the doctor every 3 months to get the shot. · If you want to get pregnant, it may take 9 to 10 months after you stop getting the shot. This is because the hormones the shot provided have to leave your system, and your body has to readjust.  · If you have severe side effects, you have to wait for the hormones to get out of your system. This may take up to 3 months. Where can you learn more? Go to http://jelaniComp[employer] Semicon[employer] Technologiesdaniel.info/. Enter H267 in the search box to learn more about \"Learning About Birth Control: The Shot. \"  Current as of: March 16, 2017  Content Version: 11.4  © 4077-6404 [employer]. Care instructions adapted under license by [employer] (which disclaims liability or warranty for this information). If you have questions about a medical condition or this instruction, always ask your healthcare professional. [place] [address] any warranty or liability for your use of this information. Breast Self-Exam: Care Instructions  Your Care Instructions    A breast self-exam is when you check your breasts for lumps or changes. This regular exam helps you learn how your breasts normally look and feel. Most breast problems or changes are not because of cancer. Breast self-exam is not a substitute for a mammogram. Having regular breast exams by your doctor and regular mammograms improve your chances of finding any problems with your breasts. Some women set a time each month to do a step-by-step breast self-exam. Other women like a less formal system. They might look at their breasts as they brush their teeth, or feel their breasts once in a while in the shower.   If you notice a change in your breast, tell your doctor. Follow-up care is a key part of your treatment and safety. Be sure to make and go to all appointments, and call your doctor if you are having problems. It's also a good idea to know your test results and keep a list of the medicines you take. How do you do a breast self-exam?  · The best time to examine your breasts is usually one week after your menstrual period begins. Your breasts should not be tender then. If you do not have periods, you might do your exam on a day of the month that is easy to remember. · To examine your breasts:  ¨ Remove all your clothes above the waist and lie down. When you are lying down, your breast tissue spreads evenly over your chest wall, which makes it easier to feel all your breast tissue. ¨ Use the pads-not the fingertips-of the 3 middle fingers of your left hand to check your right breast. Move your fingers slowly in small coin-sized circles that overlap. ¨ Use three levels of pressure to feel of all your breast tissue. Use light pressure to feel the tissue close to the skin surface. Use medium pressure to feel a little deeper. Use firm pressure to feel your tissue close to your breastbone and ribs. Use each pressure level to feel your breast tissue before moving on to the next spot. ¨ Check your entire breast, moving up and down as if following a strip from the collarbone to the bra line, and from the armpit to the ribs. Repeat until you have covered the entire breast.  ¨ Repeat this procedure for your left breast, using the pads of the 3 middle fingers of your right hand. · To examine your breasts while in the shower:  ¨ Place one arm over your head and lightly soap your breast on that side. ¨ Using the pads of your fingers, gently move your hand over your breast (in the strip pattern described above), feeling carefully for any lumps or changes.   ¨ Repeat for the other breast.  · Have your doctor inspect anything you notice to see if you need further testing. Where can you learn more? Go to http://jelani-daniel.info/. Enter P148 in the search box to learn more about \"Breast Self-Exam: Care Instructions. \"  Current as of: May 12, 2017  Content Version: 11.4  © 1807-5189 [employer]. Care instructions adapted under license by [employer] (which disclaims liability or warranty for this information). If you have questions about a medical condition or this instruction, always ask your healthcare professional. [place] [address] any warranty or liability for your use of this information. Well Visit, Ages 25 to 48: Care Instructions  Your Care Instructions    Physical exams can help you stay healthy. Your doctor has checked your overall health and may have suggested ways to take good care of yourself. He or she also may have recommended tests. At home, you can help prevent illness with healthy eating, regular exercise, and other steps. Follow-up care is a key part of your treatment and safety. Be sure to make and go to all appointments, and call your doctor if you are having problems. It's also a good idea to know your test results and keep a list of the medicines you take. How can you care for yourself at home? · Reach and stay at a healthy weight. This will lower your risk for many problems, such as obesity, diabetes, heart disease, and high blood pressure. · Get at least 30 minutes of physical activity on most days of the week. Walking is a good choice. You also may want to do other activities, such as running, swimming, cycling, or playing tennis or team sports. Discuss any changes in your exercise program with your doctor. · Do not smoke or allow others to smoke around you. If you need help quitting, talk to your doctor about stop-smoking programs and medicines. These can increase your chances of quitting for good.   · Talk to your doctor about whether you have any risk factors for sexually transmitted infections (STIs). Having one sex partner (who does not have STIs and does not have sex with anyone else) is a good way to avoid these infections. · Use birth control if you do not want to have children at this time. Talk with your doctor about the choices available and what might be best for you. · Protect your skin from too much sun. When you're outdoors from 10 a.m. to 4 p.m., stay in the shade or cover up with clothing and a hat with a wide brim. Wear sunglasses that block UV rays. Even when it's cloudy, put broad-spectrum sunscreen (SPF 30 or higher) on any exposed skin. · See a dentist one or two times a year for checkups and to have your teeth cleaned. · Wear a seat belt in the car. · Drink alcohol in moderation, if at all. That means no more than 2 drinks a day for men and 1 drink a day for women. Follow your doctor's advice about when to have certain tests. These tests can spot problems early. For everyone  · Cholesterol. Have the fat (cholesterol) in your blood tested after age 21. Your doctor will tell you how often to have this done based on your age, family history, or other things that can increase your risk for heart disease. · Blood pressure. Have your blood pressure checked during a routine doctor visit. Your doctor will tell you how often to check your blood pressure based on your age, your blood pressure results, and other factors. · Vision. Talk with your doctor about how often to have a glaucoma test.  · Diabetes. Ask your doctor whether you should have tests for diabetes. · Colon cancer. Have a test for colon cancer at age 48. You may have one of several tests. If you are younger than 48, you may need a test earlier if you have any risk factors. Risk factors include whether you already had a precancerous polyp removed from your colon or whether your parent, brother, sister, or child has had colon cancer.   For women  · Breast exam and mammogram. Talk to your doctor about when you should have a clinical breast exam and a mammogram. Medical experts differ on whether and how often women under 50 should have these tests. Your doctor can help you decide what is right for you. · Pap test and pelvic exam. Begin Pap tests at age 24. A Pap test is the best way to find cervical cancer. The test often is part of a pelvic exam. Ask how often to have this test.  · Tests for sexually transmitted infections (STIs). Ask whether you should have tests for STIs. You may be at risk if you have sex with more than one person, especially if your partners do not wear condoms. For men  · Tests for sexually transmitted infections (STIs). Ask whether you should have tests for STIs. You may be at risk if you have sex with more than one person, especially if you do not wear a condom. · Testicular cancer exam. Ask your doctor whether you should check your testicles regularly. · Prostate exam. Talk to your doctor about whether you should have a blood test (called a PSA test) for prostate cancer. Experts differ on whether and when men should have this test. Some experts suggest it if you are older than 39 and are -American or have a father or brother who got prostate cancer when he was younger than 72. When should you call for help? Watch closely for changes in your health, and be sure to contact your doctor if you have any problems or symptoms that concern you. Where can you learn more? Go to http://jelani-daniel.info/. Enter P072 in the search box to learn more about \"Well Visit, Ages 25 to 48: Care Instructions. \"  Current as of: May 12, 2017  Content Version: 11.4  © 2666-2093 [employer]. Care instructions adapted under license by [employer] (which disclaims liability or warranty for this information).  If you have questions about a medical condition or this instruction, always ask your healthcare professional. [place] any warranty or liability for your use of this information.

## 2018-05-03 NOTE — PROGRESS NOTES
Pt is here for   Chief Complaint   Patient presents with    Medication Refill     Depo Refill    Foot Pain     pt states she has cellulitis in her left foot.  Well Woman     with pap     Pt states pain level is a 7 left foot    1. Have you been to the ER, urgent care clinic since your last visit? Hospitalized since your last visit? Yes When: [facility] for foot pain and cellulitis. .. 3 months ago. ..left AMA    2. Have you seen or consulted any other health care providers outside of the [address] [place] [address] since your last visit? Include any pap smears or colon screening.  No

## 2018-05-04 LAB
ALBUMIN SERPL-MCNC: 4.1 G/DL (ref 3.5–5.5)
ALBUMIN/GLOB SERPL: 1.5 {RATIO} (ref 1.2–2.2)
ALP SERPL-CCNC: 74 IU/L (ref 39–117)
ALT SERPL-CCNC: 4 IU/L (ref 0–32)
AST SERPL-CCNC: 12 IU/L (ref 0–40)
BASOPHILS # BLD AUTO: 0 X10E3/UL (ref 0–0.2)
BASOPHILS NFR BLD AUTO: 1 %
BILIRUB SERPL-MCNC: <0.2 MG/DL (ref 0–1.2)
BUN SERPL-MCNC: 8 MG/DL (ref 6–20)
BUN/CREAT SERPL: 9 (ref 9–23)
CALCIUM SERPL-MCNC: 8.9 MG/DL (ref 8.7–10.2)
CHLORIDE SERPL-SCNC: 103 MMOL/L (ref 96–106)
CHOLEST SERPL-MCNC: 147 MG/DL (ref 100–199)
CO2 SERPL-SCNC: 23 MMOL/L (ref 18–29)
CREAT SERPL-MCNC: 0.94 MG/DL (ref 0.57–1)
EOSINOPHIL # BLD AUTO: 0.1 X10E3/UL (ref 0–0.4)
EOSINOPHIL NFR BLD AUTO: 3 %
ERYTHROCYTE [DISTWIDTH] IN BLOOD BY AUTOMATED COUNT: 13.5 % (ref 12.3–15.4)
GFR SERPLBLD CREATININE-BSD FMLA CKD-EPI: 81 ML/MIN/1.73
GFR SERPLBLD CREATININE-BSD FMLA CKD-EPI: 93 ML/MIN/1.73
GLOBULIN SER CALC-MCNC: 2.8 G/DL (ref 1.5–4.5)
GLUCOSE SERPL-MCNC: 77 MG/DL (ref 65–99)
HCT VFR BLD AUTO: 39.9 % (ref 34–46.6)
HDLC SERPL-MCNC: 48 MG/DL
HGB BLD-MCNC: 13 G/DL (ref 11.1–15.9)
HIV 1+2 AB+HIV1 P24 AG SERPL QL IA: NON REACTIVE
IMM GRANULOCYTES # BLD: 0 X10E3/UL (ref 0–0.1)
IMM GRANULOCYTES NFR BLD: 0 %
INTERPRETATION, 910389: NORMAL
LDLC SERPL CALC-MCNC: 83 MG/DL (ref 0–99)
LYMPHOCYTES # BLD AUTO: 1.9 X10E3/UL (ref 0.7–3.1)
LYMPHOCYTES NFR BLD AUTO: 48 %
MCH RBC QN AUTO: 29.3 PG (ref 26.6–33)
MCHC RBC AUTO-ENTMCNC: 32.6 G/DL (ref 31.5–35.7)
MCV RBC AUTO: 90 FL (ref 79–97)
MONOCYTES # BLD AUTO: 0.3 X10E3/UL (ref 0.1–0.9)
MONOCYTES NFR BLD AUTO: 7 %
NEUTROPHILS # BLD AUTO: 1.6 X10E3/UL (ref 1.4–7)
NEUTROPHILS NFR BLD AUTO: 41 %
PLATELET # BLD AUTO: 244 X10E3/UL (ref 150–379)
POTASSIUM SERPL-SCNC: 4.6 MMOL/L (ref 3.5–5.2)
PROT SERPL-MCNC: 6.9 G/DL (ref 6–8.5)
RBC # BLD AUTO: 4.44 X10E6/UL (ref 3.77–5.28)
SODIUM SERPL-SCNC: 141 MMOL/L (ref 134–144)
TRIGL SERPL-MCNC: 80 MG/DL (ref 0–149)
TSH SERPL DL<=0.005 MIU/L-ACNC: 0.71 UIU/ML (ref 0.45–4.5)
VLDLC SERPL CALC-MCNC: 16 MG/DL (ref 5–40)
WBC # BLD AUTO: 3.9 X10E3/UL (ref 3.4–10.8)

## 2018-05-09 LAB
A VAGINAE DNA VAG QL NAA+PROBE: ABNORMAL SCORE
BVAB2 DNA VAG QL NAA+PROBE: ABNORMAL SCORE
C ALBICANS DNA VAG QL NAA+PROBE: NEGATIVE
C GLABRATA DNA VAG QL NAA+PROBE: NEGATIVE
C TRACH RRNA SPEC QL NAA+PROBE: NEGATIVE
MEGA1 DNA VAG QL NAA+PROBE: ABNORMAL SCORE
N GONORRHOEA RRNA SPEC QL NAA+PROBE: NEGATIVE
T VAGINALIS RRNA SPEC QL NAA+PROBE: NEGATIVE

## 2018-05-10 DIAGNOSIS — B96.89 BV (BACTERIAL VAGINOSIS): Primary | ICD-10-CM

## 2018-05-10 DIAGNOSIS — N76.0 BV (BACTERIAL VAGINOSIS): Primary | ICD-10-CM

## 2018-05-10 RX ORDER — METRONIDAZOLE 500 MG/1
500 TABLET ORAL 2 TIMES DAILY
Qty: 14 TAB | Refills: 0 | Status: SHIPPED | OUTPATIENT
Start: 2018-05-10 | End: 2018-05-17

## 2018-06-11 ENCOUNTER — TELEPHONE (OUTPATIENT)
Dept: INTERNAL MEDICINE CLINIC | Age: 33
End: 2018-06-11

## 2018-06-11 DIAGNOSIS — B37.9 ANTIBIOTIC-INDUCED YEAST INFECTION: Primary | ICD-10-CM

## 2018-06-11 DIAGNOSIS — T36.95XA ANTIBIOTIC-INDUCED YEAST INFECTION: Primary | ICD-10-CM

## 2018-06-11 RX ORDER — FLUCONAZOLE 150 MG/1
150 TABLET ORAL ONCE
Qty: 1 TAB | Refills: 0 | Status: SHIPPED | OUTPATIENT
Start: 2018-06-11 | End: 2018-06-11

## 2018-06-11 NOTE — TELEPHONE ENCOUNTER
Pt states she has yeast infection from antibiotics .  Please send rx to [facility] pharmacy Pt # [phone]

## 2018-06-24 ENCOUNTER — HOSPITAL ENCOUNTER (EMERGENCY)
Age: 33
Discharge: HOME | End: 2018-06-24
Attending: EMERGENCY MEDICINE
Payer: MEDICAID

## 2018-06-24 VITALS
HEIGHT: 66 IN | BODY MASS INDEX: 23.95 KG/M2 | DIASTOLIC BLOOD PRESSURE: 62 MMHG | OXYGEN SATURATION: 98 % | WEIGHT: 149 LBS | HEART RATE: 78 BPM | TEMPERATURE: 98.2 F | RESPIRATION RATE: 17 BRPM | SYSTOLIC BLOOD PRESSURE: 95 MMHG

## 2018-06-24 DIAGNOSIS — L02.91 ABSCESS: Primary | ICD-10-CM

## 2018-06-24 PROCEDURE — 99282 EMERGENCY DEPT VISIT SF MDM: CPT

## 2018-06-24 PROCEDURE — 77030018836 HC SOL IRR NACL ICUM -A

## 2018-06-24 PROCEDURE — 75810000289 HC I&D ABSCESS SIMP/COMP/MULT

## 2018-06-24 PROCEDURE — 74011000250 HC RX REV CODE- 250: Performed by: PHYSICIAN ASSISTANT

## 2018-06-24 RX ORDER — LIDOCAINE HYDROCHLORIDE 10 MG/ML
10 INJECTION INFILTRATION; PERINEURAL ONCE
Status: COMPLETED | OUTPATIENT
Start: 2018-06-24 | End: 2018-06-24

## 2018-06-24 RX ORDER — HYDROCODONE BITARTRATE AND ACETAMINOPHEN 5; 325 MG/1; MG/1
1 TABLET ORAL
Qty: 3 TAB | Refills: 0 | Status: SHIPPED | OUTPATIENT
Start: 2018-06-24 | End: 2018-08-28

## 2018-06-24 RX ORDER — CLINDAMYCIN HYDROCHLORIDE 300 MG/1
300 CAPSULE ORAL 3 TIMES DAILY
Qty: 30 CAP | Refills: 0 | Status: SHIPPED | OUTPATIENT
Start: 2018-06-24 | End: 2018-07-04

## 2018-06-24 RX ADMIN — LIDOCAINE HYDROCHLORIDE 10 ML: 10 INJECTION, SOLUTION INFILTRATION; PERINEURAL at 11:35

## 2018-06-24 NOTE — DISCHARGE INSTRUCTIONS

## 2018-06-24 NOTE — ED PROVIDER NOTES
EMERGENCY DEPARTMENT HISTORY AND PHYSICAL EXAM    Date: 6/24/2018  Patient Name: Khang Butcher    History of Presenting Illness     Chief Complaint   Patient presents with    Skin Problem     right labial abscess x4 days         History Provided By: Patient      HPI: Khang Butcher is a 28 y.o. female with a PMH of asthma who presents with c/o of an abscess around her buttocks region that came up 4 days ago. Pt states she did hot water soaks to help bring it to and head and when it did pt squeezed and expressed pus from the area. Pt states she took a weeks dose of bactrim that ended 4 days ago. Pt denies any fevers, chills, weakness, abdominal pain, dysuria, pain with defection, n/v/d or any new symptoms. PCP: Roberth Chilel NP    Current Outpatient Prescriptions   Medication Sig Dispense Refill    clindamycin (CLEOCIN) 300 mg capsule Take 1 Cap by mouth three (3) times daily for 10 days. 30 Cap 0    HYDROcodone-acetaminophen (NORCO) 5-325 mg per tablet Take 1 Tab by mouth every eight (8) hours as needed for Pain. Max Daily Amount: 3 Tabs. 3 Tab 0    medroxyPROGESTERone (DEPO-PROVERA) 150 mg/mL injection 1 mL by IntraMUSCular route every three (3) months. 1 mL 3    albuterol (PROVENTIL HFA, VENTOLIN HFA, PROAIR HFA) 90 mcg/actuation inhaler Take 1-2 Puffs by inhalation every four (4) hours as needed for Wheezing. 1 Inhaler 1    OXcarbazepine (TRILEPTAL) 300 mg tablet Take 1 Tab by mouth three (3) times daily. Please take 1 tablet twice a day for 4 days and then take three times a day.  90 Tab 5    ALPRAZolam (XANAX) 1 mg tablet TAKE 1 TABLET BY MOUTH TWICE A DAY AS NEEDED  3    QUEtiapine (SEROQUEL) 25 mg tablet          Past History     Past Medical History:  Past Medical History:   Diagnosis Date    Asthma     Back pain     Bipolar 1 disorder (HCC)     Gastrointestinal disorder     IBS    Other ill-defined conditions(189.15)     Frequent Boils    Scoliosis        Past Surgical History:  Past Surgical History:   Procedure Laterality Date    HX TONSILLECTOMY      HX WISDOM TEETH EXTRACTION         Family History:  Family History   Problem Relation Age of Onset    Cancer Neg Hx     Hypertension Neg Hx     Diabetes Neg Hx     Stroke Neg Hx        Social History:  Social History   Substance Use Topics    Smoking status: Former Smoker     Packs/day: 0.50     Years: 2.00     Quit date: 8/5/2013    Smokeless tobacco: Never Used      Comment: denies    Alcohol use No       Allergies: Allergies   Allergen Reactions    Naprosyn [Naproxen] Nausea and Vomiting         Review of Systems   Review of Systems   Constitutional: Negative for activity change, appetite change, chills, diaphoresis, fatigue, fever and unexpected weight change. HENT: Negative for ear pain, hearing loss and sore throat. Eyes: Negative for pain and redness. Respiratory: Negative for cough, choking and shortness of breath. Cardiovascular: Negative for chest pain, palpitations and leg swelling. Gastrointestinal: Negative for abdominal pain, anal bleeding, blood in stool and constipation. Genitourinary: Negative for dysuria, frequency, hematuria and vaginal bleeding. Musculoskeletal: Negative for arthralgias, back pain and gait problem. Skin: Positive for wound. Negative for color change, pallor and rash. Psychiatric/Behavioral: Negative for agitation, behavioral problems, confusion and decreased concentration. All other systems reviewed and are negative. Physical Exam     Vitals:    06/24/18 1054   BP: 95/62   Pulse: 78   Resp: 17   Temp: 98.2 °F (36.8 °C)   SpO2: 98%   Weight: 67.6 kg (149 lb)   Height: 5' 6\" (1.676 m)     Physical Exam   Constitutional: She is oriented to person, place, and time. She appears well-developed and well-nourished. No distress. HENT:   Head: Normocephalic and atraumatic.    Right Ear: External ear normal.   Left Ear: External ear normal.   Eyes: Conjunctivae and EOM are normal. Pupils are equal, round, and reactive to light. Neck: Normal range of motion. Cardiovascular: Normal rate, regular rhythm and normal heart sounds. Pulmonary/Chest: Effort normal and breath sounds normal. No respiratory distress. She has no wheezes. She has no rales. She exhibits no tenderness. Abdominal: Soft. Bowel sounds are normal. She exhibits no distension. There is no tenderness. There is no rebound. Genitourinary:         Musculoskeletal: Normal range of motion. She exhibits no edema, tenderness or deformity. Neurological: She is alert and oriented to person, place, and time. She has normal reflexes. No cranial nerve deficit. Coordination normal.   Skin: She is not diaphoretic. There is erythema. Psychiatric: She has a normal mood and affect. Her behavior is normal. Judgment and thought content normal.   Nursing note and vitals reviewed. Diagnostic Study Results     Labs -   No results found for this or any previous visit (from the past 12 hour(s)). Radiologic Studies -   No orders to display     CT Results  (Last 48 hours)    None        CXR Results  (Last 48 hours)    None            Medical Decision Making   I am the first provider for this patient. I reviewed the vital signs, available nursing notes, past medical history, past surgical history, family history and social history. Vital Signs-Reviewed the patient's vital signs. Records Reviewed: Nursing Notes11:26    ED Course:     Disposition:  Discharged 11:58 AM      DISCHARGE NOTE:         Care plan outlined and precautions discussed. Patient has no new complaints, changes, or physical findings. Results of exam were reviewed with the patient. All medications were reviewed with the patient; will d/c home with rx. All of pt's questions and concerns were addressed. Patient was instructed and agrees to follow up with pcp, as well as to return to the ED upon further deterioration. Patient is ready to go home.     Follow-up Information     Follow up With Details Comments 500 Khadijah Abbasi NP In 2 days If symptoms worsen [facility] [place]  [facility] [place]      Jeri Gonzalez MD   [address] [facility] [address] [place]  [phone]            Current Discharge Medication List      START taking these medications    Details   clindamycin (CLEOCIN) 300 mg capsule Take 1 Cap by mouth three (3) times daily for 10 days. Qty: 30 Cap, Refills: 0      HYDROcodone-acetaminophen (NORCO) 5-325 mg per tablet Take 1 Tab by mouth every eight (8) hours as needed for Pain. Max Daily Amount: 3 Tabs. Qty: 3 Tab, Refills: 0    Associated Diagnoses: Abscess             Provider Notes (Medical Decision Making):     Ddx:abscess, cellulitis     Spoke with  regarding pt  Since pt has no constitutional symptoms, no fevers/chills etc  No rectal pain or pain with defecation and has no sever comorbidities ie diabetes  Will treat as a simple abscess with no ct  Will give abx   Pt does not currently take any of her psych medications explained she cannot take them concurrently with pain medication - pt verbalized understanding at discharged    Worsening si/sxs discussed extensively ie fevers or worsening pain  Follow up with PCP or RTC if symptoms/signs worsen  Side effects of medication discussed  Education materials provided at discharge   Pt verbalizes agreement with plan      Procedures:  I&D [facility]  Date/Time: 6/24/2018 11:42 AM  Performed by: Elly Berman  Authorized by: Elly Berman     Consent:     Consent obtained:  Verbal    Consent given by:  Patient    Risks discussed:  Bleeding, infection, incomplete drainage and pain  Location:     Type:  Abscess    Location:  Anogenital  Pre-procedure details:     Skin preparation:  Betadine, Chloraprep and antiseptic wash  Anesthesia (see MAR for exact dosages):      Anesthesia method:  None  Procedure type:     Complexity: Simple  Procedure details:     Incision types:  Stab incision    Incision depth:  Dermal    Scalpel blade:  11    Wound management:  Probed and deloculated and irrigated with saline    Wound treatment:  Wound left open  Post-procedure details:     Patient tolerance of procedure: Tolerated well, no immediate complications            Diagnosis     Clinical Impression:   1.  Abscess

## 2018-08-28 ENCOUNTER — OFFICE VISIT (OUTPATIENT)
Dept: INTERNAL MEDICINE CLINIC | Age: 33
End: 2018-08-28

## 2018-08-28 VITALS
RESPIRATION RATE: 16 BRPM | SYSTOLIC BLOOD PRESSURE: 99 MMHG | TEMPERATURE: 98.6 F | DIASTOLIC BLOOD PRESSURE: 77 MMHG | HEART RATE: 76 BPM | HEIGHT: 65 IN | OXYGEN SATURATION: 100 % | BODY MASS INDEX: 22.99 KG/M2 | WEIGHT: 138 LBS

## 2018-08-28 DIAGNOSIS — R31.29 OTHER MICROSCOPIC HEMATURIA: ICD-10-CM

## 2018-08-28 DIAGNOSIS — N92.6 IRREGULAR MENSES: Primary | ICD-10-CM

## 2018-08-28 DIAGNOSIS — T36.95XA ANTIBIOTIC-INDUCED YEAST INFECTION: ICD-10-CM

## 2018-08-28 DIAGNOSIS — B37.9 ANTIBIOTIC-INDUCED YEAST INFECTION: ICD-10-CM

## 2018-08-28 DIAGNOSIS — R10.30 LOWER ABDOMINAL PAIN: ICD-10-CM

## 2018-08-28 LAB
BILIRUB UR QL STRIP: NEGATIVE
GLUCOSE UR-MCNC: NEGATIVE MG/DL
HCG URINE, QL. (POC): NEGATIVE
KETONES P FAST UR STRIP-MCNC: NEGATIVE MG/DL
PH UR STRIP: 6.5 [PH] (ref 4.6–8)
PROT UR QL STRIP: NORMAL
SP GR UR STRIP: 1.02 (ref 1–1.03)
UA UROBILINOGEN AMB POC: NORMAL (ref 0.2–1)
URINALYSIS CLARITY POC: CLEAR
URINALYSIS COLOR POC: YELLOW
URINE BLOOD POC: NEGATIVE
URINE LEUKOCYTES POC: NEGATIVE
URINE NITRITES POC: NEGATIVE
VALID INTERNAL CONTROL?: YES

## 2018-08-28 RX ORDER — FLUCONAZOLE 150 MG/1
150 TABLET ORAL AS NEEDED
Qty: 2 TAB | Refills: 0 | Status: SHIPPED | OUTPATIENT
Start: 2018-08-28 | End: 2018-09-02 | Stop reason: ALTCHOICE

## 2018-08-28 NOTE — PATIENT INSTRUCTIONS

## 2018-08-28 NOTE — PROGRESS NOTES
Alyssa Gilman is a 35 y.o. female and presents with Irregular Menses; Vaginal Discharge (x 1 week); and Abdominal Pain    Subjective:  Patient presents with a complaint of vaginal discharge, started after taking PCN for tooth infection. Onset of symptoms was a few days ago and has been unchanged since that time. The patient describes the discharge as white and curd-like and does not experience abdominal discomfort with the discharge. She does not complain of burning with urination. She denies genital lesions at this time. The patient does  have a history of STD's or PID (Gonorrhea and Chlamydia) and is sexually active, with last sexual intercourse 2 days  Ago (unprotected). Having irregular menses for past 7 months, amenorrhea. Last Depo 6 months ago. Associated with lower abdominal pressure. Denies N/V/D. LBM today, normal.     Review of Systems  Constitutional: negative for fevers, chills, anorexia and weight loss  Eyes:   negative for visual disturbance, drainage, and irritation  ENT:   + left facial pain following facial fracture 10/2016. negative for tinnitus,sore throat,nasal congestion,ear pain,and hoarseness  Respiratory:  negative for cough, hemoptysis, dyspnea, and wheezing  CV:   negative for chest pain, palpitations, and lower extremity edema  GI:   negative for nausea, vomiting, diarrhea, abdominal pain, and melena  Endo:               negative for polyuria,polydipsia,polyphagia, and heat intolerance  Genitourinary: negative for frequency, urgency, dysuria, retention, and hematuria  Integument:  negative for rash, ulcerations, and pruritus  Hematologic:  negative for easy bruising and bleeding  Musculoskel: negative for arthralgias, muscle weakness,and joint pain/swelling  Neurological:  negative for headaches, dizziness, vertigo,and memory/gait problems  Behavl/Psych: + anxiety, bipolar; followed by DR. Serenity Box. negative for feelings of suicide and mood changes    Past Medical History:   Diagnosis Date    Asthma     Back pain     Bipolar 1 disorder (HCC)     Gastrointestinal disorder     IBS    Other ill-defined conditions(799.89)     Frequent Boils    Scoliosis      Past Surgical History:   Procedure Laterality Date    HX TONSILLECTOMY      HX WISDOM TEETH EXTRACTION       Social History     Social History    Marital status: SINGLE     Spouse name: N/A    Number of children: N/A    Years of education: N/A     Social History Main Topics    Smoking status: Former Smoker     Packs/day: 0.50     Years: 2.00     Quit date: 8/5/2013    Smokeless tobacco: Never Used      Comment: denies    Alcohol use No    Drug use: No    Sexual activity: Yes     Partners: Male     Birth control/ protection: Injection     Other Topics Concern    None     Social History Narrative     Family History   Problem Relation Age of Onset    Cancer Neg Hx     Hypertension Neg Hx     Diabetes Neg Hx     Stroke Neg Hx      Current Outpatient Prescriptions   Medication Sig Dispense Refill    fluconazole (DIFLUCAN) 150 mg tablet Take 1 Tab by mouth as needed (vaginal discharge and itching). 2 Tab 0    ALPRAZolam (XANAX) 1 mg tablet TAKE 1 TABLET BY MOUTH TWICE A DAY AS NEEDED  3    albuterol (PROVENTIL HFA, VENTOLIN HFA, PROAIR HFA) 90 mcg/actuation inhaler Take 1-2 Puffs by inhalation every four (4) hours as needed for Wheezing. 1 Inhaler 1    OXcarbazepine (TRILEPTAL) 300 mg tablet Take 1 Tab by mouth three (3) times daily. Please take 1 tablet twice a day for 4 days and then take three times a day.  90 Tab 5     Allergies   Allergen Reactions    Naprosyn [Naproxen] Nausea and Vomiting       Objective:  Visit Vitals    BP 99/77 (BP 1 Location: Left arm, BP Patient Position: Sitting)    Pulse 76    Temp 98.6 °F (37 °C) (Oral)    Resp 16    Ht 5' 5\" (1.651 m)    Wt 138 lb (62.6 kg)    LMP  (LMP Unknown)    SpO2 100%    BMI 22.96 kg/m2     Wt Readings from Last 3 Encounters:   08/28/18 138 lb (62.6 kg) 06/24/18 149 lb (67.6 kg)   05/03/18 148 lb 3.2 oz (67.2 kg)     Physical Exam:   General appearance - alert, well appearing, and in mild distress. Mental status - A/O x 4, normal mood and affect. Neck - No JVD. Chest - CTA. Symmetric chest rise. No wheezing, rales or rhonchi. Heart - Normal rate, regular rhythm. Normal S1, S2. No MGR or clicks. Abdomen - Soft,non-distended. Normoactive BS in all quadrants. NT, no mass or HSM. No CVAT. Ext- Radial, DP pulses, 2+ bilaterally. No pedal edema, clubbing, or cyanosis. Skin-Warm and dry. No hyperpigmentation, ulcerations, or suspicious lesions. Neuro - Normal speech, no focal findings or movement disorder. Normal strength, gait, and muscle tone. Assessment/Plan:  UA WNL and HCG neg. STD screening for lower abd pain. Medication Side Effects and Warnings were discussed with patient: yes   Patient Labs were reviewed: yes  Patient Past Records were reviewed: yes    See below for other orders   Follow-up Disposition:  Return if symptoms worsen or fail to improve. ICD-10-CM ICD-9-CM    1. Irregular menses N92.6 626.4 REFERRAL TO GYNECOLOGY      AMB POC URINE PREGNANCY TEST, VISUAL COLOR COMPARISON   2. Antibiotic-induced yeast infection B37.9 112.9 AMB POC URINALYSIS DIP STICK AUTO W/ MICRO    T36.95XA E930.9 CT/NG/T.VAGINALIS AMPLIFICATION      fluconazole (DIFLUCAN) 150 mg tablet   3. Lower abdominal pain R10.30 789.09 AMB POC URINALYSIS DIP STICK AUTO W/ MICRO      CT/NG/T.VAGINALIS AMPLIFICATION      CULTURE, URINE   4.  Other microscopic hematuria R31.29 599.72 CULTURE, URINE     Orders Placed This Encounter    CULTURE, URINE    CT/NG/T.VAGINALIS AMPLIFICATION     Order Specific Question:   Specimen type     Answer:   Urine [258]    REFERRAL TO GYNECOLOGY     Referral Priority:   Routine     Referral Type:   Consultation     Referral Reason:   Specialty Services Required     Referred to Provider:   Africa Medina NP    AMB POC URINALYSIS DIP STICK AUTO W/ MICRO    AMB POC URINE PREGNANCY TEST, VISUAL COLOR COMPARISON    fluconazole (DIFLUCAN) 150 mg tablet     Sig: Take 1 Tab by mouth as needed (vaginal discharge and itching). Dispense:  2 Tab     Refill:  0       Yue Galloway expressed understanding of plan. An After Visit Summary was offered/printed and given to the patient.

## 2018-08-28 NOTE — PROGRESS NOTES
Chief Complaint   Patient presents with    Irregular Menses    Vaginal Discharge     x 1 week    Abdominal Pain     1. Have you been to the ER, urgent care clinic since your last visit? Hospitalized since your last visit? No    2. Have you seen or consulted any other health care providers outside of the [address] [place] [address] since your last visit? Include any pap smears or colon screening. No    Fall Risk Assessment, last 12 mths 8/28/2018   Able to walk? Yes   Fall in past 12 months?  No   Fall with injury? -   Number of falls in past 12 months -   Fall Risk Score -

## 2018-08-28 NOTE — MR AVS SNAPSHOT
[address] [place] [address] 
 
 
 [address] [place] [address] [address] 
[phone] Patient: Pamela Cordon MRN: [id number] KUT:[birth year] Visit Information Date & Time Provider Department Dept. Phone Encounter #  
 8/28/2018 11:00 AM Almas Mack NP [address] [facility] [phone] [id number] Follow-up Instructions Return if symptoms worsen or fail to improve. Your Appointments 5/3/2019 11:00 AM  
Complete Physical with Almas Mack NP  
[address] [facility]) Appt Note: well woman with labs and depo injections; well woman with labs and depo injections [address] [employer] [employer] [address] [id number]  
[phone]  
  
   
 [facility] [facility] [place] Health Maintenance Date Due Influenza Age 5 to Adult 8/1/2018 PAP AKA CERVICAL CYTOLOGY 5/3/2021 DTaP/Tdap/Td series (2 - Td) 5/21/2026 Allergies as of 8/28/2018  Review Complete On: 8/28/2018 By: Katie Chappell, SNEHA Severity Noted Reaction Type Reactions Naprosyn [Naproxen] Medium 12/29/2013    Nausea and Vomiting Current Immunizations  Reviewed on 5/10/2013 Name Date  
 TD Vaccine 11/19/2012  3:24 AM  
 Tdap 5/21/2016  9:17 AM  
  
 Not reviewed this visit You Were Diagnosed With   
  
 Codes Comments Irregular menses    -  Primary ICD-10-CM: N92.6 ICD-9-CM: 626.4 Vaginal discharge     ICD-10-CM: N89.8 ICD-9-CM: 623.5 Lower abdominal pain     ICD-10-CM: R10.30 ICD-9-CM: 789.09 Vitals BP Pulse Temp Resp Height(growth percentile) Weight(growth percentile) 99/77 (BP 1 Location: Left arm, BP Patient Position: Sitting) 76 98.6 °F (37 °C) (Oral) 16 5' 5\" (1.651 m) 138 lb (62.6 kg) LMP SpO2 BMI OB Status Smoking Status (LMP Unknown) 100% 22.96 kg/m2 Injection Former Smoker BMI and BSA Data  Body Mass Index Body Surface Area  
 22.96 kg/m 2 1.69 m 2  
  
  
 Preferred Pharmacy Pharmacy Name Phone [email or web address] [place], [facility] [address] [phone] Your Updated Medication List  
  
   
This list is accurate as of 8/28/18 11:43 AM.  Always use your most recent med list.  
  
  
  
  
 albuterol 90 mcg/actuation inhaler Commonly known as:  PROVENTIL HFA, VENTOLIN HFA, PROAIR HFA Take 1-2 Puffs by inhalation every four (4) hours as needed for Wheezing. ALPRAZolam 1 mg tablet Commonly known as:  XANAX  
TAKE 1 TABLET BY MOUTH TWICE A DAY AS NEEDED  
  
 fluconazole 150 mg tablet Commonly known as:  DIFLUCAN Take 1 Tab by mouth as needed (vaginal discharge and itching). OXcarbazepine 300 mg tablet Commonly known as:  TRILEPTAL Take 1 Tab by mouth three (3) times daily. Please take 1 tablet twice a day for 4 days and then take three times a day. Prescriptions Sent to Pharmacy Refills  
 fluconazole (DIFLUCAN) 150 mg tablet 0 Sig: Take 1 Tab by mouth as needed (vaginal discharge and itching). Class: Normal  
 Pharmacy: [email or web address] - [place], [address] [place] [address] Rd Ph #: [phone] Route: Oral  
  
We Performed the Following AMB POC URINALYSIS DIP STICK AUTO W/ MICRO [33498 CPT(R)] CT/NG/T.VAGINALIS AMPLIFICATION E1688691 CPT(R)] REFERRAL TO GYNECOLOGY [REF30 Custom] Comments:  
 Please evaluate pt for irregular menses Follow-up Instructions Return if symptoms worsen or fail to improve. Referral Information Referral ID Referred By Referred To  
  
 [id number] Frank Nelson NP   
   [address] [address], [facility] [address]  Phone: [phone] Fax: [phone] Visits Status Start Date End Date 1 New Request 8/28/18 8/28/19 If your referral has a status of pending review or denied, additional information will be sent to support the outcome of this decision. Patient Instructions Vaginal Yeast Infection: Care Instructions Your Care Instructions A vaginal yeast infection is caused by too many yeast cells in the vagina. This is common in women of all ages. Itching, vaginal discharge and irritation, and other symptoms can bother you. But yeast infections don't often cause other health problems. Some medicines can increase your risk of getting a yeast infection. These include antibiotics, birth control pills, hormones, and steroids. You may also be more likely to get a yeast infection if you are pregnant, have diabetes, douche, or wear tight clothes. With treatment, most yeast infections get better in 2 to 3 days. Follow-up care is a key part of your treatment and safety. Be sure to make and go to all appointments, and call your doctor if you are having problems. It's also a good idea to know your test results and keep a list of the medicines you take. How can you care for yourself at home? · Take your medicines exactly as prescribed. Call your doctor if you think you are having a problem with your medicine. · Ask your doctor about over-the-counter (OTC) medicines for yeast infections. They may cost less than prescription medicines. If you use an OTC treatment, read and follow all instructions on the label. · Do not use tampons while using a vaginal cream or suppository. The tampons can absorb the medicine. Use pads instead. · Wear loose cotton clothing. Do not wear nylon or other fabric that holds body heat and moisture close to the skin. · Try sleeping without underwear. · Do not scratch. Relieve itching with a cold pack or a cool bath. · Do not wash your vaginal area more than once a day. Use plain water or a mild, unscented soap. Air-dry the vaginal area. · Change out of wet swimsuits after swimming. · Do not have sex until you have finished your treatment. · Do not douche. When should you call for help? Call your doctor now or seek immediate medical care if: 
  · You have unexpected vaginal bleeding.  
  · You have new or increased pain in your vagina or pelvis.  
 Watch closely for changes in your health, and be sure to contact your doctor if: 
  · You have a fever.  
  · You are not getting better after 2 days.  
  · Your symptoms come back after you finish your medicines. Where can you learn more? Go to http://jelani-daniel.info/. Enter O977 in the search box to learn more about \"Vaginal Yeast Infection: Care Instructions. \" Current as of: October 6, 2017 Content Version: 11.7 © 6631-9727 [employer]. Care instructions adapted under license by [employer] (which disclaims liability or warranty for this information). If you have questions about a medical condition or this instruction, always ask your healthcare professional. [address] any warranty or liability for your use of this information. Introducing [facility]! [facility] introduces JETME patient portal. Now you can access parts of your medical record, email your doctor's office, and request medication refills online. 1. In your internet browser, go to [email or web address] "[email or web address] 2. Click on the First Time User? Click Here link in the Sign In box. You will see the New Member Sign Up page. 3. Enter your JETME Access Code exactly as it appears below. You will not need to use this code after youve completed the sign-up process. If you do not sign up before the expiration date, you must request a new code. · JETME Access Code: [email or web address] Expires: 11/26/2018 11:43 AM 
 
4. Enter the last four digits of your Social Security Number (xxxx) and Date of Birth (mm/dd/yyyy) as indicated and click Submit. You will be taken to the next sign-up page. 5. Create a JETME ID.  This will be your JETME login ID and cannot be changed, so think of one that is secure and easy to remember. 6. Create a [employer] password. You can change your password at any time. 7. Enter your Password Reset Question and Answer. This can be used at a later time if you forget your password. 8. Enter your e-mail address. You will receive e-mail notification when new information is available in [address] 9. Click Sign Up. You can now view and download portions of your medical record. 10. Click the Download Summary menu link to download a portable copy of your medical information. If you have questions, please visit the Frequently Asked Questions section of the [employer] website. Remember, [employer] is NOT to be used for urgent needs. For medical emergencies, dial 911. Now available from your iPhone and Android! Please provide this summary of care documentation to your next provider. Your primary care clinician is listed as GREGORY Galeas. If you have any questions after today's visit, please call [phone].

## 2018-08-31 ENCOUNTER — TELEPHONE (OUTPATIENT)
Dept: INTERNAL MEDICINE CLINIC | Age: 33
End: 2018-08-31

## 2018-08-31 LAB
BACTERIA UR CULT: ABNORMAL
C TRACH RRNA SPEC QL NAA+PROBE: NEGATIVE
N GONORRHOEA RRNA SPEC QL NAA+PROBE: NEGATIVE
T VAGINALIS RRNA SPEC QL NAA+PROBE: NEGATIVE

## 2018-09-02 RX ORDER — CIPROFLOXACIN 500 MG/1
500 TABLET ORAL 2 TIMES DAILY
Qty: 14 TAB | Refills: 0 | Status: SHIPPED | OUTPATIENT
Start: 2018-09-02 | End: 2018-09-09

## 2019-04-30 ENCOUNTER — TELEPHONE (OUTPATIENT)
Dept: INTERNAL MEDICINE CLINIC | Age: 34
End: 2019-04-30

## 2019-04-30 NOTE — TELEPHONE ENCOUNTER
----- Message from Cedric Staples sent at 4/30/2019 11:38 AM EDT -----  Regarding: NP Lachelle   Pt is requesting to speak with nurse in regard to Depo Shot Rx. She is unsure if she should  Rx from pharmacy. Will you be administering the injection at office? BEST CONTACT: [phone]

## 2019-05-31 ENCOUNTER — HOSPITAL ENCOUNTER (EMERGENCY)
Age: 34
Discharge: HOME | End: 2019-05-31
Attending: EMERGENCY MEDICINE | Admitting: EMERGENCY MEDICINE
Payer: MEDICAID

## 2019-05-31 VITALS
OXYGEN SATURATION: 100 % | TEMPERATURE: 98.6 F | BODY MASS INDEX: 22.98 KG/M2 | HEART RATE: 84 BPM | DIASTOLIC BLOOD PRESSURE: 65 MMHG | SYSTOLIC BLOOD PRESSURE: 102 MMHG | RESPIRATION RATE: 15 BRPM | HEIGHT: 65 IN | WEIGHT: 137.9 LBS

## 2019-05-31 DIAGNOSIS — N76.0 BV (BACTERIAL VAGINOSIS): Primary | ICD-10-CM

## 2019-05-31 DIAGNOSIS — B96.89 BV (BACTERIAL VAGINOSIS): Primary | ICD-10-CM

## 2019-05-31 DIAGNOSIS — N39.0 URINARY TRACT INFECTION WITHOUT HEMATURIA, SITE UNSPECIFIED: ICD-10-CM

## 2019-05-31 LAB
ALBUMIN SERPL-MCNC: 3 G/DL (ref 3.5–5)
ALBUMIN/GLOB SERPL: 0.9 {RATIO} (ref 1.1–2.2)
ALP SERPL-CCNC: 58 U/L (ref 45–117)
ALT SERPL-CCNC: 7 U/L (ref 12–78)
ANION GAP SERPL CALC-SCNC: 7 MMOL/L (ref 5–15)
APPEARANCE UR: ABNORMAL
AST SERPL-CCNC: 10 U/L (ref 15–37)
BACTERIA URNS QL MICRO: ABNORMAL /HPF
BASOPHILS # BLD: 0 K/UL (ref 0–0.1)
BASOPHILS NFR BLD: 1 % (ref 0–1)
BILIRUB SERPL-MCNC: 0.3 MG/DL (ref 0.2–1)
BILIRUB UR QL: NEGATIVE
BUN SERPL-MCNC: 9 MG/DL (ref 6–20)
BUN/CREAT SERPL: 10 (ref 12–20)
CALCIUM SERPL-MCNC: 8.1 MG/DL (ref 8.5–10.1)
CHLORIDE SERPL-SCNC: 107 MMOL/L (ref 97–108)
CLUE CELLS VAG QL WET PREP: NORMAL
CO2 SERPL-SCNC: 27 MMOL/L (ref 21–32)
COLOR UR: ABNORMAL
CREAT SERPL-MCNC: 0.94 MG/DL (ref 0.55–1.02)
DIFFERENTIAL METHOD BLD: NORMAL
EOSINOPHIL # BLD: 0.1 K/UL (ref 0–0.4)
EOSINOPHIL NFR BLD: 2 % (ref 0–7)
EPITH CASTS URNS QL MICRO: ABNORMAL /LPF
ERYTHROCYTE [DISTWIDTH] IN BLOOD BY AUTOMATED COUNT: 12.2 % (ref 11.5–14.5)
GLOBULIN SER CALC-MCNC: 3.2 G/DL (ref 2–4)
GLUCOSE SERPL-MCNC: 86 MG/DL (ref 65–100)
GLUCOSE UR STRIP.AUTO-MCNC: NEGATIVE MG/DL
HCG UR QL: NEGATIVE
HCT VFR BLD AUTO: 37.8 % (ref 35–47)
HGB BLD-MCNC: 11.9 G/DL (ref 11.5–16)
HGB UR QL STRIP: NEGATIVE
IMM GRANULOCYTES # BLD AUTO: 0 K/UL (ref 0–0.04)
IMM GRANULOCYTES NFR BLD AUTO: 0 % (ref 0–0.5)
KETONES UR QL STRIP.AUTO: NEGATIVE MG/DL
KOH PREP SPEC: NORMAL
LEUKOCYTE ESTERASE UR QL STRIP.AUTO: NEGATIVE
LYMPHOCYTES # BLD: 1.8 K/UL (ref 0.8–3.5)
LYMPHOCYTES NFR BLD: 34 % (ref 12–49)
MCH RBC QN AUTO: 29.8 PG (ref 26–34)
MCHC RBC AUTO-ENTMCNC: 31.5 G/DL (ref 30–36.5)
MCV RBC AUTO: 94.7 FL (ref 80–99)
MONOCYTES # BLD: 0.4 K/UL (ref 0–1)
MONOCYTES NFR BLD: 8 % (ref 5–13)
NEUTS SEG # BLD: 3.1 K/UL (ref 1.8–8)
NEUTS SEG NFR BLD: 55 % (ref 32–75)
NITRITE UR QL STRIP.AUTO: POSITIVE
NRBC # BLD: 0 K/UL (ref 0–0.01)
NRBC BLD-RTO: 0 PER 100 WBC
PH UR STRIP: 7.5 [PH] (ref 5–8)
PLATELET # BLD AUTO: 176 K/UL (ref 150–400)
PMV BLD AUTO: 9.5 FL (ref 8.9–12.9)
POTASSIUM SERPL-SCNC: 4 MMOL/L (ref 3.5–5.1)
PROT SERPL-MCNC: 6.2 G/DL (ref 6.4–8.2)
PROT UR STRIP-MCNC: NEGATIVE MG/DL
RBC # BLD AUTO: 3.99 M/UL (ref 3.8–5.2)
RBC #/AREA URNS HPF: ABNORMAL /HPF (ref 0–5)
SERVICE CMNT-IMP: NORMAL
SODIUM SERPL-SCNC: 141 MMOL/L (ref 136–145)
SP GR UR REFRACTOMETRY: 1.02 (ref 1–1.03)
T VAGINALIS VAG QL WET PREP: NORMAL
UA: UC IF INDICATED,UAUC: ABNORMAL
UROBILINOGEN UR QL STRIP.AUTO: 1 EU/DL (ref 0.2–1)
WBC # BLD AUTO: 5.4 K/UL (ref 3.6–11)
WBC URNS QL MICRO: ABNORMAL /HPF (ref 0–4)

## 2019-05-31 PROCEDURE — 81001 URINALYSIS AUTO W/SCOPE: CPT

## 2019-05-31 PROCEDURE — 87086 URINE CULTURE/COLONY COUNT: CPT

## 2019-05-31 PROCEDURE — 74011250636 HC RX REV CODE- 250/636: Performed by: NURSE PRACTITIONER

## 2019-05-31 PROCEDURE — 87210 SMEAR WET MOUNT SALINE/INK: CPT

## 2019-05-31 PROCEDURE — 87186 SC STD MICRODIL/AGAR DIL: CPT

## 2019-05-31 PROCEDURE — 85025 COMPLETE CBC W/AUTO DIFF WBC: CPT

## 2019-05-31 PROCEDURE — 99284 EMERGENCY DEPT VISIT MOD MDM: CPT

## 2019-05-31 PROCEDURE — 36415 COLL VENOUS BLD VENIPUNCTURE: CPT

## 2019-05-31 PROCEDURE — 81025 URINE PREGNANCY TEST: CPT

## 2019-05-31 PROCEDURE — 80053 COMPREHEN METABOLIC PANEL: CPT

## 2019-05-31 PROCEDURE — 87491 CHLMYD TRACH DNA AMP PROBE: CPT

## 2019-05-31 PROCEDURE — 96372 THER/PROPH/DIAG INJ SC/IM: CPT

## 2019-05-31 PROCEDURE — 87077 CULTURE AEROBIC IDENTIFY: CPT

## 2019-05-31 PROCEDURE — 74011250637 HC RX REV CODE- 250/637: Performed by: NURSE PRACTITIONER

## 2019-05-31 RX ORDER — AZITHROMYCIN 500 MG/1
1000 TABLET, FILM COATED ORAL
Status: COMPLETED | OUTPATIENT
Start: 2019-05-31 | End: 2019-05-31

## 2019-05-31 RX ORDER — CEPHALEXIN 500 MG/1
500 CAPSULE ORAL 3 TIMES DAILY
Qty: 21 CAP | Refills: 0 | Status: SHIPPED | OUTPATIENT
Start: 2019-05-31 | End: 2019-06-07

## 2019-05-31 RX ORDER — METRONIDAZOLE 500 MG/1
500 TABLET ORAL 2 TIMES DAILY
Qty: 14 TAB | Refills: 0 | Status: SHIPPED | OUTPATIENT
Start: 2019-05-31 | End: 2019-06-07

## 2019-05-31 RX ADMIN — AZITHROMYCIN 1000 MG: 500 TABLET, FILM COATED ORAL at 12:59

## 2019-05-31 RX ADMIN — LIDOCAINE HYDROCHLORIDE 250 MG: 10 INJECTION, SOLUTION EPIDURAL; INFILTRATION; INTRACAUDAL; PERINEURAL at 13:01

## 2019-05-31 NOTE — DISCHARGE INSTRUCTIONS
Patient Education        Bacterial Vaginosis: Care Instructions  Your Care Instructions    Bacterial vaginosis is a type of vaginal infection. It is caused by excess growth of certain bacteria that are normally found in the vagina. Symptoms can include itching, swelling, pain when you urinate or have sex, and a gray or yellow discharge with a \"fishy\" odor. It is not considered an infection that is spread through sexual contact. Although symptoms can be annoying and uncomfortable, bacterial vaginosis does not usually cause other health problems. However, if you have it while you are pregnant, it can cause complications. While the infection may go away on its own, most doctors use antibiotics to treat it. You may have been prescribed pills or vaginal cream. With treatment, bacterial vaginosis usually clears up in 5 to 7 days. Follow-up care is a key part of your treatment and safety. Be sure to make and go to all appointments, and call your doctor if you are having problems. It's also a good idea to know your test results and keep a list of the medicines you take. How can you care for yourself at home? · Take your antibiotics as directed. Do not stop taking them just because you feel better. You need to take the full course of antibiotics. · Do not eat or drink anything that contains alcohol if you are taking metronidazole (Flagyl). · Keep using your medicine if you start your period. Use pads instead of tampons while using a vaginal cream or suppository. Tampons can absorb the medicine. · Wear loose cotton clothing. Do not wear nylon and other materials that hold body heat and moisture close to the skin. · Do not scratch. Relieve itching with a cold pack or a cool bath. · Do not wash your vaginal area more than once a day. Use plain water or a mild, unscented soap. Do not douche. When should you call for help?   Watch closely for changes in your health, and be sure to contact your doctor if:    · You have unexpected vaginal bleeding.     · You have a fever.     · You have new or increased pain in your vagina or pelvis.     · You are not getting better after 1 week.     · Your symptoms return after you finish the course of your medicine. Where can you learn more? Go to http://jelani-daniel.info/. Eusebia Santos in the search box to learn more about \"Bacterial Vaginosis: Care Instructions. \"  Current as of: May 14, 2018  Content Version: 11.9  © 7315-3116 [employer]. Care instructions adapted under license by [employer] (which disclaims liability or warranty for this information). If you have questions about a medical condition or this instruction, always ask your healthcare professional. [address] any warranty or liability for your use of this information.

## 2019-05-31 NOTE — ED NOTES
Patient presents to ED with c/o vaginal discharge for 1 week. Emergency Department Nursing Plan of Care       The Nursing Plan of Care is developed from the Nursing assessment and Emergency Department Attending provider initial evaluation. The plan of care may be reviewed in the ED Provider note.     The Plan of Care was developed with the following considerations:   Patient / Family readiness to learn indicated by:verbalized understanding and successful return demonstration  Persons(s) to be included in education: patient  Barriers to Learning/Limitations:No    Signed     [address] RN    5/31/2019  11:00 PM

## 2019-05-31 NOTE — ED PROVIDER NOTES
EMERGENCY DEPARTMENT HISTORY AND PHYSICAL EXAM    Date: 5/31/2019  Patient Name: Ailyn Mesa    History of Presenting Illness     Chief Complaint   Patient presents with    Vaginal Discharge    Weight Loss         History Provided By: Patient    Chief Complaint: vaginal discharge  Duration: one week  Timing:  Acute  Location: vaginal  Quality: Thick white malodorous  Severity: Moderate  Modifying Factors: none  Associated Symptoms: denies any other associated signs or symptoms      HPI: Ailyn Mesa is a 35 y.o. female with a PMH of IBS who presents with will discharge for 1 week. Patient states she had BV and did not complete a course of antibiotics. Patient denies abdominal pain dysuria hematuria. She denies nausea vomiting. Patient also reports loss of appetite. States she is homeless and has a lot of stress. Patient denies weakness heavy periods dizziness. States she just does not feel like eating. PCP: Joaquin Muñoz NP    Current Outpatient Medications   Medication Sig Dispense Refill    metroNIDAZOLE (FLAGYL) 500 mg tablet Take 1 Tab by mouth two (2) times a day for 7 days. 14 Tab 0    cephALEXin (KEFLEX) 500 mg capsule Take 1 Cap by mouth three (3) times daily for 7 days. 21 Cap 0    ALPRAZolam (XANAX) 1 mg tablet TAKE 1 TABLET BY MOUTH TWICE A DAY AS NEEDED  3    OXcarbazepine (TRILEPTAL) 300 mg tablet Take 1 Tab by mouth three (3) times daily. Please take 1 tablet twice a day for 4 days and then take three times a day. 90 Tab 5    albuterol (PROVENTIL HFA, VENTOLIN HFA, PROAIR HFA) 90 mcg/actuation inhaler Take 1-2 Puffs by inhalation every four (4) hours as needed for Wheezing.  1 Inhaler 1       Past History     Past Medical History:  Past Medical History:   Diagnosis Date    Asthma     Back pain     Bipolar 1 disorder (HCC)     Gastrointestinal disorder     IBS    Other ill-defined conditions(689.39)     Frequent Boils    Scoliosis        Past Surgical History:  Past Surgical History:   Procedure Laterality Date    HX TONSILLECTOMY      HX WISDOM TEETH EXTRACTION         Family History:  Family History   Problem Relation Age of Onset    Cancer Neg Hx     Hypertension Neg Hx     Diabetes Neg Hx     Stroke Neg Hx        Social History:  Social History     Tobacco Use    Smoking status: Former Smoker     Packs/day: 0.50     Years: 2.00     Pack years: 1.00     Last attempt to quit: 2013     Years since quittin.8    Smokeless tobacco: Never Used    Tobacco comment: denies   Substance Use Topics    Alcohol use: No     Alcohol/week: 0.0 oz    Drug use: No       Allergies: Allergies   Allergen Reactions    Naprosyn [Naproxen] Nausea and Vomiting         Review of Systems   Review of Systems   Constitutional: Positive for appetite change. Negative for fatigue and fever. Respiratory: Negative for shortness of breath and wheezing. Cardiovascular: Negative for chest pain and palpitations. Gastrointestinal: Negative for abdominal pain. Genitourinary: Positive for vaginal discharge. Musculoskeletal: Negative for arthralgias, myalgias, neck pain and neck stiffness. Skin: Negative for pallor and rash. Neurological: Negative for dizziness, tremors, weakness and headaches. Hematological: Negative for adenopathy. All other systems reviewed and are negative. Physical Exam     Vitals:    19 1043   BP: 102/65   Pulse: 84   Resp: 15   Temp: 98.6 °F (37 °C)   SpO2: 100%   Weight: 62.6 kg (137 lb 14.4 oz)   Height: 5' 5\" (1.651 m)     Physical Exam   Constitutional: She is oriented to person, place, and time. She appears well-developed and well-nourished. No distress. HENT:   Head: Normocephalic and atraumatic. Right Ear: External ear normal.   Left Ear: External ear normal.   Nose: Nose normal.   Mouth/Throat: Oropharynx is clear and moist.   Eyes: Conjunctivae are normal.   Neck: Normal range of motion. Neck supple.    Cardiovascular: Normal rate, regular rhythm and normal heart sounds. Pulmonary/Chest: Effort normal and breath sounds normal. No respiratory distress. She has no wheezes. Abdominal: Soft. Bowel sounds are normal. There is no tenderness. Musculoskeletal: Normal range of motion. Lymphadenopathy:     She has no cervical adenopathy. Neurological: She is alert and oriented to person, place, and time. No cranial nerve deficit. Coordination normal.   Skin: Skin is warm and dry. No rash noted. Psychiatric: She has a normal mood and affect. Her behavior is normal. Judgment and thought content normal.   Nursing note and vitals reviewed. Patient self swabbed to obtain cultures.     Diagnostic Study Results     Labs -     Recent Results (from the past 12 hour(s))   HARRIS, OTHER SOURCES    Collection Time: 05/31/19 11:43 AM   Result Value Ref Range    Special Requests: NO SPECIAL REQUESTS      KOH NO YEAST SEEN     WET PREP    Collection Time: 05/31/19 11:43 AM   Result Value Ref Range    Clue cells CLUE CELLS PRESENT      Wet prep NO TRICHOMONAS SEEN     URINALYSIS W/ REFLEX CULTURE    Collection Time: 05/31/19 11:43 AM   Result Value Ref Range    Color YELLOW/STRAW      Appearance CLOUDY (A) CLEAR      Specific gravity 1.025 1.003 - 1.030      pH (UA) 7.5 5.0 - 8.0      Protein NEGATIVE  NEG mg/dL    Glucose NEGATIVE  NEG mg/dL    Ketone NEGATIVE  NEG mg/dL    Bilirubin NEGATIVE  NEG      Blood NEGATIVE  NEG      Urobilinogen 1.0 0.2 - 1.0 EU/dL    Nitrites POSITIVE (A) NEG      Leukocyte Esterase NEGATIVE  NEG      WBC 0-4 0 - 4 /hpf    RBC 0-5 0 - 5 /hpf    Epithelial cells FEW FEW /lpf    Bacteria 4+ (A) NEG /hpf    UA:UC IF INDICATED URINE CULTURE ORDERED (A) CNI     CBC WITH AUTOMATED DIFF    Collection Time: 05/31/19 12:05 PM   Result Value Ref Range    WBC 5.4 3.6 - 11.0 K/uL    RBC 3.99 3.80 - 5.20 M/uL    HGB 11.9 11.5 - 16.0 g/dL    HCT 37.8 35.0 - 47.0 %    MCV 94.7 80.0 - 99.0 FL    MCH 29.8 26.0 - 34.0 PG    MCHC 31.5 30.0 - 36.5 g/dL    RDW 12.2 11.5 - 14.5 %    PLATELET 966 020 - 587 K/uL    MPV 9.5 8.9 - 12.9 FL    NRBC 0.0 0  WBC    ABSOLUTE NRBC 0.00 0.00 - 0.01 K/uL    NEUTROPHILS 55 32 - 75 %    LYMPHOCYTES 34 12 - 49 %    MONOCYTES 8 5 - 13 %    EOSINOPHILS 2 0 - 7 %    BASOPHILS 1 0 - 1 %    IMMATURE GRANULOCYTES 0 0.0 - 0.5 %    ABS. NEUTROPHILS 3.1 1.8 - 8.0 K/UL    ABS. LYMPHOCYTES 1.8 0.8 - 3.5 K/UL    ABS. MONOCYTES 0.4 0.0 - 1.0 K/UL    ABS. EOSINOPHILS 0.1 0.0 - 0.4 K/UL    ABS. BASOPHILS 0.0 0.0 - 0.1 K/UL    ABS. IMM. GRANS. 0.0 0.00 - 0.04 K/UL    DF AUTOMATED     METABOLIC PANEL, COMPREHENSIVE    Collection Time: 05/31/19 12:05 PM   Result Value Ref Range    Sodium 141 136 - 145 mmol/L    Potassium 4.0 3.5 - 5.1 mmol/L    Chloride 107 97 - 108 mmol/L    CO2 27 21 - 32 mmol/L    Anion gap 7 5 - 15 mmol/L    Glucose 86 65 - 100 mg/dL    BUN 9 6 - 20 MG/DL    Creatinine 0.94 0.55 - 1.02 MG/DL    BUN/Creatinine ratio 10 (L) 12 - 20      GFR est AA >60 >60 ml/min/1.73m2    GFR est non-AA >60 >60 ml/min/1.73m2    Calcium 8.1 (L) 8.5 - 10.1 MG/DL    Bilirubin, total 0.3 0.2 - 1.0 MG/DL    ALT (SGPT) 7 (L) 12 - 78 U/L    AST (SGOT) 10 (L) 15 - 37 U/L    Alk. phosphatase 58 45 - 117 U/L    Protein, total 6.2 (L) 6.4 - 8.2 g/dL    Albumin 3.0 (L) 3.5 - 5.0 g/dL    Globulin 3.2 2.0 - 4.0 g/dL    A-G Ratio 0.9 (L) 1.1 - 2.2     HCG URINE, QL. - POC    Collection Time: 05/31/19 12:18 PM   Result Value Ref Range    Pregnancy test,urine (POC) NEGATIVE  NEG         Radiologic Studies -   No orders to display     CT Results  (Last 48 hours)    None        CXR Results  (Last 48 hours)    None            Medical Decision Making   I am the first provider for this patient. I reviewed the vital signs, available nursing notes, past medical history, past surgical history, family history and social history. Vital Signs-Reviewed the patient's vital signs.     Records Reviewed: Nursing Notes            Disposition:  home    DISCHARGE NOTE:           Care plan outlined and precautions discussed. Patient has no new complaints, changes, or physical findings. Results of tests were reviewed with the patient. All medications were reviewed with the patient; will d/c home with keflex flagyl. All of pt's questions and concerns were addressed. Patient was instructed and agrees to follow up with PCP, as well as to return to the ED upon further deterioration. Patient is ready to go home. Follow-up Information     Follow up With Specialties Details Why [facility] [place] [place] Joey Santillan, NADER Nurse Practitioner In 2 days  [facility]  [address]  [phone]            Discharge Medication List as of 5/31/2019 12:44 PM      START taking these medications    Details   metroNIDAZOLE (FLAGYL) 500 mg tablet Take 1 Tab by mouth two (2) times a day for 7 days. , Normal, Disp-14 Tab, R-0      cephALEXin (KEFLEX) 500 mg capsule Take 1 Cap by mouth three (3) times daily for 7 days. , Normal, Disp-21 Cap, R-0         CONTINUE these medications which have NOT CHANGED    Details   ALPRAZolam (XANAX) 1 mg tablet TAKE 1 TABLET BY MOUTH TWICE A DAY AS NEEDED, Historical Med, R-3      OXcarbazepine (TRILEPTAL) 300 mg tablet Take 1 Tab by mouth three (3) times daily. Please take 1 tablet twice a day for 4 days and then take three times a day., Normal, Disp-90 Tab, R-5      albuterol (PROVENTIL HFA, VENTOLIN HFA, PROAIR HFA) 90 mcg/actuation inhaler Take 1-2 Puffs by inhalation every four (4) hours as needed for Wheezing., Normal, Disp-1 Inhaler, R-1             Provider Notes (Medical Decision Making):   DDX BV UTI candidiasis stress reaction anorexia  Procedures:  Procedures        Diagnosis     Clinical Impression:   1. BV (bacterial vaginosis)    2.  Urinary tract infection without hematuria, site unspecified

## 2019-06-02 LAB
BACTERIA SPEC CULT: ABNORMAL
CC UR VC: ABNORMAL
SERVICE CMNT-IMP: ABNORMAL

## 2019-06-03 LAB
C TRACH DNA SPEC QL NAA+PROBE: NEGATIVE
N GONORRHOEA DNA SPEC QL NAA+PROBE: NEGATIVE
SAMPLE TYPE: NORMAL
SERVICE CMNT-IMP: NORMAL
SPECIMEN SOURCE: NORMAL

## 2019-08-07 ENCOUNTER — TELEPHONE (OUTPATIENT)
Dept: INTERNAL MEDICINE CLINIC | Age: 34
End: 2019-08-07

## 2019-08-07 NOTE — TELEPHONE ENCOUNTER
Thanks, but I will see her FIRST and ensure it is still appropriate to resume during her visit and THEN prescribe. She may pick it up from downstairs and return for the injection, the same day, after her visit.

## 2019-08-07 NOTE — TELEPHONE ENCOUNTER
----- Message from [employer] sent at 8/7/2019  9:53 AM EDT -----  Regarding: NADER Cosme/telephone  Contact: [phone]  Pt scheduled for 8/8/19 at 8:15AM to start Depro-Provera. Pt advised she thinks NADER Cosme needs to call in a prescription for it to make sure that it's ready. [facility] pharmacy on file. Best contact number [phone].

## 2019-08-15 NOTE — TELEPHONE ENCOUNTER
Pt called to get rx for depo. She has appt tomorrow morning with you.   She wants it sent to [facility] on [facility] on file

## 2019-08-16 ENCOUNTER — OFFICE VISIT (OUTPATIENT)
Dept: INTERNAL MEDICINE CLINIC | Age: 34
End: 2019-08-16

## 2019-08-16 VITALS
SYSTOLIC BLOOD PRESSURE: 102 MMHG | RESPIRATION RATE: 18 BRPM | DIASTOLIC BLOOD PRESSURE: 73 MMHG | HEART RATE: 80 BPM | WEIGHT: 130.1 LBS | BODY MASS INDEX: 21.67 KG/M2 | OXYGEN SATURATION: 99 % | HEIGHT: 65 IN | TEMPERATURE: 98.5 F

## 2019-08-16 DIAGNOSIS — F31.9 BIPOLAR 1 DISORDER (HCC): ICD-10-CM

## 2019-08-16 DIAGNOSIS — J45.40 MODERATE PERSISTENT ASTHMA WITHOUT COMPLICATION: ICD-10-CM

## 2019-08-16 DIAGNOSIS — N76.0 ACUTE VAGINITIS: Primary | ICD-10-CM

## 2019-08-16 DIAGNOSIS — N92.6 IRREGULAR MENSES: ICD-10-CM

## 2019-08-16 LAB
HCG URINE, QL. (POC): NEGATIVE
VALID INTERNAL CONTROL?: YES

## 2019-08-16 RX ORDER — QUETIAPINE FUMARATE 100 MG/1
TABLET, FILM COATED ORAL
Refills: 1 | COMMUNITY
Start: 2019-06-27 | End: 2019-08-16 | Stop reason: SDUPTHER

## 2019-08-16 RX ORDER — QUETIAPINE FUMARATE 100 MG/1
TABLET, FILM COATED ORAL
Qty: 30 TAB | Refills: 1 | Status: SHIPPED | OUTPATIENT
Start: 2019-08-16 | End: 2022-09-09 | Stop reason: ALTCHOICE

## 2019-08-16 RX ORDER — OXCARBAZEPINE 300 MG/1
300 TABLET, FILM COATED ORAL 3 TIMES DAILY
Qty: 90 TAB | Refills: 1 | Status: SHIPPED | OUTPATIENT
Start: 2019-08-16 | End: 2019-08-16 | Stop reason: SDUPTHER

## 2019-08-16 RX ORDER — FLUCONAZOLE 150 MG/1
150 TABLET ORAL DAILY
Qty: 1 TAB | Refills: 0 | Status: SHIPPED | OUTPATIENT
Start: 2019-08-16 | End: 2019-09-20 | Stop reason: SDUPTHER

## 2019-08-16 RX ORDER — FLUCONAZOLE 150 MG/1
150 TABLET ORAL DAILY
Qty: 1 TAB | Refills: 0 | Status: SHIPPED | OUTPATIENT
Start: 2019-08-16 | End: 2019-08-16 | Stop reason: SDUPTHER

## 2019-08-16 RX ORDER — QUETIAPINE FUMARATE 100 MG/1
TABLET, FILM COATED ORAL
Qty: 30 TAB | Refills: 1 | Status: SHIPPED | OUTPATIENT
Start: 2019-08-16 | End: 2019-08-16 | Stop reason: SDUPTHER

## 2019-08-16 RX ORDER — OXCARBAZEPINE 300 MG/1
300 TABLET, FILM COATED ORAL 3 TIMES DAILY
Qty: 90 TAB | Refills: 1 | Status: SHIPPED | OUTPATIENT
Start: 2019-08-16 | End: 2022-07-22

## 2019-08-16 RX ORDER — ALBUTEROL SULFATE 90 UG/1
1-2 AEROSOL, METERED RESPIRATORY (INHALATION)
Qty: 1 INHALER | Refills: 1 | Status: SHIPPED | OUTPATIENT
Start: 2019-08-16 | End: 2019-08-16 | Stop reason: SDUPTHER

## 2019-08-16 RX ORDER — ALBUTEROL SULFATE 90 UG/1
1-2 AEROSOL, METERED RESPIRATORY (INHALATION)
Qty: 1 INHALER | Refills: 1 | Status: SHIPPED | OUTPATIENT
Start: 2019-08-16 | End: 2019-09-20 | Stop reason: SDUPTHER

## 2019-08-16 RX ORDER — ALPRAZOLAM 1 MG/1
TABLET ORAL
Qty: 30 TAB | Refills: 1 | Status: SHIPPED | OUTPATIENT
Start: 2019-08-16 | End: 2019-09-20 | Stop reason: SDUPTHER

## 2019-08-16 RX ORDER — BUPROPION HYDROCHLORIDE 150 MG/1
TABLET ORAL
Refills: 0 | COMMUNITY
Start: 2019-05-28 | End: 2019-08-16

## 2019-08-16 NOTE — PATIENT INSTRUCTIONS
1. Diflucan sent for yeast    2. Please discuss birth control with your OBGYN - there showed an interaction with depo and trileptal - may make the depo less effective (chance of pregnancy)    3.  Please follow up with psychiatry as planned

## 2019-08-16 NOTE — PROGRESS NOTES
Pt here for   Chief Complaint   Patient presents with    Medication Refill     Pt needs pysch med refill does not see behavior health until a month a half    Vaginal Itching     1. Have you been to the ER, urgent care clinic since your last visit? Hospitalized since your last visit? No    2. Have you seen or consulted any other health care providers outside of the [address] [place] [address] since your last visit? Include any pap smears or colon screening.  No       Pt denies pain at this time      3 most recent PHQ Screens 8/16/2019   PHQ Not Done Active Diagnosis of Depression or Bipolar Disorder   Little interest or pleasure in doing things -   Feeling down, depressed, irritable, or hopeless -   Total Score PHQ 2 -

## 2019-08-16 NOTE — PROGRESS NOTES
Subjective: (As above and below)     Chief Complaint   Patient presents with    Medication Refill     Pt needs pysch med refill does not see behavior health until a month a half    Vaginal Itching     Truman Botello is a 29y.o. year old female who presents for specific concerns:    Vaginal itching after abx for BV from the ED - she reports white vaginal discharge and itching. Also request STI testing. No new partners    Psychiatry medication refill: she has established w/ a new psychiatrist - appt not until next month.  shows last xanax refill on 7/10 from provider in [place] that has been writing for her meds long term. Unclear why she is not returning there. Dr. Cheryl Dos Santos    Depo request: she requests depo for birth control. She has a hx of irregular menses - last cycle approx 1 month ago. She has not been on depo x approx 1 year - was not given by last provider bc of her age and she was still smoking. She states that with the help of patches she quit smoking 4 months ago. She desires depo for contraception. She states that her OBGYN told her the irregular cycles were likely due to stress. She has an appt there next month at [facility]. Reviewed PmHx, RxHx, FmHx, SocHx, AllgHx and updated in chart.   Family History   Problem Relation Age of Onset    Cancer Neg Hx     Hypertension Neg Hx     Diabetes Neg Hx     Stroke Neg Hx        Past Medical History:   Diagnosis Date    Asthma     Back pain     Bipolar 1 disorder (HCC)     Gastrointestinal disorder     IBS    Other ill-defined conditions(149.89)     Frequent Boils    Scoliosis       Social History     Socioeconomic History    Marital status: SINGLE     Spouse name: Not on file    Number of children: Not on file    Years of education: Not on file    Highest education level: Not on file   Tobacco Use    Smoking status: Former Smoker     Packs/day: 0.50     Years: 2.00     Pack years: 1.00     Last attempt to quit: 8/5/2013     Years since quittin.0    Smokeless tobacco: Never Used    Tobacco comment: denies   Substance and Sexual Activity    Alcohol use: No     Alcohol/week: 0.0 standard drinks    Drug use: No    Sexual activity: Yes     Partners: Male     Birth control/protection: Injection          Current Outpatient Medications   Medication Sig    ALPRAZolam (XANAX) 1 mg tablet TAKE 1 TABLET BY MOUTH TWICE A DAY AS NEEDED    QUEtiapine (SEROQUEL) 100 mg tablet TAKE 1 TABLET BY MOUTH AT BEDTIME    OXcarbazepine (TRILEPTAL) 300 mg tablet Take 1 Tab by mouth three (3) times daily.  albuterol (PROVENTIL HFA, VENTOLIN HFA, PROAIR HFA) 90 mcg/actuation inhaler Take 1-2 Puffs by inhalation every four (4) hours as needed for Wheezing.  fluconazole (DIFLUCAN) 150 mg tablet Take 1 Tab by mouth daily for 1 day. FDA advises cautious prescribing of oral fluconazole in pregnancy. No current facility-administered medications for this visit. Review of Systems:   Constitutional:    Negative for fever and chills, negative diaphoresis. HEENT:              Negative for neck pain and stiffness. Eyes:                  Negative for visual disturbance, itching, redness or discharge. Respiratory:        Negative for cough and shortness of breath. Cardiovascular:  Negative for chest pain and palpitations. Gastrointestinal: Negative for nausea, vomiting, abdominal pain, diarrhea or constipation. Genitourinary:     Negative for dysuria and frequency. Musculoskeletal: Negative for falls, tenderness and swelling. Skin:                    Negative for rash, masses or lesions. Neurological:       Negative for dizzyness, seizure, loss of consciousness, weakness and numbness.      Objective:     Vitals:    19 0903   BP: 102/73   Pulse: 80   Resp: 18   Temp: 98.5 °F (36.9 °C)   TempSrc: Oral   SpO2: 99%   Weight: 130 lb 1.6 oz (59 kg)   Height: 5' 5\" (1.651 m)       Results for orders placed or performed in visit on 19   AMB POC URINE PREGNANCY TEST, VISUAL COLOR COMPARISON   Result Value Ref Range    VALID INTERNAL CONTROL POC Yes     HCG urine, Ql. (POC) Negative Negative         Physical Examination: General appearance - alert, well appearing, and in no distress  Chest - clear to auscultation, no wheezes, rales or rhonchi, symmetric air entry  Heart - normal rate, regular rhythm, normal S1, S2, no murmurs, rubs, clicks or gallops  Extremities - no pedal edema noted      Assessment/ Plan:     Follow-up and Dispositions    · Return if symptoms worsen or fail to improve, for with [facility]. Interaction btwn depo & trileptal - potential for depo to be less effective. Discussed this w/ patient. She wishes to d/w OBGYN next month  Will write for psychiatry meds today only, encouraged her to f/u w/ psych as planned next month      1. Acute vaginitis    - NUSWAB VAGINITIS PLUS  - fluconazole (DIFLUCAN) 150 mg tablet; Take 1 Tab by mouth daily for 1 day. FDA advises cautious prescribing of oral fluconazole in pregnancy. Dispense: 1 Tab; Refill: 0    2. Bipolar 1 disorder (HCC)    - ALPRAZolam (XANAX) 1 mg tablet; TAKE 1 TABLET BY MOUTH TWICE A DAY AS NEEDED  Dispense: 30 Tab; Refill: 1  - OXcarbazepine (TRILEPTAL) 300 mg tablet; Take 1 Tab by mouth three (3) times daily. Dispense: 90 Tab; Refill: 1    3. Moderate persistent asthma without complication  refill  - albuterol (PROVENTIL HFA, VENTOLIN HFA, PROAIR HFA) 90 mcg/actuation inhaler; Take 1-2 Puffs by inhalation every four (4) hours as needed for Wheezing. Dispense: 1 Inhaler; Refill: 1    4. Irregular menses    - AMB POC URINE PREGNANCY TEST, VISUAL COLOR COMPARISON        I have discussed the diagnosis with the patient and the intended plan as seen in the above orders. The patient has received an after-visit summary and questions were answered concerning future plans. Pt conveyed understanding of plan.       Medication Side Effects and Warnings were discussed with patient: yes  Patient Labs were reviewed: yes  Patient Past Records were reviewed:  yes    Fifi Sheridan.  Harper Easton NP

## 2019-08-20 ENCOUNTER — TELEPHONE (OUTPATIENT)
Dept: INTERNAL MEDICINE CLINIC | Age: 34
End: 2019-08-20

## 2019-08-20 LAB
A VAGINAE DNA VAG QL NAA+PROBE: ABNORMAL SCORE
BVAB2 DNA VAG QL NAA+PROBE: ABNORMAL SCORE
C ALBICANS DNA VAG QL NAA+PROBE: POSITIVE
C GLABRATA DNA VAG QL NAA+PROBE: NEGATIVE
C TRACH RRNA SPEC QL NAA+PROBE: NEGATIVE
MEGA1 DNA VAG QL NAA+PROBE: ABNORMAL SCORE
N GONORRHOEA RRNA SPEC QL NAA+PROBE: NEGATIVE
T VAGINALIS RRNA SPEC QL NAA+PROBE: NEGATIVE

## 2019-08-20 NOTE — TELEPHONE ENCOUNTER
simone patino  Reviewed nuswab  Treated w/ diflucan  No BV s/s so will defer tx   Pt verb/agrees w/ plan

## 2019-09-20 ENCOUNTER — OFFICE VISIT (OUTPATIENT)
Dept: INTERNAL MEDICINE CLINIC | Age: 34
End: 2019-09-20

## 2019-09-20 VITALS
TEMPERATURE: 98.1 F | DIASTOLIC BLOOD PRESSURE: 77 MMHG | HEART RATE: 77 BPM | RESPIRATION RATE: 17 BRPM | OXYGEN SATURATION: 98 % | SYSTOLIC BLOOD PRESSURE: 100 MMHG | BODY MASS INDEX: 21.99 KG/M2 | WEIGHT: 132 LBS | HEIGHT: 65 IN

## 2019-09-20 DIAGNOSIS — F31.9 BIPOLAR 1 DISORDER (HCC): Primary | ICD-10-CM

## 2019-09-20 DIAGNOSIS — J45.40 MODERATE PERSISTENT ASTHMA WITHOUT COMPLICATION: ICD-10-CM

## 2019-09-20 DIAGNOSIS — B37.31 CANDIDA VAGINITIS: ICD-10-CM

## 2019-09-20 RX ORDER — ALBUTEROL SULFATE 90 UG/1
1-2 AEROSOL, METERED RESPIRATORY (INHALATION)
Qty: 1 INHALER | Refills: 0 | Status: SHIPPED | OUTPATIENT
Start: 2019-09-20 | End: 2020-01-30 | Stop reason: SDUPTHER

## 2019-09-20 RX ORDER — ALPRAZOLAM 1 MG/1
1 TABLET ORAL
Qty: 14 TAB | Refills: 0 | Status: SHIPPED | OUTPATIENT
Start: 2019-09-20 | End: 2019-09-27

## 2019-09-20 RX ORDER — FLUCONAZOLE 150 MG/1
150 TABLET ORAL DAILY
Qty: 1 TAB | Refills: 0 | Status: SHIPPED | OUTPATIENT
Start: 2019-09-20 | End: 2019-09-21

## 2019-09-20 NOTE — PROGRESS NOTES
Pt is here for   Chief Complaint   Patient presents with    Medication Refill     orders pending. .. . pt states that she would like a refill on the diflucan     Pt denies pain at this time    1. Have you been to the ER, urgent care clinic since your last visit? Hospitalized since your last visit? No    2. Have you seen or consulted any other health care providers outside of the [address] [place] [address] since your last visit? Include any pap smears or colon screening.  No

## 2019-09-20 NOTE — PROGRESS NOTES
Cookie Crooks is a 29 y.o. female and presents with Medication Refill (orders pending. .. . pt states that she would like a refill on the diflucan)    Subjective:  Here for med refill of Xanax 1 mg. Had appt last week, but they called and rescheduled it to this upcoming Monday. Taking Xanax BID for over 10 years. Last taken yesterday. Feeling very stressed out, was evicted last year and had to move into hotel. Then stayed with her sister for a while, but had to move back to hotel. In house now, but family member with cancer and kids take psych meds too; so feels overwhelmed and meds help calm her. Otherwise she would stay to herself, not eat, or interact. She reports her children notice a change in her mood when she has taken med versus not taken, reporting she is MEAN without the med. Also with vaginal itching following antibiotics given last OV. Unable to  Diflucan sent to [facility], as she is locked in with [facility] and unable to use ANY other pharmacies. Asthma Review:  The patient is being seen for follow up of asthma,  currently stable. Asthma symptoms occur: infrequently. Wheezing when present is described as mild and easily relieved with rescue bronchodilator. The patient reports use of a steroid inhaler. Frequency of use of quick-relief meds: rarely. ~ 2 x weekly. Regimen compliance: The patient reports adherence to this regimen.     Review of Systems  Constitutional: negative for fevers, chills, anorexia and weight loss  Eyes:   negative for visual disturbance, drainage, and irritation  ENT:   + left facial pain following facial fracture 10/2016. negative for tinnitus,sore throat,nasal congestion,ear pain,and hoarseness  Respiratory:  negative for cough, hemoptysis, dyspnea, and wheezing  CV:   negative for chest pain, palpitations, and lower extremity edema  GI:   negative for nausea, vomiting, diarrhea, abdominal pain, and melena  Endo:               negative for polyuria,polydipsia,polyphagia, and heat intolerance  Genitourinary: negative for frequency, urgency, dysuria, retention, and hematuria  Integument:  negative for rash, ulcerations, and pruritus  Hematologic:  negative for easy bruising and bleeding  Musculoskel: negative for arthralgias, muscle weakness,and joint pain/swelling  Neurological:  negative for headaches, dizziness, vertigo,and memory/gait problems  Behavl/Psych: + anxiety, bipolar; followed by DR. Lucy Pisano. negative for feelings of suicide and mood changes    Past Medical History:   Diagnosis Date    Asthma     Back pain     Bipolar 1 disorder ([facility] [address]     Gastrointestinal disorder     IBS    Other ill-defined conditions(799.89)     Frequent Boils    Scoliosis      Past Surgical History:   Procedure Laterality Date    HX TONSILLECTOMY      HX WISDOM TEETH EXTRACTION       Social History     Socioeconomic History    Marital status: SINGLE     Spouse name: Not on file    Number of children: Not on file    Years of education: Not on file    Highest education level: Not on file   Tobacco Use    Smoking status: Former Smoker     Packs/day: 0.50     Years: 2.00     Pack years: 1.00     Last attempt to quit: 2013     Years since quittin.1    Smokeless tobacco: Never Used    Tobacco comment: denies   Substance and Sexual Activity    Alcohol use: No     Alcohol/week: 0.0 standard drinks    Drug use: No    Sexual activity: Yes     Partners: Male     Birth control/protection: Injection     Family History   Problem Relation Age of Onset    Cancer Neg Hx     Hypertension Neg Hx     Diabetes Neg Hx     Stroke Neg Hx      Current Outpatient Medications   Medication Sig Dispense Refill    ALPRAZolam (XANAX) 1 mg tablet Take 1 Tab by mouth two (2) times daily as needed for Anxiety for up to 7 days. Max Daily Amount: 2 mg.  TAKE 1 TABLET BY MOUTH TWICE A DAY AS NEEDED  Indications: Repeated Episodes of Anxiety 14 Tab 0    albuterol (PROVENTIL HFA, VENTOLIN HFA, PROAIR HFA) 90 mcg/actuation inhaler Take 1-2 Puffs by inhalation every four (4) hours as needed for Wheezing. 1 Inhaler 0    fluconazole (DIFLUCAN) 150 mg tablet Take 1 Tab by mouth daily for 1 day. FDA advises cautious prescribing of oral fluconazole in pregnancy. 1 Tab 0    QUEtiapine (SEROQUEL) 100 mg tablet TAKE 1 TABLET BY MOUTH AT BEDTIME 30 Tab 1    OXcarbazepine (TRILEPTAL) 300 mg tablet Take 1 Tab by mouth three (3) times daily. 90 Tab 1     Allergies   Allergen Reactions    Naprosyn [Naproxen] Nausea and Vomiting       Objective:  Visit Vitals  /77 (BP 1 Location: Left arm, BP Patient Position: Sitting)   Pulse 77   Temp 98.1 °F (36.7 °C) (Oral)   Resp 17   Ht 5' 5\" (1.651 m)   Wt 132 lb (59.9 kg)   LMP 09/10/2019   SpO2 98%   BMI 21.97 kg/m²     Wt Readings from Last 3 Encounters:   09/20/19 132 lb (59.9 kg)   08/16/19 130 lb 1.6 oz (59 kg)   05/31/19 137 lb 14.4 oz (62.6 kg)     Physical Exam:   General appearance - alert, well appearing, and in mild distress. Mental status - A/O x 4, normal mood and affect. Neck - No JVD. Chest - CTA. Symmetric chest rise. No wheezing, rales or rhonchi. Heart - Normal rate, regular rhythm. Normal S1, S2. No MGR or clicks. Abdomen - Soft,non-distended. Normoactive BS in all quadrants. NT, no mass or HSM. No CVAT. Ext- Radial, DP pulses, 2+ bilaterally. No pedal edema, clubbing, or cyanosis. Skin-Warm and dry. No hyperpigmentation, ulcerations, or suspicious lesions. Neuro - Normal speech, no focal findings or movement disorder. Normal strength, gait, and muscle tone. Assessment/Plan:  Xanax 1 mg #14 tabs.  was reviewed while planning for pain/anxiety management, no indications of drug diversion suspected. Prescription history is not suspicious for controlled substance overuse.   Medication Side Effects and Warnings were discussed with patient: yes   Patient Labs were reviewed: yes  Patient Past Records were reviewed: yes    See below for other orders   Follow-up and Dispositions    · Return in about 4 weeks (around 10/18/2019) for annual with labs. ICD-10-CM ICD-9-CM    1. Bipolar 1 disorder (HCC) F31.9 296.7 ALPRAZolam (XANAX) 1 mg tablet   2. Moderate persistent asthma without complication N14.57 563.49 albuterol (PROVENTIL HFA, VENTOLIN HFA, PROAIR HFA) 90 mcg/actuation inhaler   3. Candida vaginitis B37.3 112.1 fluconazole (DIFLUCAN) 150 mg tablet     Orders Placed This Encounter    ALPRAZolam (XANAX) 1 mg tablet     Sig: Take 1 Tab by mouth two (2) times daily as needed for Anxiety for up to 7 days. Max Daily Amount: 2 mg. TAKE 1 TABLET BY MOUTH TWICE A DAY AS NEEDED  Indications: Repeated Episodes of Anxiety     Dispense:  14 Tab     Refill:  0    albuterol (PROVENTIL HFA, VENTOLIN HFA, PROAIR HFA) 90 mcg/actuation inhaler     Sig: Take 1-2 Puffs by inhalation every four (4) hours as needed for Wheezing. Dispense:  1 Inhaler     Refill:  0    fluconazole (DIFLUCAN) 150 mg tablet     Sig: Take 1 Tab by mouth daily for 1 day. FDA advises cautious prescribing of oral fluconazole in pregnancy. Dispense:  1 Tab     Refill:  0       Yue Galloway expressed understanding of plan. An After Visit Summary was offered/printed and given to the patient.

## 2019-09-20 NOTE — PATIENT INSTRUCTIONS
Learning About Guided Imagery for Stress  What are guided imagery and stress? Stress is what you feel when you have to handle more than you are used to. A lot of things can cause stress. You may feel stress when you go on a job interview, take a test, or run a race. This kind of short-term stress is normal and even useful. It can help you if you need to work hard or react quickly. Stress also can last a long time. Long-term stress is caused by stressful situations or events. Examples of long-term stress include long-term health problems, ongoing problems at work, and conflicts in your family. Long-term stress can harm your health. Guided imagery is a technique of directed thoughts and suggestions that guide your mind toward a relaxed, focused state. This technique helps you use your mind to take you to a calm, peaceful place. You can use it to relax, which can lower blood pressure and reduce other problems related to stress. How does guided imagery help to relieve stress? Because of the way the mind and body are connected, guided imagery can make you feel like you are experiencing something just by imagining it. You can achieve a relaxed state when you imagine all the details of a safe, comfortable place, such as a beach or a garden. This relaxed state may help you feel more in control of your emotions and thought processes. Feeling in control may improve your attitude, health, and sense of well-being. How do you do guided imagery? You can use a smartphone mandeep or a video to lead you through the process. You use all of your senses in guided imagery. For example, if you want a tropical setting, you can imagine the warm breeze on your skin, the bright blue of the water, the sound of the surf, the sweet scent of tropical flowers, and the taste of coconut. Imagining those things can make you actually feel like you're there. But you don't have to imagine the tropics to feel peace.  Guided imagery can take you to your own restful place. To give guided imagery a try, follow these steps:  · Lean back comfortably in your chair. If you can, close your eyes. Put your arms on the armrests, or fold your hands in your lap. · Take a deep breath through your nose. Breathe in slowly, and then let the air out completely through your mouth. · Do it again slowly. Keep breathing like this. Gather up any tension in your body, and send it out with every breath. · Let a feeling of warmth spread from your lungs to your neck and head, down your arms to your fingertips, through your body and into your legs, all the way down to your toes. Stay this way for a moment. · Now imagine a pleasant day. You're at a park or at a place you've visited in the past where you felt at peace. · In your mind's eye, look at what lies in front of you. Maybe you see the sun, filtered through trees. Maybe clouds are drifting by. · Look to one side, and then the other. Notice the feel of the air around you. Notice how it feels on your face and on your arms. · Stay here for a while. Let it become real for you. Follow-up care is a key part of your treatment and safety. Be sure to make and go to all appointments, and call your doctor if you are having problems. It's also a good idea to know your test results and keep a list of the medicines you take. Where can you learn more? Go to http://jelani-daniel.info/. Enter N291 in the search box to learn more about \"Learning About Guided Imagery for Stress. \"  Current as of: April 7, 2019  Content Version: 12.2  © 8419-7749 [employer]. Care instructions adapted under license by [employer] (which disclaims liability or warranty for this information). If you have questions about a medical condition or this instruction, always ask your healthcare professional. [address] any warranty or liability for your use of this information.          Learning About Progressive Muscle Relaxation for Stress  What are progressive muscle relaxation and stress? Stress is what you feel when you have to handle more than you are used to. A lot of things can cause stress. You may feel stress when you go on a job interview, take a test, or run a race. This kind of short-term stress is normal and even useful. It can help you if you need to work hard or react quickly. Stress also can last a long time. Long-term stress is caused by stressful situations or events. Examples of long-term stress include long-term health problems, ongoing problems at work, and conflicts in your family. Long-term stress can harm your health. When you have anxiety or stress in your life, one of the ways your body responds is with muscle tension. Progressive muscle relaxation is a method that helps relieve that tension. How does progressive muscle relaxation reduce stress? The body responds to stress with muscle tension, which can cause pain or discomfort. In turn, tense muscles relay to the body that it's stressed. That keeps the cycle of stress and muscle tension going. Progressive muscle relaxation helps break this cycle by reducing muscle tension and general mental anxiety. This method often helps people get to sleep. How do you do progressive muscle relaxation? To do progressive muscle relaxation, first you tense a group of muscles as you breathe in. Then you relax them as you breathe out. Notice how your muscles feel when you relax them. You work on your muscle groups in a certain order. Through practice, you can learn to feel the difference between a tensed muscle and a relaxed muscle. Then you can learn how to turn on this relaxed state at the first sign of a muscle tensing up due to stress. Practicing this method for a few weeks will help you get better at this skill. In time you'll be able to use this method to relieve stress.  When you first start, it may help to use an audio recording until you learn all the muscle groups in order. Check online for these recordings. Choose a place where you won't be interrupted. It should have space for you to lie down on your back and stretch out comfortably, such as on a carpeted floor. Follow-up care is a key part of your treatment and safety. Be sure to make and go to all appointments, and call your doctor if you are having problems. It's also a good idea to know your test results and keep a list of the medicines you take. Where can you learn more? Go to http://jelani-daniel.info/. Enter E949 in the search box to learn more about \"Learning About Progressive Muscle Relaxation for Stress. \"  Current as of: April 7, 2019  Content Version: 12.2  © 5473-4760 [employer]. Care instructions adapted under license by [employer] (which disclaims liability or warranty for this information). If you have questions about a medical condition or this instruction, always ask your healthcare professional. [address] any warranty or liability for your use of this information.

## 2020-01-07 DIAGNOSIS — Z30.42 ENCOUNTER FOR DEPO-PROVERA CONTRACEPTION: ICD-10-CM

## 2020-01-07 RX ORDER — MEDROXYPROGESTERONE ACETATE 150 MG/ML
150 INJECTION, SUSPENSION INTRAMUSCULAR
Qty: 1 ML | Refills: 0 | Status: SHIPPED | OUTPATIENT
Start: 2020-01-07 | End: 2020-01-30 | Stop reason: SDUPTHER

## 2020-01-30 ENCOUNTER — OFFICE VISIT (OUTPATIENT)
Dept: INTERNAL MEDICINE CLINIC | Age: 35
End: 2020-01-30

## 2020-01-30 VITALS
BODY MASS INDEX: 22.82 KG/M2 | DIASTOLIC BLOOD PRESSURE: 71 MMHG | HEART RATE: 64 BPM | HEIGHT: 65 IN | WEIGHT: 137 LBS | RESPIRATION RATE: 18 BRPM | OXYGEN SATURATION: 99 % | TEMPERATURE: 97.8 F | SYSTOLIC BLOOD PRESSURE: 105 MMHG

## 2020-01-30 DIAGNOSIS — K08.9 CHRONIC DENTAL PAIN: ICD-10-CM

## 2020-01-30 DIAGNOSIS — G89.29 CHRONIC DENTAL PAIN: ICD-10-CM

## 2020-01-30 DIAGNOSIS — K03.81 CRACKED TOOTH: ICD-10-CM

## 2020-01-30 DIAGNOSIS — J45.40 MODERATE PERSISTENT ASTHMA WITHOUT COMPLICATION: ICD-10-CM

## 2020-01-30 DIAGNOSIS — N92.6 IRREGULAR MENSES: Primary | ICD-10-CM

## 2020-01-30 DIAGNOSIS — Z30.42 ENCOUNTER FOR DEPO-PROVERA CONTRACEPTION: ICD-10-CM

## 2020-01-30 LAB
HCG URINE, QL. (POC): NEGATIVE
VALID INTERNAL CONTROL?: YES

## 2020-01-30 RX ORDER — ALBUTEROL SULFATE 90 UG/1
1-2 AEROSOL, METERED RESPIRATORY (INHALATION)
Qty: 1 INHALER | Refills: 0 | Status: SHIPPED | OUTPATIENT
Start: 2020-01-30 | End: 2021-12-29 | Stop reason: SDUPTHER

## 2020-01-30 RX ORDER — MIRTAZAPINE 15 MG/1
TABLET, FILM COATED ORAL
COMMUNITY
Start: 2019-12-16 | End: 2020-07-06 | Stop reason: ALTCHOICE

## 2020-01-30 RX ORDER — HYDROCODONE BITARTRATE AND ACETAMINOPHEN 7.5; 325 MG/1; MG/1
1 TABLET ORAL
Qty: 20 TAB | Refills: 0 | Status: SHIPPED | OUTPATIENT
Start: 2020-01-30 | End: 2020-02-19

## 2020-01-30 RX ORDER — MEDROXYPROGESTERONE ACETATE 150 MG/ML
150 INJECTION, SUSPENSION INTRAMUSCULAR
Qty: 1 ML | Refills: 0 | Status: SHIPPED | OUTPATIENT
Start: 2020-01-30 | End: 2020-11-02 | Stop reason: SDUPTHER

## 2020-01-30 RX ORDER — DOXYCYCLINE 100 MG/1
CAPSULE ORAL
COMMUNITY
Start: 2020-01-16 | End: 2020-07-06 | Stop reason: ALTCHOICE

## 2020-01-30 RX ORDER — ALPRAZOLAM 1 MG/1
TABLET ORAL
COMMUNITY
Start: 2020-01-16

## 2020-01-30 RX ORDER — CLONIDINE HYDROCHLORIDE 0.2 MG/1
TABLET ORAL
COMMUNITY
Start: 2020-01-16 | End: 2020-07-06 | Stop reason: ALTCHOICE

## 2020-01-30 RX ORDER — PREDNISONE 10 MG/1
TABLET ORAL
Qty: 21 TAB | Refills: 0 | Status: SHIPPED | OUTPATIENT
Start: 2020-01-30 | End: 2020-07-06 | Stop reason: ALTCHOICE

## 2020-01-30 NOTE — PROGRESS NOTES
Pt is here for   Chief Complaint   Patient presents with    Depo     here for depo shot    Dental Pain     pt states that she has holes in her tooth and its hurting, pt states that she has tried to schedule appointment with the dentist but they have no up coming appointments      Pt states pain level is a 2/10 back     1. Have you been to the ER, urgent care clinic since your last visit? Hospitalized since your last visit? No    2. Have you seen or consulted any other health care providers outside of the [address] [place] [address] since your last visit? Include any pap smears or colon screening.  No

## 2020-01-30 NOTE — PROGRESS NOTES
Jean Paul Gorman is a 29 y.o. female and presents with Depo (here for depo shot) and Dental Pain (pt states that she has holes in her tooth and its hurting, pt states that she has tried to schedule appointment with the dentist but they have no up coming appointments )    Subjective:  Pt here to resume depo injections for birth control and to help regulate menses. Last dose in 2018. Patient's last menstrual period was 01/01/2020 (approximate). Also having dental pain for past 2 weeks, seen in ER x 2 (1/20 with imaging and given nerve block to last until upcoming appt that she missed). Returned on 1/24 since block wore off, given 2 doses of oxycodone with some relief. Has hole in her teeth and hurting more. Tried to schedule visit with dentist, but \"no upcoming appts\", appt on 2/17. Taking motrin Q3-4H and tried Tramadol without relief. Limiting food intake, feels like she has lost weight. Feels feverish at nights, but temp not taken. Tylenol taken with relief for elevated temp. Associated with sensitivity to hot and cold, and radiating pain to right temple and ear. Throbbing, Pain Scale: 2/10.     Review of Systems  Constitutional: negative for fevers, chills, anorexia and weight loss  Eyes:   negative for visual disturbance, drainage, and irritation  ENT:   + left facial pain following facial fracture 10/2016. negative for tinnitus,sore throat,nasal congestion,ear pain,and hoarseness  Respiratory:  negative for cough, hemoptysis, dyspnea, and wheezing  CV:   negative for chest pain, palpitations, and lower extremity edema  GI:   negative for nausea, vomiting, diarrhea, abdominal pain, and melena  Endo:               negative for polyuria,polydipsia,polyphagia, and heat intolerance  Genitourinary: negative for frequency, urgency, dysuria, retention, and hematuria  Integument:  negative for rash, ulcerations, and pruritus  Hematologic:  negative for easy bruising and bleeding  Musculoskel: negative for arthralgias, muscle weakness,and joint pain/swelling  Neurological:  negative for headaches, dizziness, vertigo,and memory/gait problems  Behavl/Psych: + anxiety, bipolar; followed by DR. Justin No. negative for feelings of suicide and mood changes    Past Medical History:   Diagnosis Date    Asthma     Back pain     Bipolar 1 disorder ([address]     Gastrointestinal disorder     IBS    Other ill-defined conditions(799.89)     Frequent Boils    Scoliosis      Past Surgical History:   Procedure Laterality Date    HX TONSILLECTOMY      HX WISDOM TEETH EXTRACTION       Social History     Socioeconomic History    Marital status: SINGLE     Spouse name: Not on file    Number of children: Not on file    Years of education: Not on file    Highest education level: Not on file   Tobacco Use    Smoking status: Former Smoker     Packs/day: 0.50     Years: 2.00     Pack years: 1.00     Last attempt to quit: 2013     Years since quittin.4    Smokeless tobacco: Never Used    Tobacco comment: denies   Substance and Sexual Activity    Alcohol use: No     Alcohol/week: 0.0 standard drinks    Drug use: No    Sexual activity: Yes     Partners: Male     Birth control/protection: Injection     Family History   Problem Relation Age of Onset    Cancer Neg Hx     Hypertension Neg Hx     Diabetes Neg Hx     Stroke Neg Hx      Current Outpatient Medications   Medication Sig Dispense Refill    ALPRAZolam (XANAX) 1 mg tablet       cloNIDine HCL (CATAPRES) 0.2 mg tablet       mirtazapine (REMERON) 15 mg tablet       medroxyPROGESTERone (DEPO-PROVERA) 150 mg/mL injection 1 mL by IntraMUSCular route every three (3) months. 1 mL 0    albuterol (PROVENTIL HFA, VENTOLIN HFA, PROAIR HFA) 90 mcg/actuation inhaler Take 1-2 Puffs by inhalation every four (4) hours as needed for Wheezing.  1 Inhaler 0    QUEtiapine (SEROQUEL) 100 mg tablet TAKE 1 TABLET BY MOUTH AT BEDTIME 30 Tab 1    OXcarbazepine (TRILEPTAL) 300 mg tablet Take 1 Tab by mouth three (3) times daily. 90 Tab 1    doxycycline (MONODOX) 100 mg capsule        Allergies   Allergen Reactions    Naprosyn [Naproxen] Nausea and Vomiting       Objective:  Visit Vitals  /71 (BP 1 Location: Left arm, BP Patient Position: Sitting)   Pulse 64   Temp 97.8 °F (36.6 °C) (Oral)   Resp 18   Ht 5' 5\" (1.651 m)   Wt 137 lb (62.1 kg)   LMP 01/01/2020 (Approximate)   SpO2 99%   BMI 22.80 kg/m²     Wt Readings from Last 3 Encounters:   01/30/20 137 lb (62.1 kg)   09/20/19 132 lb (59.9 kg)   08/16/19 130 lb 1.6 oz (59 kg)     Physical Exam:   General appearance - alert, well appearing, and in mild distress. Mental status - A/O x 4, normal mood and affect. Mouth- right back upper teeth very low, near gums. Difficulty viewing gum line due to partials in place. Neck - No JVD. Chest - CTA. Symmetric chest rise. No wheezing, rales or rhonchi. Heart - Normal rate, regular rhythm. Normal S1, S2. No MGR or clicks. Abdomen - Soft,non-distended. Normoactive BS in all quadrants. NT, no mass or HSM. No CVAT. Ext- Radial, DP pulses, 2+ bilaterally. No pedal edema, clubbing, or cyanosis. Skin-Warm and dry. No hyperpigmentation, ulcerations, or suspicious lesions. Neuro - Normal speech, no focal findings or movement disorder. Normal strength, gait, and muscle tone. Assessment/Plan:  Norco #20. Prednisone dose pack.  was reviewed while planning for pain/anxiety management, no indications of drug diversion suspected. Prescription history is not suspicious for controlled substance overuse. Medication Side Effects and Warnings were discussed with patient: yes   Patient Labs were reviewed: yes  Patient Past Records were reviewed: yes    See below for other orders         ICD-10-CM ICD-9-CM    1. Encounter for Depo-Provera contraception Z30.42 V25.49 medroxyPROGESTERone (DEPO-PROVERA) 150 mg/mL injection   2.  Moderate persistent asthma without complication N85.84 171.46 albuterol (PROVENTIL HFA, VENTOLIN HFA, PROAIR HFA) 90 mcg/actuation inhaler     Orders Placed This Encounter    ALPRAZolam (XANAX) 1 mg tablet    cloNIDine HCL (CATAPRES) 0.2 mg tablet    doxycycline (MONODOX) 100 mg capsule    mirtazapine (REMERON) 15 mg tablet       Yue Fofana expressed understanding of plan. An After Visit Summary was offered/printed and given to the patient.

## 2020-01-30 NOTE — PATIENT INSTRUCTIONS
Eat soft food, gargle salt water, avoid extremes in food/drink temperatures, and call dentist. Felipe Harmon to help coat and seal your teeth until seen or Ambesol. Learning About Birth Control: The Shot  What is the shot? The shot is used to prevent pregnancy. You get the shot in your upper arm or rear end (buttocks). The shot gives you a dose of the hormone progestin. The shot is often called by its brand name, Depo-Provera. Progestin prevents pregnancy in these ways: It thickens the mucus in the cervix. This makes it hard for sperm to travel into the uterus. It also thins the lining of the uterus, which makes it harder for a fertilized egg to attach to the uterus. Progestin can sometimes stop the ovaries from releasing an egg each month (ovulation). The shot provides birth control for 3 months at a time. You then need another shot. The shot may cause bone loss. Talk to your doctor about the risks and benefits. How well does it work? In the first year of use:  · When the shot is used exactly as directed, fewer than 1 woman out of 100 has an unplanned pregnancy. · When the shot is not used exactly as directed, 6 women out of 100 have an unplanned pregnancy. Be sure to tell your doctor about any health problems you have or medicines you take. He or she can help you choose the birth control method that is right for you. What are the advantages of the shot? · The shot is one of the most effective methods of birth control. · It's convenient. You need to get a shot only once every 3 months to prevent pregnancy. You don't have to interrupt sex to protect against pregnancy. · It prevents pregnancy for 3 months at a time. You don't have to worry about birth control for this time. · It's safe to use while breastfeeding. · The shot may reduce heavy bleeding and cramping. · The shot doesn't contain estrogen.  So you can use it if you don't want to take estrogen or can't take estrogen because you have certain health problems or concerns. What are the disadvantages of the shot? · The shot doesn't protect against sexually transmitted infections (STIs), such as herpes or HIV/AIDS. If you aren't sure if your sex partner might have an STI, use a condom to protect against disease. · The shot may cause bone loss in some women. Talk to your doctor about the risks and benefits. · The shot is needed every 3 months. Any side effects may last 3 months or longer. ? The shot may cause irregular periods, or you may have spotting between periods. You may also stop getting a period. Some women see having no period as an advantage. ? It may cause mood changes, less interest in sex, or weight gain. · If you want to get pregnant, it may take up to 18 months after you stop getting the shot. This is because the hormones the shot provided have to leave your system, and your body has to readjust.  Where can you learn more? Go to http://jelani-daniel.info/. Enter O513 in the search box to learn more about \"Learning About Birth Control: The Shot. \"  Current as of: May 29, 2019  Content Version: 12.2  © 7647-1543 [employer]. Care instructions adapted under license by [employer] (which disclaims liability or warranty for this information). If you have questions about a medical condition or this instruction, always ask your healthcare professional. [address] any warranty or liability for your use of this information.

## 2020-07-06 ENCOUNTER — OFFICE VISIT (OUTPATIENT)
Dept: INTERNAL MEDICINE CLINIC | Age: 35
End: 2020-07-06

## 2020-07-06 VITALS
HEIGHT: 65 IN | DIASTOLIC BLOOD PRESSURE: 67 MMHG | RESPIRATION RATE: 17 BRPM | SYSTOLIC BLOOD PRESSURE: 104 MMHG | BODY MASS INDEX: 24.99 KG/M2 | WEIGHT: 150 LBS | TEMPERATURE: 98.1 F | OXYGEN SATURATION: 97 % | HEART RATE: 91 BPM

## 2020-07-06 DIAGNOSIS — N89.8 VAGINAL DISCHARGE: ICD-10-CM

## 2020-07-06 DIAGNOSIS — Z20.2 STD EXPOSURE: Primary | ICD-10-CM

## 2020-07-06 DIAGNOSIS — R39.15 URINARY URGENCY: ICD-10-CM

## 2020-07-06 DIAGNOSIS — M62.830 LUMBAR PARASPINAL MUSCLE SPASM: ICD-10-CM

## 2020-07-06 LAB
BILIRUB UR QL STRIP: NORMAL
GLUCOSE UR-MCNC: NEGATIVE MG/DL
KETONES P FAST UR STRIP-MCNC: NORMAL MG/DL
PH UR STRIP: 7.5 [PH] (ref 4.6–8)
PROT UR QL STRIP: NORMAL
SP GR UR STRIP: 1.02 (ref 1–1.03)
UA UROBILINOGEN AMB POC: NORMAL (ref 0.2–1)
URINALYSIS CLARITY POC: NORMAL
URINALYSIS COLOR POC: NORMAL
URINE BLOOD POC: NEGATIVE
URINE LEUKOCYTES POC: NEGATIVE
URINE NITRITES POC: NEGATIVE

## 2020-07-06 RX ORDER — FLUCONAZOLE 150 MG/1
150 TABLET ORAL AS NEEDED
Qty: 2 TAB | Refills: 0 | Status: SHIPPED | OUTPATIENT
Start: 2020-07-06 | End: 2021-05-26 | Stop reason: SDUPTHER

## 2020-07-06 RX ORDER — METHOCARBAMOL 500 MG/1
500 TABLET, FILM COATED ORAL
Qty: 40 TAB | Refills: 2 | Status: SHIPPED | OUTPATIENT
Start: 2020-07-06 | End: 2020-11-03

## 2020-07-06 RX ORDER — IBUPROFEN 800 MG/1
800 TABLET ORAL
Qty: 20 TAB | Refills: 0 | Status: SHIPPED | OUTPATIENT
Start: 2020-07-06 | End: 2020-11-03 | Stop reason: SDUPTHER

## 2020-07-06 NOTE — PATIENT INSTRUCTIONS
Back Spasm: Care Instructions  Your Care Instructions  A back spasm is sudden tightness and pain in your back muscles. It may happen from overuse or an injury. Things like sleeping in an awkward way, bending, lifting, standing, or sitting can sometimes cause a spasm. But the cause isn't always clear. Home treatment includes using heat or ice, taking over-the-counter (OTC) pain medicines, and avoiding activities that may cause back pain. For a back spasm that doesn't get better with home care, your doctor may prescribe medicine. Treatments such as massage or manipulation may also help ease a back spasm. Your doctor may also suggest exercise or physical therapy to help improve strength and flexibility in your back muscles. In most cases, getting back to your normal activities is good for your back. Just make sure to avoid doing things that make your pain worse. Follow-up care is a key part of your treatment and safety. Be sure to make and go to all appointments, and call your doctor if you are having problems. It's also a good idea to know your test results and keep a list of the medicines you take. How can you care for yourself at home? Heat, ice, and medicines  · To relieve pain, use heat or ice (whichever feels better) on the affected area. ? Put a warm water bottle, a heating pad set on low, or a warm cloth on your back. Put a thin cloth between the heating pad and your skin. Do not go to sleep with a heating pad on your skin. ? Try ice or a cold pack on the area for 10 to 20 minutes at a time. Put a thin cloth between the ice and your skin. · For most back pain you can take over-the-counter pain medicine. Nonsteroidal anti-inflammatory drugs (NSAIDs) such as ibuprofen or naproxen seem to work best. But if you can't take NSAIDs you can try acetaminophen. Your doctor can prescribe stronger medicines if needed. Be safe with medicines. Read and follow all instructions on the label.   Body positions and posture  · Sit or lie in positions that are most comfortable for you and that reduce pain. Try one of these positions when you lie down:  ? Lie on your back with your knees bent and supported by large pillows. ? Lie on the floor with your legs on the seat of a sofa or chair. ? Lie on your side with your knees and hips bent and a pillow between your legs. ? Lie on your stomach if it does not make pain worse. · Do not sit up in bed. Avoid soft couches and twisted positions. · Avoid bed rest after the first day of back pain. Bed rest can help relieve pain at first, but it delays healing. Continued rest without activity is usually not good for your back. · If you must sit for long periods of time, take breaks from sitting. Change positions every 30 minutes. Get up and walk around, or lie in a comfortable position. Activity  · Take short walks several times a day. You can start with 5 to 10 minutes, 3 or 4 times a day, and work up to longer walks. Walk on level surfaces and avoid hills and stairs until your back starts to feel better. · After your back spasm starts to feel better, try to stretch your muscles every day, especially before and after exercise and at bedtime. Regular stretching can help relax your muscles. · To prevent future back pain, do exercises to stretch and strengthen your back and stomach. Learn to use good posture, safe lifting techniques, and other ways to move to help you avoid back pain. When should you call for help? [id number] anytime you think you may need emergency care. For example, call if:  · You are unable to move an arm or a leg at all. Call your doctor now or seek immediate medical care if:  · You have new or worse symptoms in your legs, belly, or buttocks. Symptoms may include:  ? Numbness or tingling. ? Weakness. ? Pain. · You lose bladder or bowel control.   Watch closely for changes in your health, and be sure to contact your doctor if:  · You have a fever, lose weight, or don't feel well. · You do not get better as expected. Where can you learn more? Go to http://www.gray.com/  Enter E232 in the search box to learn more about \"Back Spasm: Care Instructions. \"  Current as of: March 2, 2020               Content Version: 12.5  © 3498-9130 [employer]. Care instructions adapted under license by [employer] (which disclaims liability or warranty for this information). If you have questions about a medical condition or this instruction, always ask your healthcare professional. [address] any warranty or liability for your use of this information. Safer Sex: Care Instructions  Your Care Instructions  Safer sex is a way to reduce your risk of getting an infection spread through sex. It can also help prevent pregnancy. Most infections that are spread through sex, also called sexually transmitted infections or STIs, can be cured. But some can decrease your chances of getting pregnant if they are not treated early. Others, such as herpes, have no cure. And some, such as HIV, can be deadly. Several products can help you practice safer sex and reduce your chance of STIs. One of the best is a condom. There are condoms for men and for women. The female condom is a tube of soft plastic with a closed end that is placed deep into the vagina. You can use a special rubber sheet (dental dam) for protection during oral sex. Disposable gloves can keep your hands from touching blood, semen, or other body fluids that can carry infections. Remember that birth control methods such as diaphragms, IUDs, foams, and birth control pills do not stop you from getting STIs. Follow-up care is a key part of your treatment and safety. Be sure to make and go to all appointments, and call your doctor if you are having problems. It's also a good idea to know your test results and keep a list of the medicines you take.   How can you care for yourself at home? · Think about getting shots to prevent hepatitis A and hepatitis B. These two diseases can be spread through sex. You also can get hepatitis A if you eat infected food. · Use condoms or female condoms each time and every time you have sex. · Learn the right way to use a male condom:  ? Condoms come in several sizes. Make sure you use the right size. A condom that is too small can break easily. A condom that is too big can slip off during sex. Use a new condom each time you have sex. ? Be careful not to poke a hole in the condom when you open the wrapper. ? Squeeze the tip of the condom to keep out air. ? Pull down the loose skin (foreskin) from the head of an uncircumcised penis. ? While squeezing the tip of the condom, unroll it all the way down to the base of the firm penis. ? Never use petroleum jelly (such as Vaseline), grease, hand lotion, baby oil, or anything with oil in it. These products can make holes in the condom. ? After sex, hold the condom on your penis as you remove your penis from your partner. This will keep semen from spilling out of the condom. · Learn to use a female condom:  ? You can put in a female condom up to 8 hours before sex. ? Squeeze the smaller ring at the closed end and insert it deep into the vagina. The larger ring at the open end should stay outside the vagina. ? During sex, make sure the penis goes into the condom. ? After the penis is removed, close the open end of the condom by twisting it. Remove the condom. · Do not use a female condom and male condom at the same time. · Do not have sex with anyone who has symptoms of an STI, such as sores on the genitals or mouth. The herpes virus that causes cold sores can spread to and from the penis and vagina. · Do not drink a lot of alcohol or use drugs before sex. This can cause you to let down your guard and not practice safer sex.   · Having one sex partner (who does not have STIs and does not have sex with anyone else) is a sure way to avoid STIs. · Talk to your partner before you have sex. Find out if he or she has or is at risk for any STI. Keep in mind that a person may be able to spread an STI even if he or she does not have symptoms. You and your partner may want to get an HIV test. You should get tested again 6 months later. Where can you learn more? Go to http://jelani-daniel.info/  Enter B608 in the search box to learn more about \"Safer Sex: Care Instructions. \"  Current as of: February 26, 2020               Content Version: 12.5  © 9270-7910 [employer]. Care instructions adapted under license by [employer] (which disclaims liability or warranty for this information). If you have questions about a medical condition or this instruction, always ask your healthcare professional. [address] any warranty or liability for your use of this information.

## 2020-07-06 NOTE — PROGRESS NOTES
Tono Kirk is a 29 y.o. female and presents with Exposure to STD (pt states that she would like STD screening); LOW BACK PAIN (pt states that she's been having lower back pains); and Urinary Burning (pt states that it started when she has to strain to void)    Subjective:  Pt presents for concern for STD. Last intercourse ~ 2 weeks ago, unprotected after condom broke. Associated with vaginal discharge, urinary urgency, and dysuria for past few days. White discharge, and vaginal irritation since douching after sex. Also with lower back pain x 1-2 months. Started while bending forward. Tried IBU without relief. Concerned for kidneys also.      Review of Systems  Constitutional: negative for fevers, chills, anorexia and weight loss  Respiratory:  negative for cough, hemoptysis, dyspnea, and wheezing  CV:   negative for chest pain, palpitations, and lower extremity edema  GI:   negative for nausea, vomiting, diarrhea, abdominal pain, and melena  Endo:               negative for polyuria,polydipsia,polyphagia, and heat intolerance  Genitourinary: negative for frequency, urgency, dysuria, retention, and hematuria  Integument:  negative for rash, ulcerations, and pruritus  Hematologic:  negative for easy bruising and bleeding  Musculoskel: negative for muscle weakness,and joint pain/swelling  Neurological:  negative for headaches, dizziness, vertigo,and memory/gait problems  Behavl/Psych: negative for feelings of anxiety, depression, suicide, and mood changes    Past Medical History:   Diagnosis Date    Asthma     Back pain     Bipolar 1 disorder (HCC)     Gastrointestinal disorder     IBS    Other ill-defined conditions(449.89)     Frequent Boils    Scoliosis      Past Surgical History:   Procedure Laterality Date    HX TONSILLECTOMY      HX WISDOM TEETH EXTRACTION       Social History     Socioeconomic History    Marital status: SINGLE     Spouse name: Not on file    Number of children: Not on file    Years of education: Not on file    Highest education level: Not on file   Tobacco Use    Smoking status: Former Smoker     Packs/day: 0.50     Years: 2.00     Pack years: 1.00     Last attempt to quit: 2013     Years since quittin.9    Smokeless tobacco: Never Used    Tobacco comment: denies   Substance and Sexual Activity    Alcohol use: No     Alcohol/week: 0.0 standard drinks    Drug use: No    Sexual activity: Yes     Partners: Male     Birth control/protection: Injection     Family History   Problem Relation Age of Onset    Cancer Neg Hx     Hypertension Neg Hx     Diabetes Neg Hx     Stroke Neg Hx      Current Outpatient Medications   Medication Sig Dispense Refill    methocarbamoL (Robaxin) 500 mg tablet Take 1 Tab by mouth four (4) times daily as needed for Pain (m. spasms). Do NOT Drive, may cause drowsiness 40 Tab 2    ibuprofen (MOTRIN) 800 mg tablet Take 1 Tab by mouth every eight (8) hours as needed for Pain. 20 Tab 0    fluconazole (Diflucan) 150 mg tablet Take 1 Tab by mouth as needed (vaginal discharge and itching). 2 Tab 0    ALPRAZolam (XANAX) 1 mg tablet       QUEtiapine (SEROQUEL) 100 mg tablet TAKE 1 TABLET BY MOUTH AT BEDTIME 30 Tab 1    OXcarbazepine (TRILEPTAL) 300 mg tablet Take 1 Tab by mouth three (3) times daily. 90 Tab 1    cloNIDine HCL (CATAPRES) 0.2 mg tablet       doxycycline (MONODOX) 100 mg capsule       mirtazapine (REMERON) 15 mg tablet       medroxyPROGESTERone (DEPO-PROVERA) 150 mg/mL injection 1 mL by IntraMUSCular route every three (3) months. 1 mL 0    albuterol (PROVENTIL HFA, VENTOLIN HFA, PROAIR HFA) 90 mcg/actuation inhaler Take 1-2 Puffs by inhalation every four (4) hours as needed for Wheezing. 1 Inhaler 0    predniSONE (STERAPRED DS) 10 mg dose pack As directed. HOLD Ibuprofen while taking.  21 Tab 0     Allergies   Allergen Reactions    Naprosyn [Naproxen] Nausea and Vomiting       Objective:  Visit Vitals  /67 (BP 1 Location: Left arm, BP Patient Position: Sitting)   Pulse 91   Temp 98.1 °F (36.7 °C) (Oral)   Resp 17   Ht 5' 5\" (1.651 m)   Wt 150 lb (68 kg)   LMP  (LMP Unknown)   SpO2 97%   BMI 24.96 kg/m²     Wt Readings from Last 3 Encounters:   07/06/20 150 lb (68 kg)   01/30/20 137 lb (62.1 kg)   09/20/19 132 lb (59.9 kg)     Physical Exam:   General appearance - alert, well appearing, and in no distress. Mental status - A/O x 4, normal mood and affect. Neck -Supple ,normal CSP. FROM, non-tender. No significant adenopathy/thyromegaly. No JVD. Chest - CTA. Symmetric chest rise. No wheezing, rales or rhonchi. Heart - Normal rate, regular rhythm. Normal S1, S2. No MGR or clicks. Abdomen - Soft,non-distended. Normoactive BS in all quadrants. NT, no mass or HSM. Ext- Radial, DP pulses, 2+ bilaterally. No pedal edema, clubbing, or cyanosis. Skin-Warm and dry. No hyperpigmentation, ulcerations, or suspicious lesions. Neuro - Normal speech, no focal findings or movement disorder. Normal strength, gait, and muscle tone. Back- alignment midline. Lumbar spinal and paraspinal tenderness. no CVAT. LROM, no SLR. Assessment/Plan:  Labs ordered. Robaxin started. UA neg for LEUK, NITRATES, & Blood. Medication Side Effects and Warnings were discussed with patient: yes   Patient Labs were reviewed: yes  Patient Past Records were reviewed: yes    See below for other orders   Follow-up and Dispositions    · Return in about 4 weeks (around 8/3/2020) for annual with labs and restart depo. ICD-10-CM ICD-9-CM    1. STD exposure Z20.2 V01.6 CT/NG/T.VAGINALIS AMPLIFICATION   2. Urinary urgency R39.15 788.63 AMB POC URINALYSIS DIP STICK AUTO W/ MICRO   3. Lumbar paraspinal muscle spasm M62.830 724.8 methocarbamoL (Robaxin) 500 mg tablet      ibuprofen (MOTRIN) 800 mg tablet   4.  Vaginal discharge N89.8 623.5 fluconazole (Diflucan) 150 mg tablet     Orders Placed This Encounter    CT/NG/T.VAGINALIS AMPLIFICATION     Order Specific Question:   Specimen source     Answer:   Urine [258]    AMB POC URINALYSIS DIP STICK AUTO W/ MICRO    methocarbamoL (Robaxin) 500 mg tablet     Sig: Take 1 Tab by mouth four (4) times daily as needed for Pain (m. spasms). Do NOT Drive, may cause drowsiness     Dispense:  40 Tab     Refill:  2    ibuprofen (MOTRIN) 800 mg tablet     Sig: Take 1 Tab by mouth every eight (8) hours as needed for Pain. Dispense:  20 Tab     Refill:  0    fluconazole (Diflucan) 150 mg tablet     Sig: Take 1 Tab by mouth as needed (vaginal discharge and itching). Dispense:  2 Tab     Refill:  0       Yue Galloway expressed understanding of plan. An After Visit Summary was offered/printed and given to the patient.

## 2020-07-06 NOTE — PROGRESS NOTES
Pt is here for   Chief Complaint   Patient presents with    Exposure to STD     pt states that she would like STD screening    LOW BACK PAIN     pt states that she's been having lower back pains    Urinary Burning     pt states that it started when she has to strain to void     1. Have you been to the ER, urgent care clinic since your last visit? Hospitalized since your last visit? No    2. Have you seen or consulted any other health care providers outside of the [address] [place] [address] since your last visit? Include any pap smears or colon screening.  No     Pt states pain level is a 5/10 lower back pains

## 2020-07-15 LAB
C TRACH RRNA SPEC QL NAA+PROBE: NEGATIVE
N GONORRHOEA RRNA SPEC QL NAA+PROBE: NEGATIVE
T VAGINALIS DNA SPEC QL NAA+PROBE: NEGATIVE

## 2020-11-02 DIAGNOSIS — Z30.42 ENCOUNTER FOR DEPO-PROVERA CONTRACEPTION: ICD-10-CM

## 2020-11-02 DIAGNOSIS — N92.6 IRREGULAR MENSES: ICD-10-CM

## 2020-11-02 RX ORDER — MEDROXYPROGESTERONE ACETATE 150 MG/ML
150 INJECTION, SUSPENSION INTRAMUSCULAR
Qty: 1 ML | Refills: 0 | Status: SHIPPED | OUTPATIENT
Start: 2020-11-02 | End: 2020-11-03 | Stop reason: SDUPTHER

## 2020-11-03 ENCOUNTER — OFFICE VISIT (OUTPATIENT)
Dept: INTERNAL MEDICINE CLINIC | Age: 35
End: 2020-11-03
Payer: COMMERCIAL

## 2020-11-03 VITALS
WEIGHT: 144 LBS | BODY MASS INDEX: 23.99 KG/M2 | SYSTOLIC BLOOD PRESSURE: 95 MMHG | DIASTOLIC BLOOD PRESSURE: 76 MMHG | TEMPERATURE: 99.1 F | RESPIRATION RATE: 16 BRPM | OXYGEN SATURATION: 98 % | HEART RATE: 82 BPM | HEIGHT: 65 IN

## 2020-11-03 DIAGNOSIS — Z11.3 SCREENING EXAMINATION FOR STD (SEXUALLY TRANSMITTED DISEASE): ICD-10-CM

## 2020-11-03 DIAGNOSIS — N76.0 ACUTE VAGINITIS: ICD-10-CM

## 2020-11-03 DIAGNOSIS — Z13.29 SCREENING FOR ENDOCRINE, METABOLIC AND IMMUNITY DISORDER: ICD-10-CM

## 2020-11-03 DIAGNOSIS — R10.30 LOWER ABDOMINAL PAIN: ICD-10-CM

## 2020-11-03 DIAGNOSIS — N92.6 IRREGULAR MENSES: ICD-10-CM

## 2020-11-03 DIAGNOSIS — Z30.42 ENCOUNTER FOR DEPO-PROVERA CONTRACEPTION: ICD-10-CM

## 2020-11-03 DIAGNOSIS — M62.830 LUMBAR PARASPINAL MUSCLE SPASM: ICD-10-CM

## 2020-11-03 DIAGNOSIS — Z13.0 SCREENING FOR ENDOCRINE, METABOLIC AND IMMUNITY DISORDER: ICD-10-CM

## 2020-11-03 DIAGNOSIS — F43.23 ADJUSTMENT REACTION WITH ANXIETY AND DEPRESSION: Primary | ICD-10-CM

## 2020-11-03 DIAGNOSIS — Z13.228 SCREENING FOR ENDOCRINE, METABOLIC AND IMMUNITY DISORDER: ICD-10-CM

## 2020-11-03 DIAGNOSIS — F43.23 ADJUSTMENT REACTION WITH ANXIETY AND DEPRESSION: ICD-10-CM

## 2020-11-03 LAB
BILIRUB UR QL STRIP: NEGATIVE
GLUCOSE UR-MCNC: NEGATIVE MG/DL
HCG URINE, QL. (POC): NEGATIVE
KETONES P FAST UR STRIP-MCNC: NEGATIVE MG/DL
PH UR STRIP: 7 [PH] (ref 4.6–8)
PROT UR QL STRIP: NEGATIVE
SP GR UR STRIP: 1.01 (ref 1–1.03)
UA UROBILINOGEN AMB POC: NORMAL (ref 0.2–1)
URINALYSIS CLARITY POC: CLEAR
URINALYSIS COLOR POC: YELLOW
URINE BLOOD POC: NEGATIVE
URINE LEUKOCYTES POC: NEGATIVE
URINE NITRITES POC: NEGATIVE
VALID INTERNAL CONTROL?: YES

## 2020-11-03 PROCEDURE — 96372 THER/PROPH/DIAG INJ SC/IM: CPT | Performed by: NURSE PRACTITIONER

## 2020-11-03 PROCEDURE — 99214 OFFICE O/P EST MOD 30 MIN: CPT | Performed by: NURSE PRACTITIONER

## 2020-11-03 PROCEDURE — 81025 URINE PREGNANCY TEST: CPT | Performed by: NURSE PRACTITIONER

## 2020-11-03 PROCEDURE — 81003 URINALYSIS AUTO W/O SCOPE: CPT | Performed by: NURSE PRACTITIONER

## 2020-11-03 RX ORDER — MEDROXYPROGESTERONE ACETATE 150 MG/ML
150 INJECTION, SUSPENSION INTRAMUSCULAR
Qty: 1 ML | Refills: 0 | Status: SHIPPED | OUTPATIENT
Start: 2020-11-03 | End: 2021-01-25 | Stop reason: SDUPTHER

## 2020-11-03 RX ORDER — IBUPROFEN 800 MG/1
800 TABLET ORAL
Qty: 60 TAB | Refills: 0 | Status: SHIPPED | OUTPATIENT
Start: 2020-11-03 | End: 2021-08-13 | Stop reason: SDUPTHER

## 2020-11-03 NOTE — PROGRESS NOTES
Chief Complaint   Patient presents with    Follow-up     pt states she is still having abdominal pain     Depo       1. Have you been to the ER, urgent care clinic since your last visit? Hospitalized since your last visit? No    2. Have you seen or consulted any other health care providers outside of the [address] [place] [address] since your last visit? Include any pap smears or colon screening. No    Date last pap: 05/03/2018. Last Depo-Provera: 01/30/2020. Side Effects if any: none. Serum HCG indicated? no.  Depo-Provera 150 mg IM given by: [facility]. Next appointment due 01/19/2020-02/02/2020.

## 2020-11-03 NOTE — PROGRESS NOTES
Subjective: (As above and below)     Chief Complaint   Patient presents with    Follow-up     pt states she is still having abdominal pain     Deion Goldstein is a 28y.o. year old female who presents for     Depo injection    Lower abd pain: chronic intermittent, feels cramping at times. She reports some urinary frequency and recurrent BV although no acute symptoms at this time  Denies diarrhea constipation, nausea or vomiting      Head injury: she hit her head on a car door 3 days ago and got a huge knot which is reducing but . She did not lose consciousness. She denies dizziness, vision changes, no problems w/ memory    Depression and anxiety: followed by psychiatry reports doing well    LBP: intermittent, no new problems since last seen by different NP, ibup helps  She is not using extra nsaids      Reviewed PmHx, RxHx, FmHx, SocHx, AllgHx and updated in chart.   Family History   Problem Relation Age of Onset    Cancer Neg Hx     Hypertension Neg Hx     Diabetes Neg Hx     Stroke Neg Hx        Past Medical History:   Diagnosis Date    Asthma     Back pain     Bipolar 1 disorder (HCC)     Gastrointestinal disorder     IBS    Other ill-defined conditions(819.89)     Frequent Boils    Scoliosis       Social History     Socioeconomic History    Marital status: SINGLE     Spouse name: Not on file    Number of children: Not on file    Years of education: Not on file    Highest education level: Not on file   Tobacco Use    Smoking status: Former Smoker     Packs/day: 0.50     Years: 2.00     Pack years: 1.00     Last attempt to quit: 2013     Years since quittin.2    Smokeless tobacco: Never Used    Tobacco comment: denies   Substance and Sexual Activity    Alcohol use: No     Alcohol/week: 0.0 standard drinks    Drug use: No    Sexual activity: Yes     Partners: Male     Birth control/protection: Injection          Current Outpatient Medications   Medication Sig    ibuprofen (MOTRIN) 800 mg tablet Take 1 Tab by mouth every eight (8) hours as needed for Pain. With food    medroxyPROGESTERone (DEPO-PROVERA) 150 mg/mL injection 1 mL by IntraMUSCular route every three (3) months.  ALPRAZolam (XANAX) 1 mg tablet     albuterol (PROVENTIL HFA, VENTOLIN HFA, PROAIR HFA) 90 mcg/actuation inhaler Take 1-2 Puffs by inhalation every four (4) hours as needed for Wheezing.  QUEtiapine (SEROQUEL) 100 mg tablet TAKE 1 TABLET BY MOUTH AT BEDTIME    OXcarbazepine (TRILEPTAL) 300 mg tablet Take 1 Tab by mouth three (3) times daily.  fluconazole (Diflucan) 150 mg tablet Take 1 Tab by mouth as needed (vaginal discharge and itching). No current facility-administered medications for this visit. Review of Systems:   Constitutional:    Negative for fever and chills, negative diaphoresis. HEENT:              Negative for neck pain and stiffness. Eyes:                  Negative for visual disturbance, itching, redness or discharge. Respiratory:        Negative for cough and shortness of breath. Cardiovascular:  Negative for chest pain and palpitations. Gastrointestinal: Negative for nausea, vomiting, diarrhea or constipation. +abd cramping  Genitourinary:     Negative for dysuria and frequency. Musculoskeletal: Negative for falls, tenderness and swelling. +LBP  Skin:                    Negative for rash, masses or lesions. Neurological:       Negative for dizzyness, seizure, loss of consciousness, weakness and numbness.      Objective:     Vitals:    11/03/20 0836   BP: 95/76   Pulse: 82   Resp: 16   Temp: 99.1 °F (37.3 °C)   TempSrc: Temporal   SpO2: 98%   Weight: 144 lb (65.3 kg)   Height: 5' 5\" (1.651 m)       Results for orders placed or performed in visit on 11/03/20   AMB POC URINE PREGNANCY TEST, VISUAL COLOR COMPARISON   Result Value Ref Range    VALID INTERNAL CONTROL POC Yes     HCG urine, Ql. (POC) Negative Negative         Gen: Oriented to person, place and time and well-developed, well-nourished and in no distress. HEENT:    Head: normocephalic and atraumatic. Eyes:  EOM are normal. Pupils equal and round. Neck:  Normal range of motion. Neck supple. Cardiovascular: normal rate, regular rhythm and normal heart sounds. Pulmonary/Chest:  Effort normal and breath sounds normal.  No respiratory distress. No wheezes, no rales. Abdominal: soft, normal  bowel sounds. Musculoskeletal:  No edema, no tenderness. No calf tenderness or edema. Neurological:  Alert, oriented to person, place and time. Skin: skin is warm and dry. - she has a small superficial tender bump to forehead        Assessment/ Plan:       1. Lumbar paraspinal muscle spasm    - ibuprofen (MOTRIN) 800 mg tablet; Take 1 Tab by mouth every eight (8) hours as needed for Pain. With food  Dispense: 60 Tab; Refill: 0      2. Encounter for Depo-Provera contraception    - AMB POC URINE PREGNANCY TEST, VISUAL COLOR COMPARISON  - MN MEDROXYPROGESTERONE ACETATE, 1MG  - MN THER/PROPH/DIAG INJECTION, SUBCUT/IM  - medroxyPROGESTERone (DEPO-PROVERA) 150 mg/mL injection; 1 mL by IntraMUSCular route every three (3) months. Dispense: 1 mL; Refill: 0      3. Irregular menses    - medroxyPROGESTERone (DEPO-PROVERA) 150 mg/mL injection; 1 mL by IntraMUSCular route every three (3) months. Dispense: 1 mL; Refill: 0  - CBC W/O DIFF  - METABOLIC PANEL, COMPREHENSIVE      4. Adjustment reaction with anxiety and depression    - CBC W/O DIFF  - METABOLIC PANEL, COMPREHENSIVE  - LIPID PANEL  - VITAMIN D, 25 HYDROXY; Future      5.  Acute vaginitis    - NUSWAB VAGINITIS PLUS    6. Screening examination for STD (sexually transmitted disease)    - HIV 1/2 AG/AB, 4TH GENERATION,W RFLX CONFIRM  - HEPATITIS C QT BY PCR WITH REFLEX GENOTYPE  - T PALLIDUM SCREEN W/REFLEX    7. Screening for endocrine, metabolic and immunity disorder    - CBC W/O DIFF  - METABOLIC PANEL, COMPREHENSIVE  - LIPID PANEL  - VITAMIN D, 25 HYDROXY; Future      8. Lower abdominal pain  ?nuswab  Fu INI  - AMB POC URINALYSIS DIP STICK AUTO W/O MICRO        I have discussed the diagnosis with the patient and the intended plan as seen in the above orders. The patient has received an after-visit summary and questions were answered concerning future plans. Pt conveyed understanding of plan. Medication Side Effects and Warnings were discussed with patient: yes  Patient Labs were reviewed: yes  Patient Past Records were reviewed:  yes    Elisabeth Corrales.  Osmar Mao, NADER

## 2020-11-05 LAB
25(OH)D3+25(OH)D2 SERPL-MCNC: 14.3 NG/ML (ref 30–100)
A VAGINAE DNA VAG QL NAA+PROBE: ABNORMAL SCORE
ALBUMIN SERPL-MCNC: 4.4 G/DL (ref 3.8–4.8)
ALBUMIN/GLOB SERPL: 1.9 {RATIO} (ref 1.2–2.2)
ALP SERPL-CCNC: 63 IU/L (ref 39–117)
ALT SERPL-CCNC: <5 IU/L (ref 0–32)
AST SERPL-CCNC: 11 IU/L (ref 0–40)
BILIRUB SERPL-MCNC: 0.4 MG/DL (ref 0–1.2)
BUN SERPL-MCNC: 7 MG/DL (ref 6–20)
BUN/CREAT SERPL: 7 (ref 9–23)
BVAB2 DNA VAG QL NAA+PROBE: ABNORMAL SCORE
C ALBICANS DNA VAG QL NAA+PROBE: NEGATIVE
C GLABRATA DNA VAG QL NAA+PROBE: NEGATIVE
C TRACH DNA VAG QL NAA+PROBE: NEGATIVE
CALCIUM SERPL-MCNC: 9 MG/DL (ref 8.7–10.2)
CHLORIDE SERPL-SCNC: 105 MMOL/L (ref 96–106)
CHOLEST SERPL-MCNC: 187 MG/DL (ref 100–199)
CO2 SERPL-SCNC: 23 MMOL/L (ref 20–29)
CREAT SERPL-MCNC: 0.96 MG/DL (ref 0.57–1)
ERYTHROCYTE [DISTWIDTH] IN BLOOD BY AUTOMATED COUNT: 12.2 % (ref 11.7–15.4)
GLOBULIN SER CALC-MCNC: 2.3 G/DL (ref 1.5–4.5)
GLUCOSE SERPL-MCNC: 84 MG/DL (ref 65–99)
HCT VFR BLD AUTO: 39.3 % (ref 34–46.6)
HCV GENOTYPE: NORMAL
HCV RNA SERPL NAA+PROBE-ACNC: NORMAL IU/ML
HCV RNA SERPL NAA+PROBE-LOG IU: NORMAL LOG10 IU/ML
HDLC SERPL-MCNC: 75 MG/DL
HGB BLD-MCNC: 13.5 G/DL (ref 11.1–15.9)
HIV 1+2 AB+HIV1 P24 AG SERPL QL IA: NON REACTIVE
INTERPRETATION, 910389: NORMAL
LDLC SERPL CALC-MCNC: 101 MG/DL (ref 0–99)
MCH RBC QN AUTO: 30.6 PG (ref 26.6–33)
MCHC RBC AUTO-ENTMCNC: 34.4 G/DL (ref 31.5–35.7)
MCV RBC AUTO: 89 FL (ref 79–97)
MEGA1 DNA VAG QL NAA+PROBE: ABNORMAL SCORE
N GONORRHOEA DNA VAG QL NAA+PROBE: NEGATIVE
PLATELET # BLD AUTO: 226 X10E3/UL (ref 150–450)
POTASSIUM SERPL-SCNC: 4.5 MMOL/L (ref 3.5–5.2)
PROT SERPL-MCNC: 6.7 G/DL (ref 6–8.5)
RBC # BLD AUTO: 4.41 X10E6/UL (ref 3.77–5.28)
SODIUM SERPL-SCNC: 140 MMOL/L (ref 134–144)
T VAGINALIS DNA VAG QL NAA+PROBE: NEGATIVE
TEST INFORMATION: NORMAL
TREPONEMA PALLIDUM IGG+IGM AB [PRESENCE] IN SERUM OR PLASMA BY IMMUNOASSAY: NON REACTIVE
TRIGL SERPL-MCNC: 55 MG/DL (ref 0–149)
VLDLC SERPL CALC-MCNC: 11 MG/DL (ref 5–40)
WBC # BLD AUTO: 4.1 X10E3/UL (ref 3.4–10.8)

## 2020-11-05 RX ORDER — ERGOCALCIFEROL 1.25 MG/1
50000 CAPSULE ORAL
Qty: 8 CAP | Refills: 0 | Status: SHIPPED | OUTPATIENT
Start: 2020-11-05 | End: 2021-08-13 | Stop reason: SDUPTHER

## 2020-11-09 ENCOUNTER — TELEPHONE (OUTPATIENT)
Dept: INTERNAL MEDICINE CLINIC | Age: 35
End: 2020-11-09

## 2020-11-09 RX ORDER — METRONIDAZOLE 7.5 MG/G
1 GEL VAGINAL
Qty: 25 G | Refills: 0 | Status: SHIPPED | OUTPATIENT
Start: 2020-11-09 | End: 2020-11-14

## 2020-11-09 NOTE — TELEPHONE ENCOUNTER
Pt states she would not like results mailed to her, pt states result letter was mailed after she requested to have a phone call. Pt was informed of medication sent today and would like to lab results.

## 2021-02-09 ENCOUNTER — CLINICAL SUPPORT (OUTPATIENT)
Dept: INTERNAL MEDICINE CLINIC | Age: 36
End: 2021-02-09
Payer: COMMERCIAL

## 2021-02-09 VITALS
HEIGHT: 65 IN | SYSTOLIC BLOOD PRESSURE: 108 MMHG | TEMPERATURE: 98.7 F | OXYGEN SATURATION: 97 % | BODY MASS INDEX: 24.49 KG/M2 | HEART RATE: 95 BPM | RESPIRATION RATE: 16 BRPM | DIASTOLIC BLOOD PRESSURE: 77 MMHG | WEIGHT: 147 LBS

## 2021-02-09 DIAGNOSIS — Z30.42 ENCOUNTER FOR DEPO-PROVERA CONTRACEPTION: Primary | ICD-10-CM

## 2021-02-09 PROCEDURE — 96372 THER/PROPH/DIAG INJ SC/IM: CPT | Performed by: NURSE PRACTITIONER

## 2021-02-09 NOTE — PROGRESS NOTES
Subjective:      Aleks Marie is here for her depoprovera injection. Patient wishes to continue depoprovera treatment for contraception. Side effects of treatment to date: none. Standing order is on patient's medication list.    Last Depoprovera injection date: 11/03/2020  Last Pap smear date: 05/03/2018    Objective:     Visit Vitals  Ht 5' 5\" (1.651 m)   BMI 23.96 kg/m²       Assessment/Plan:     Stable, doing well on Depoprovera, appropriate to continue. Depoprovera 150 mg IM given. She tolerated the injection well, see Immunization activity for details. Agustina Weaver     current treatment plan is effective, no change in therapy.

## 2021-05-26 ENCOUNTER — CLINICAL SUPPORT (OUTPATIENT)
Dept: INTERNAL MEDICINE CLINIC | Age: 36
End: 2021-05-26
Payer: COMMERCIAL

## 2021-05-26 DIAGNOSIS — N89.8 VAGINAL DISCHARGE: ICD-10-CM

## 2021-05-26 DIAGNOSIS — Z30.42 ENCOUNTER FOR DEPO-PROVERA CONTRACEPTION: Primary | ICD-10-CM

## 2021-05-26 PROCEDURE — 96372 THER/PROPH/DIAG INJ SC/IM: CPT | Performed by: NURSE PRACTITIONER

## 2021-05-26 RX ORDER — FLUCONAZOLE 150 MG/1
150 TABLET ORAL AS NEEDED
Qty: 2 TABLET | Refills: 0 | Status: SHIPPED | OUTPATIENT
Start: 2021-05-26 | End: 2021-05-26 | Stop reason: SDUPTHER

## 2021-05-26 RX ORDER — FLUCONAZOLE 150 MG/1
150 TABLET ORAL AS NEEDED
Qty: 2 TABLET | Refills: 0 | Status: SHIPPED | OUTPATIENT
Start: 2021-05-26 | End: 2022-07-22

## 2021-05-26 NOTE — PROGRESS NOTES
Date last pap:.5/13/2018  Last Depo-Provera: 2/9/2021  Side Effects if any:NONE. Serum HCG indicated? N/A. Depo-Provera 150 mg IM given by: Megan Garcia LPN in left deltoid IM.  Pt tolerated well  Next appointment due 8/11/21through 8/25/221

## 2021-08-13 ENCOUNTER — OFFICE VISIT (OUTPATIENT)
Dept: INTERNAL MEDICINE CLINIC | Age: 36
End: 2021-08-13
Payer: COMMERCIAL

## 2021-08-13 VITALS
WEIGHT: 150 LBS | DIASTOLIC BLOOD PRESSURE: 79 MMHG | BODY MASS INDEX: 24.99 KG/M2 | RESPIRATION RATE: 18 BRPM | TEMPERATURE: 96.9 F | HEART RATE: 76 BPM | OXYGEN SATURATION: 98 % | SYSTOLIC BLOOD PRESSURE: 101 MMHG | HEIGHT: 65 IN

## 2021-08-13 DIAGNOSIS — G89.29 CHRONIC DENTAL PAIN: ICD-10-CM

## 2021-08-13 DIAGNOSIS — Z13.21 SCREENING FOR ENDOCRINE, NUTRITIONAL, METABOLIC AND IMMUNITY DISORDER: ICD-10-CM

## 2021-08-13 DIAGNOSIS — Z13.0 SCREENING FOR ENDOCRINE, NUTRITIONAL, METABOLIC AND IMMUNITY DISORDER: ICD-10-CM

## 2021-08-13 DIAGNOSIS — Z00.00 ADULT GENERAL MEDICAL EXAMINATION: Primary | ICD-10-CM

## 2021-08-13 DIAGNOSIS — Z13.228 SCREENING FOR ENDOCRINE, NUTRITIONAL, METABOLIC AND IMMUNITY DISORDER: ICD-10-CM

## 2021-08-13 DIAGNOSIS — Z13.220 SCREENING FOR LIPID DISORDERS: ICD-10-CM

## 2021-08-13 DIAGNOSIS — E55.9 VITAMIN D DEFICIENCY: ICD-10-CM

## 2021-08-13 DIAGNOSIS — M62.830 LUMBAR PARASPINAL MUSCLE SPASM: ICD-10-CM

## 2021-08-13 DIAGNOSIS — Z13.29 SCREENING FOR ENDOCRINE, NUTRITIONAL, METABOLIC AND IMMUNITY DISORDER: ICD-10-CM

## 2021-08-13 DIAGNOSIS — K08.9 CHRONIC DENTAL PAIN: ICD-10-CM

## 2021-08-13 PROCEDURE — 99395 PREV VISIT EST AGE 18-39: CPT | Performed by: NURSE PRACTITIONER

## 2021-08-13 RX ORDER — ESCITALOPRAM OXALATE 10 MG/1
TABLET ORAL
COMMUNITY
Start: 2021-07-15 | End: 2022-07-22

## 2021-08-13 RX ORDER — IBUPROFEN 800 MG/1
800 TABLET ORAL
Qty: 60 TABLET | Refills: 0 | OUTPATIENT
Start: 2021-08-13 | End: 2021-12-29

## 2021-08-13 RX ORDER — TRAZODONE HYDROCHLORIDE 50 MG/1
TABLET ORAL
COMMUNITY
Start: 2021-07-15 | End: 2022-07-22

## 2021-08-13 RX ORDER — MIRTAZAPINE 7.5 MG/1
TABLET, FILM COATED ORAL
COMMUNITY
Start: 2021-06-15 | End: 2022-07-22

## 2021-08-13 RX ORDER — QUETIAPINE FUMARATE 100 MG/1
TABLET, FILM COATED ORAL
Qty: 30 TABLET | Refills: 1 | Status: CANCELLED | OUTPATIENT
Start: 2021-08-13

## 2021-08-13 RX ORDER — ERGOCALCIFEROL 1.25 MG/1
50000 CAPSULE ORAL
Qty: 8 CAPSULE | Refills: 0 | Status: SHIPPED | OUTPATIENT
Start: 2021-08-13 | End: 2021-10-08

## 2021-08-13 NOTE — PROGRESS NOTES
Jose Johnson is a 39 y.o. female presenting for annual checkup. Specific concerns today: having RIGHT upper dental pain with upcoming dentist appt on 8/28. USING tissue in ear with some pain relief. Having pelvic bone tenderness and headaches with increased appetite. She feels is due to DEPO use, however tylenol helps and not planning to stop. Recently [place] into [place] neighborhood and more anxious since. Planning to move. ROS: Feeling well. No dyspnea or chest pain on exertion. No abdominal pain, change in bowel habits, black or bloody stools. Last BM: today, [place]#5. Averages 7 BMs every 7 days. Averages drinking 2 bottles of water daily. No urinary tract or gynecologic/prostatic symptoms. No neurological complaints.      Review of Systems  Constitutional: negative for fevers, chills, anorexia and weight loss  Eyes:   negative for visual disturbance, drainage, and irritation  ENT:   negative for tinnitus,sore throat,nasal congestion,ear pain,and hoarseness  Respiratory:  negative for cough, hemoptysis, dyspnea, and wheezing  CV:   negative for chest pain, palpitations, and lower extremity edema  GI:   negative for nausea, vomiting, diarrhea, and melena  Endo:               negative for polyuria,polydipsia,polyphagia, and heat intolerance  Genitourinary: negative for frequency, urgency, dysuria, retention, and hematuria  Integument:  negative for rash, ulcerations, and pruritus  Hematologic:  negative for easy bruising and bleeding  Musculoskel: negative for arthralgias, muscle weakness,and joint pain/swelling  Neurological:  negative for  dizziness, vertigo,and memory/gait problems  Behavl/Psych: negative for feelings of anxiety, depression, suicide, and mood changes    Dental exam in past 12 months: yes, will see on 8/28  Eye exam in past 12-24 months: no    Past Medical History:   Diagnosis Date    Asthma     Back pain     Bipolar 1 disorder ([facility] [address]     Gastrointestinal disorder     IBS  Other ill-defined conditions(799.89)     Frequent Boils    Scoliosis      Past Surgical History:   Procedure Laterality Date    HX TONSILLECTOMY      HX WISDOM TEETH EXTRACTION       Social History     Socioeconomic History    Marital status: SINGLE     Spouse name: Not on file    Number of children: Not on file    Years of education: Not on file    Highest education level: Not on file   Tobacco Use    Smoking status: Former Smoker     Packs/day: 0.50     Years: 2.00     Pack years: 1.00     Quit date: 2013     Years since quittin.0    Smokeless tobacco: Never Used    Tobacco comment: denies   Substance and Sexual Activity    Alcohol use: No     Alcohol/week: 0.0 standard drinks    Drug use: No    Sexual activity: Yes     Partners: Male     Birth control/protection: Injection     Social Determinants of Health     Financial Resource Strain:     Difficulty of Paying Living Expenses:    Food Insecurity:     Worried About Running Out of Food in the Last Year:     Ran Out of Food in the Last Year:    Transportation Needs:     Lack of Transportation (Medical):  Lack of Transportation (Non-Medical):    Physical Activity:     Days of Exercise per Week:     Minutes of Exercise per Session:    Stress:     Feeling of Stress :    Social Connections:     Frequency of Communication with Friends and Family:     Frequency of Social Gatherings with Friends and Family:     Attends Rastafarian Services:     Active Member of Clubs or Organizations:     Attends Club or Organization Meetings:     Marital Status:      Family History   Problem Relation Age of Onset    Cancer Neg Hx     Hypertension Neg Hx     Diabetes Neg Hx     Stroke Neg Hx      Current Outpatient Medications   Medication Sig Dispense Refill    ergocalciferol (ERGOCALCIFEROL) 1,250 mcg (50,000 unit) capsule Take 1 Capsule by mouth every seven (7) days.  8 Capsule 0    ibuprofen (MOTRIN) 800 mg tablet Take 1 Tablet by mouth every eight (8) hours as needed for Pain. With food 60 Tablet 0    medroxyPROGESTERone (DEPO-PROVERA) 150 mg/mL syrg INJECT 1 ML BY INTRAMUSCULAR ROUTE EVERY THREE (3) MONTHS. 1 Syringe 2    ALPRAZolam (XANAX) 1 mg tablet       albuterol (PROVENTIL HFA, VENTOLIN HFA, PROAIR HFA) 90 mcg/actuation inhaler Take 1-2 Puffs by inhalation every four (4) hours as needed for Wheezing. 1 Inhaler 0    QUEtiapine (SEROQUEL) 100 mg tablet TAKE 1 TABLET BY MOUTH AT BEDTIME 30 Tab 1    OXcarbazepine (TRILEPTAL) 300 mg tablet Take 1 Tab by mouth three (3) times daily. 90 Tab 1    escitalopram oxalate (LEXAPRO) 10 mg tablet TAKE 1 TABLET BY MOUTH EVERY DAY (Patient not taking: Reported on 8/13/2021)      mirtazapine (REMERON) 7.5 mg tablet TAKE 1 TABLET BY MOUTH EVERY DAY AT NIGHT (Patient not taking: Reported on 8/13/2021)      traZODone (DESYREL) 50 mg tablet TAKE 1 TABLET BY MOUTH EVERY DAY AT BEDTIME AS NEEDED (Patient not taking: Reported on 8/13/2021)      fluconazole (Diflucan) 150 mg tablet Take 1 Tablet by mouth as needed (vaginal discharge and itching). (Patient not taking: Reported on 8/13/2021) 2 Tablet 0     Allergies   Allergen Reactions    Naprosyn [Naproxen] Nausea and Vomiting       Objective:  Visit Vitals  /79 (BP 1 Location: Left upper arm, BP Patient Position: Sitting, BP Cuff Size: Large adult)   Pulse 76   Temp 96.9 °F (36.1 °C) (Temporal)   Resp 18   Ht 5' 5\" (1.651 m)   Wt 150 lb (68 kg)   LMP  (Exact Date)   SpO2 98%   BMI 24.96 kg/m²     Wt Readings from Last 3 Encounters:   08/13/21 150 lb (68 kg)   02/09/21 147 lb (66.7 kg)   11/03/20 144 lb (65.3 kg)     Physical Exam:   General appearance - alert, well appearing, and in no distress. Poor dentition. Mental status - A/O x 4, normal mood and affect. Head/Eyes- AT/NC. DEE, EOMI, corneas normal, no foreign bodies. Ears- TM intact bilaterally, no erythema or drainage. Nose- Septum midline, pink mucosa.  Turbinates normal, no polyps or erythema. No sinus tenderness. Mouth/Throat - mucous membranes moist, pharynx normal without lesions. No tonsillar swelling or exudates. Neck -Supple ,normal CSP. FROM, non-tender. No adenopathy/thyromegaly. No JVD. Chest - CTA. Symmetric chest rise. No wheezing, rales or rhonchi. Heart - Normal rate, regular rhythm. Normal S1, S2. No MGR. Abdomen - Soft,non-distended. Normoactive BS in all quadrants. NT, no mass, rebound, or HSM   Ext- Radial, DP pulses, 2+ bilaterally. No pedal edema, clubbing, or cyanosis. Skin- Normal for ethnicity, warm, and dry. No hyperpigmentation, ulcerations, or suspicious lesions  Neuro - Normal speech, no focal findings. Normal strength and muscle tone. Coordination and gait normal.      Assessment/Plan:  Labs ordered. Deferred dental concern to dentist, use of ambesol or clove oil reviewed. Advised pt she may hold or continue DEPO, her choice. If pain persist and continues with DEPO therapy beyond 2-5 cons years, may get DEXA scan to screen for osteoporosis/osteopenia. Medication Side Effects and Warnings were discussed with patient: yes   Patient Labs were reviewed: yes  Patient Past Records were reviewed: yes  See orders below  Follow-up and Dispositions    · Return in about 6 months (around 2/13/2022) for PAP. ICD-10-CM ICD-9-CM    1. Adult general medical examination  Z00.00 V70.9 AMB POC URINALYSIS DIP STICK AUTO W/ MICRO      METABOLIC PANEL, BASIC      CBC W/O DIFF      LIPID PANEL   2. Lumbar paraspinal muscle spasm  M62.830 724.8 ibuprofen (MOTRIN) 800 mg tablet   3. Screening for endocrine, nutritional, metabolic and immunity disorder  Z13.29 V77.99 AMB POC URINALYSIS DIP STICK AUTO W/ MICRO    S89.23  METABOLIC PANEL, BASIC    F64.053  CBC W/O DIFF    Z13.0     4. Screening for lipid disorders  Z13.220 V77.91 LIPID PANEL   5. Chronic dental pain  K08.9 525.9     G89.29 338.29    6.  Vitamin D deficiency  E55.9 268.9 VITAMIN D, 25 HYDROXY     Orders Placed This Encounter    METABOLIC PANEL, BASIC     Standing Status:   Future     Standing Expiration Date:   2/13/2022    CBC W/O DIFF     Standing Status:   Future     Standing Expiration Date:   2/13/2022    LIPID PANEL     Standing Status:   Future     Standing Expiration Date:   2/13/2022    VITAMIN D, 25 HYDROXY     Standing Status:   Future     Standing Expiration Date:   9/13/2021    AMB POC URINALYSIS DIP STICK AUTO W/ MICRO     Standing Status:   Future     Standing Expiration Date:   9/13/2021    escitalopram oxalate (LEXAPRO) 10 mg tablet     Sig: TAKE 1 TABLET BY MOUTH EVERY DAY    mirtazapine (REMERON) 7.5 mg tablet     Sig: TAKE 1 TABLET BY MOUTH EVERY DAY AT NIGHT    traZODone (DESYREL) 50 mg tablet     Sig: TAKE 1 TABLET BY MOUTH EVERY DAY AT BEDTIME AS NEEDED    ergocalciferol (ERGOCALCIFEROL) 1,250 mcg (50,000 unit) capsule     Sig: Take 1 Capsule by mouth every seven (7) days. Dispense:  8 Capsule     Refill:  0    ibuprofen (MOTRIN) 800 mg tablet     Sig: Take 1 Tablet by mouth every eight (8) hours as needed for Pain. With food     Dispense:  60 Tablet     Refill:  0         Yue Galloway expressed understanding of plan. An After Visit Summary was offered/printed and given to the patient.

## 2021-08-13 NOTE — PATIENT INSTRUCTIONS
Eat soft food, gargle salt/peroxide with water and spit, avoid extremes in food/drink temperatures, and call dentist.          Learning About Vitamin D  Why is it important to get enough vitamin D? Your body needs vitamin D to absorb calcium. Calcium keeps your bones and muscles, including your heart, healthy and strong. If your muscles don't get enough calcium, they can cramp, hurt, or feel weak. You may have long-term (chronic) muscle aches and pains. If you don't get enough vitamin D throughout life, you have an increased chance of having thin and brittle bones (osteoporosis) in your later years. Children who don't get enough vitamin D may not grow as much as others their age. They also have a chance of getting a rare disease called rickets. It causes weak bones. Vitamin D and calcium are added to many foods. And your body uses sunshine to make its own vitamin D. How much vitamin D do you need? The recommended daily allowance (RDA) for vitamin D is 600 IU (international units) every day for people ages 3 through 79. Adults 71 and older need 800 IU every day. Blood tests for vitamin D can check your vitamin D level. But there is no standard normal range used by all laboratories. You're likely getting enough vitamin D if your levels are in the range of 20 to 50 ng/mL. How can you get more vitamin D? Foods that contain vitamin D include:  · [place], tuna, and mackerel. These are some of the best foods to eat when you need to get more vitamin D.  · Cheese, egg yolks, and beef liver. These foods have vitamin D in small amounts. · Milk, soy drinks, orange juice, yogurt, margarine, and some kinds of cereal have vitamin D added to them. Some people don't make vitamin D as well as others. They may have to take extra care in getting enough vitamin D. Things that reduce how much vitamin D your body makes include:  · Dark skin, such as many  Americans have.   · Age, especially if you are older than 72.  · Digestive problems, such as Crohn's or celiac disease. · Liver and kidney disease. Some people who do not get enough vitamin D may need supplements. Are there any risks from taking vitamin D?  · Too much vitamin D:  ? Can damage your kidneys. ? Can cause nausea and vomiting, constipation, and weakness. ? Raises the amount of calcium in your blood. If this happens, you can get confused or have an irregular heart rhythm. · Vitamin D may interact with other medicines. Tell your doctor about all of the medicines you take, including over-the-counter drugs, herbs, and pills. Tell your doctor about all of your current medical problems. Where can you learn more? Go to http://www.arreaga.com/  Enter V530 in the search box to learn more about \"Learning About Vitamin D.\"  Current as of: December 17, 2020               Content Version: 12.8  © 9087-1746 [employer]. Care instructions adapted under license by [employer] (which disclaims liability or warranty for this information). If you have questions about a medical condition or this instruction, always ask your healthcare professional. [address] any warranty or liability for your use of this information.

## 2021-08-13 NOTE — PROGRESS NOTES
Pt is here for   Chief Complaint   Patient presents with    Annual Exam     with labs     Dental Pain     right sided, top      1. Have you been to the ER, urgent care clinic since your last visit? Hospitalized since your last visit? No    2. Have you seen or consulted any other health care providers outside of the [address] [place] [address] since your last visit? Include any pap smears or colon screening.  No    States pain level is a 7/10 tooth

## 2021-08-16 LAB
BILIRUB UR QL STRIP: NEGATIVE
GLUCOSE UR-MCNC: NEGATIVE MG/DL
KETONES P FAST UR STRIP-MCNC: NEGATIVE MG/DL
PH UR STRIP: 6.5 [PH] (ref 4.6–8)
PROT UR QL STRIP: NEGATIVE
SP GR UR STRIP: 1.02 (ref 1–1.03)
UA UROBILINOGEN AMB POC: NORMAL (ref 0.2–1)
URINALYSIS CLARITY POC: CLEAR
URINALYSIS COLOR POC: YELLOW
URINE BLOOD POC: NEGATIVE
URINE LEUKOCYTES POC: NEGATIVE
URINE NITRITES POC: NEGATIVE

## 2021-08-16 PROCEDURE — 81003 URINALYSIS AUTO W/O SCOPE: CPT | Performed by: NURSE PRACTITIONER

## 2021-09-03 ENCOUNTER — CLINICAL SUPPORT (OUTPATIENT)
Dept: INTERNAL MEDICINE CLINIC | Age: 36
End: 2021-09-03
Payer: COMMERCIAL

## 2021-09-03 VITALS
HEART RATE: 77 BPM | RESPIRATION RATE: 17 BRPM | OXYGEN SATURATION: 97 % | HEIGHT: 65 IN | WEIGHT: 152 LBS | BODY MASS INDEX: 25.33 KG/M2 | SYSTOLIC BLOOD PRESSURE: 121 MMHG | TEMPERATURE: 98.3 F | DIASTOLIC BLOOD PRESSURE: 77 MMHG

## 2021-09-03 DIAGNOSIS — Z30.42 ENCOUNTER FOR DEPO-PROVERA CONTRACEPTION: Primary | ICD-10-CM

## 2021-09-03 PROCEDURE — 96372 THER/PROPH/DIAG INJ SC/IM: CPT | Performed by: NURSE PRACTITIONER

## 2021-09-03 NOTE — PROGRESS NOTES
Pt is here for   Subjective:      Emeli Bland is here for her depoprovera injection. Patient wishes to continue depoprovera treatment for contraception. Side effects of treatment to date:NONE. Standing order is on patient's medication list.    Last Depoprovera injection date: 5/26/2021  Last Pap smear date: 05/03/2018    Objective: There were no vitals taken for this visit. Assessment/Plan:     Stable, doing well on Depoprovera, appropriate to continue. Depoprovera 150 mg IM given. She tolerated the injection well,Given in the left Glute. No reaction noted    Renny Sahu LPN     Expected to return in 12 weeks for Depo, between 11/19/2021 to 12/3/2021. Depo Calendar given.

## 2021-09-21 ENCOUNTER — HOSPITAL ENCOUNTER (EMERGENCY)
Age: 36
Discharge: HOME | End: 2021-09-21
Attending: EMERGENCY MEDICINE
Payer: COMMERCIAL

## 2021-09-21 VITALS
OXYGEN SATURATION: 99 % | TEMPERATURE: 98 F | RESPIRATION RATE: 20 BRPM | SYSTOLIC BLOOD PRESSURE: 110 MMHG | DIASTOLIC BLOOD PRESSURE: 76 MMHG | HEIGHT: 65 IN | HEART RATE: 65 BPM | WEIGHT: 163 LBS | BODY MASS INDEX: 27.16 KG/M2

## 2021-09-21 DIAGNOSIS — Z11.52 ENCOUNTER FOR SCREENING FOR COVID-19: Primary | ICD-10-CM

## 2021-09-21 DIAGNOSIS — R05.9 COUGH: ICD-10-CM

## 2021-09-21 DIAGNOSIS — F17.200 TOBACCO DEPENDENCE: ICD-10-CM

## 2021-09-21 PROCEDURE — 99282 EMERGENCY DEPT VISIT SF MDM: CPT

## 2021-09-21 PROCEDURE — U0005 INFEC AGEN DETEC AMPLI PROBE: HCPCS

## 2021-09-21 NOTE — ED PROVIDER NOTES
EMERGENCY DEPARTMENT HISTORY AND PHYSICAL EXAM      Date: 9/21/2021  Patient Name: Epi Atkins    History of Presenting Illness     Chief Complaint   Patient presents with    Concern For ATHVV-17 (Coronavirus)       History Provided By: Patient    HPI: Epi Atkins, 39 y.o. female presents ambulatory to the emergency department with request for Covid screening. Patient states that she has had nasal and chest congestion since awakening this morning. She denied any concern for exposure to Covid. She denied fever, chills. Her cough is been nonproductive. She is a smoker but declined any x-rays. She denied history of asthma or chronic lung conditions. She has had no rash or lesion. No abdominal pain, nausea, vomiting, or diarrhea. She denied pain at present. She has not been vaccinated. Pt is o/w healthy without ST, shortness of breath, chest pain. There are no other complaints, changes, or physical findings at this time. PCP: Aggie Enrique NP    Current Outpatient Medications   Medication Sig Dispense Refill    escitalopram oxalate (LEXAPRO) 10 mg tablet TAKE 1 TABLET BY MOUTH EVERY DAY (Patient not taking: Reported on 8/13/2021)      mirtazapine (REMERON) 7.5 mg tablet TAKE 1 TABLET BY MOUTH EVERY DAY AT NIGHT (Patient not taking: Reported on 8/13/2021)      traZODone (DESYREL) 50 mg tablet TAKE 1 TABLET BY MOUTH EVERY DAY AT BEDTIME AS NEEDED (Patient not taking: Reported on 8/13/2021)      ergocalciferol (ERGOCALCIFEROL) 1,250 mcg (50,000 unit) capsule Take 1 Capsule by mouth every seven (7) days. 8 Capsule 0    ibuprofen (MOTRIN) 800 mg tablet Take 1 Tablet by mouth every eight (8) hours as needed for Pain. With food 60 Tablet 0    fluconazole (Diflucan) 150 mg tablet Take 1 Tablet by mouth as needed (vaginal discharge and itching).  (Patient not taking: Reported on 8/13/2021) 2 Tablet 0    medroxyPROGESTERone (DEPO-PROVERA) 150 mg/mL syrg INJECT 1 ML BY INTRAMUSCULAR ROUTE EVERY THREE (3) MONTHS. 1 Syringe 2    ALPRAZolam (XANAX) 1 mg tablet       albuterol (PROVENTIL HFA, VENTOLIN HFA, PROAIR HFA) 90 mcg/actuation inhaler Take 1-2 Puffs by inhalation every four (4) hours as needed for Wheezing. 1 Inhaler 0    QUEtiapine (SEROQUEL) 100 mg tablet TAKE 1 TABLET BY MOUTH AT BEDTIME 30 Tab 1    OXcarbazepine (TRILEPTAL) 300 mg tablet Take 1 Tab by mouth three (3) times daily. 80 Tab 1       Past History     Past Medical History:  Past Medical History:   Diagnosis Date    Asthma     Back pain     Bipolar 1 disorder (HCC)     Gastrointestinal disorder     IBS    Other ill-defined conditions(919.89)     Frequent Boils    Scoliosis        Past Surgical History:  Past Surgical History:   Procedure Laterality Date    HX TONSILLECTOMY      HX WISDOM TEETH EXTRACTION         Family History:  Family History   Problem Relation Age of Onset    Cancer Neg Hx     Hypertension Neg Hx     Diabetes Neg Hx     Stroke Neg Hx        Social History:  Social History     Tobacco Use    Smoking status: Former Smoker     Packs/day: 0.50     Years: 2.00     Pack years: 1.00     Quit date: 2013     Years since quittin.1    Smokeless tobacco: Never Used    Tobacco comment: denies   Substance Use Topics    Alcohol use: No     Alcohol/week: 0.0 standard drinks    Drug use: No       Allergies: Allergies   Allergen Reactions    Naprosyn [Naproxen] Nausea and Vomiting         Review of Systems   Review of Systems   Constitutional: Negative for chills and fever. HENT: Negative for congestion, rhinorrhea and sore throat. Eyes: Positive for redness and itching. Negative for discharge. Respiratory: Positive for cough. Negative for shortness of breath and wheezing. Cardiovascular: Negative for chest pain and palpitations. Gastrointestinal: Negative for diarrhea, nausea and vomiting. Endocrine: Negative for polydipsia, polyphagia and polyuria.    Genitourinary: Negative for dysuria and hematuria. Musculoskeletal: Negative for neck pain and neck stiffness. Skin: Negative for rash and wound. Allergic/Immunologic: Negative for food allergies and immunocompromised state. Neurological: Negative for dizziness, weakness and headaches. Hematological: Negative for adenopathy. Does not bruise/bleed easily. Psychiatric/Behavioral: Negative for agitation and confusion. All other systems reviewed and are negative. Physical Exam   Physical Exam  Vitals and nursing note reviewed. Constitutional:       General: She is not in acute distress. Appearance: Normal appearance. She is well-developed and normal weight. She is not ill-appearing, toxic-appearing or diaphoretic. HENT:      Head: Normocephalic and atraumatic. Right Ear: Tympanic membrane, ear canal and external ear normal. There is no impacted cerumen. Left Ear: Tympanic membrane, ear canal and external ear normal. There is no impacted cerumen. Nose: Congestion and rhinorrhea present. Mouth/Throat:      Mouth: Mucous membranes are moist.      Pharynx: No oropharyngeal exudate. Eyes:      General: No scleral icterus. Right eye: No discharge. Left eye: No discharge. Extraocular Movements: Extraocular movements intact. Conjunctiva/sclera: Conjunctivae normal.      Pupils: Pupils are equal, round, and reactive to light. Neck:      Thyroid: No thyromegaly. Vascular: No JVD. Trachea: No tracheal deviation. Cardiovascular:      Rate and Rhythm: Normal rate and regular rhythm. Pulses: Normal pulses. Heart sounds: Normal heart sounds. Pulmonary:      Effort: Pulmonary effort is normal. No respiratory distress. Breath sounds: Normal breath sounds. No wheezing. Abdominal:      Palpations: Abdomen is soft. Tenderness: There is no abdominal tenderness. Musculoskeletal:         General: No deformity. Normal range of motion.       Cervical back: Normal range of motion and neck supple. No rigidity. Lymphadenopathy:      Cervical: No cervical adenopathy. Skin:     General: Skin is warm and dry. Coloration: Skin is not pale. Findings: No bruising, erythema or rash. Neurological:      General: No focal deficit present. Mental Status: She is alert and oriented to person, place, and time. Motor: No weakness or abnormal muscle tone. Coordination: Coordination normal.   Psychiatric:         Mood and Affect: Mood normal.         Behavior: Behavior normal.         Judgment: Judgment normal.         Diagnostic Study Results     Labs -   No results found for this or any previous visit (from the past 12 hour(s)). Radiologic Studies -   No orders to display         Medical Decision Making   I am the first provider for this patient. I reviewed the vital signs, available nursing notes, past medical history, past surgical history, family history and social history. Vital Signs-Reviewed the patient's vital signs. Patient Vitals for the past 12 hrs:   Temp Pulse Resp BP SpO2   09/21/21 1124 98 °F (36.7 °C) 65 20 110/76 99 %           Records Reviewed: Nursing Notes, Old Medical Records, Previous Radiology Studies and Previous Laboratory Studies    Provider Notes (Medical Decision Making): COVID screening    ED Course:   Initial assessment performed. The patients presenting problems have been discussed, and they are in agreement with the care plan formulated and outlined with them. I have encouraged them to ask questions as they arise throughout their visit. Pt declined xray. TOBACCO CESSATION COUNSELING  The patient was counseled on the dangers of tobacco use, and was advised to quit. Reviewed strategies to maximize success, including written materials. The evaluation, management, and disposition decisions of this patient have been made in the context of the current and rapidly developing COVID-19 pandemic.  In my clinical judgment, the balance of clinical factors dictate expedited evaluation and discharge from the ED. I have carefully considered the risk and benefits of prolonged ED workups and/or hospitalization vs their risk of acquiring or transmitting COVID-19. I have made reasonable efforts to conserve healthcare resources and defer to safe outpatient alternatives when feasible. I have also discussed the importance of social distancing and proper hygiene to the patient. Based on an appropriate medical screening exam, there is currently no evidence of an emergency medical condition in the patient, and she is clinically safe for discharge. This was a collective decision made with the patient and/or any available family/caretakers. They expressed understanding and agreement with the above. Leonel Juan           PLAN:  1. Discharge Medication List as of 9/21/2021 12:00 PM        2. Follow-up Information     Follow up With Specialties Details Why [address] [facility], [address], NP Nurse Practitioner   Suri Prasad  [address] [address] [phone]      [facility] EMERGENCY DEPT Emergency Medicine  If symptoms worsen Jagdeep Soni  [phone]        Return to ED if worse     Diagnosis     Clinical Impression:   1. Encounter for screening for COVID-19    2. Cough    3.  Tobacco dependence

## 2021-09-21 NOTE — LETTER
[facility] EMERGENCY DEPT  [address] [facility] [address] [phone]    Work/School Note    Date: 9/21/2021     To Whom It May concern:    Damir Huber was evaulated by the following provider(s):  Attending Provider: Marquis Mcburney, MD  Physician Assistant: Ileana Gonzalez virus is suspected. Per the CDC guidelines we recommend home isolation until the following conditions are all met:    1. At least 10 days have passed since symptoms first appeared and  2. At least 24 hours have passed since last fever without the use of fever-reducing medications and  3.  Symptoms (e.g., cough, shortness of breath) have improved    Sincerely,          Leonel Juan

## 2021-09-22 LAB
SARS-COV-2, XPLCVT: NOT DETECTED
SOURCE, COVRS: NORMAL

## 2021-10-08 RX ORDER — ERGOCALCIFEROL 1.25 MG/1
50000 CAPSULE ORAL
Qty: 8 CAPSULE | Refills: 0 | Status: SHIPPED | OUTPATIENT
Start: 2021-10-08 | End: 2022-01-06

## 2021-11-03 ENCOUNTER — TELEPHONE (OUTPATIENT)
Dept: INTERNAL MEDICINE CLINIC | Age: 36
End: 2021-11-03

## 2021-11-03 NOTE — TELEPHONE ENCOUNTER
----- Message from Shalonda Hunter sent at 11/3/2021 10:17 AM EDT -----  Subject: Message to Provider    QUESTIONS  Information for Provider? Patient called in today requesting to schedule   appt with PCP for UTI. Patient stated at home UTI test was positive and   she was having severe back pain (red flag Nurse Triage symptom). Patient   on hold for 20+ minutes with Nurse Triage, patient disconnected. Please   follow up to get patient scheduled. ---------------------------------------------------------------------------  --------------  Tabitha FEGN  What is the best way for the office to contact you? OK to leave message on   voicemail  Preferred Call Back Phone Number? [phone]  ---------------------------------------------------------------------------  --------------  SCRIPT ANSWERS  Relationship to Patient?  Self

## 2021-12-29 ENCOUNTER — HOSPITAL ENCOUNTER (EMERGENCY)
Age: 36
Discharge: HOME | End: 2021-12-29
Attending: EMERGENCY MEDICINE
Payer: COMMERCIAL

## 2021-12-29 VITALS
OXYGEN SATURATION: 100 % | TEMPERATURE: 98.7 F | RESPIRATION RATE: 18 BRPM | HEIGHT: 66 IN | DIASTOLIC BLOOD PRESSURE: 71 MMHG | HEART RATE: 86 BPM | BODY MASS INDEX: 26.2 KG/M2 | WEIGHT: 163 LBS | SYSTOLIC BLOOD PRESSURE: 109 MMHG

## 2021-12-29 DIAGNOSIS — J45.40 MODERATE PERSISTENT ASTHMA WITHOUT COMPLICATION: ICD-10-CM

## 2021-12-29 DIAGNOSIS — S39.012A BACK STRAIN, INITIAL ENCOUNTER: Primary | ICD-10-CM

## 2021-12-29 LAB
APPEARANCE UR: CLEAR
BACTERIA URNS QL MICRO: NEGATIVE /HPF
BILIRUB UR QL: NEGATIVE
CLUE CELLS VAG QL WET PREP: NORMAL
COLOR UR: ABNORMAL
EPITH CASTS URNS QL MICRO: ABNORMAL /LPF
GLUCOSE UR STRIP.AUTO-MCNC: NEGATIVE MG/DL
HGB UR QL STRIP: NEGATIVE
KETONES UR QL STRIP.AUTO: NEGATIVE MG/DL
LEUKOCYTE ESTERASE UR QL STRIP.AUTO: NEGATIVE
NITRITE UR QL STRIP.AUTO: NEGATIVE
PH UR STRIP: 6.5 [PH] (ref 5–8)
PROT UR STRIP-MCNC: NEGATIVE MG/DL
RBC #/AREA URNS HPF: ABNORMAL /HPF (ref 0–5)
SP GR UR REFRACTOMETRY: 1.02 (ref 1–1.03)
T VAGINALIS VAG QL WET PREP: NORMAL
UA: UC IF INDICATED,UAUC: ABNORMAL
UROBILINOGEN UR QL STRIP.AUTO: 0.2 EU/DL (ref 0.2–1)
WBC URNS QL MICRO: ABNORMAL /HPF (ref 0–4)

## 2021-12-29 PROCEDURE — 87491 CHLMYD TRACH DNA AMP PROBE: CPT

## 2021-12-29 PROCEDURE — 87210 SMEAR WET MOUNT SALINE/INK: CPT

## 2021-12-29 PROCEDURE — 74011250637 HC RX REV CODE- 250/637: Performed by: EMERGENCY MEDICINE

## 2021-12-29 PROCEDURE — 99283 EMERGENCY DEPT VISIT LOW MDM: CPT

## 2021-12-29 PROCEDURE — 81001 URINALYSIS AUTO W/SCOPE: CPT

## 2021-12-29 RX ORDER — METHOCARBAMOL 500 MG/1
500 TABLET, FILM COATED ORAL 3 TIMES DAILY
Qty: 15 TABLET | Refills: 0 | OUTPATIENT
Start: 2021-12-29 | End: 2022-04-16

## 2021-12-29 RX ORDER — ALBUTEROL SULFATE 90 UG/1
1-2 AEROSOL, METERED RESPIRATORY (INHALATION)
Qty: 1 EACH | Refills: 0 | Status: SHIPPED | OUTPATIENT
Start: 2021-12-29

## 2021-12-29 RX ORDER — METHOCARBAMOL 500 MG/1
500 TABLET, FILM COATED ORAL ONCE
Status: COMPLETED | OUTPATIENT
Start: 2021-12-29 | End: 2021-12-29

## 2021-12-29 RX ORDER — IBUPROFEN 800 MG/1
800 TABLET ORAL
Qty: 30 TABLET | Refills: 0 | Status: SHIPPED | OUTPATIENT
Start: 2021-12-29 | End: 2022-04-16 | Stop reason: SDUPTHER

## 2021-12-29 RX ADMIN — METHOCARBAMOL 500 MG: 500 TABLET ORAL at 08:43

## 2021-12-29 NOTE — ED NOTES
Discharge instructions were given to the patient by Sabra Saleh. The patient left the Emergency Department ambulatory, alert and oriented and in no acute distress with 3 prescriptions. The patient was encouraged to call or return to the ED for worsening issues or problems and was encouraged to schedule a follow up appointment for continuing care. The patient verbalized understanding of discharge instructions and prescriptions, all questions were answered. The patient has no further concerns at this time.

## 2021-12-29 NOTE — ED PROVIDER NOTES
EMERGENCY DEPARTMENT HISTORY AND PHYSICAL EXAM      Date: 12/29/2021  Patient Name: Sameer Olivier    History of Presenting Illness     Chief Complaint   Patient presents with    Back Pain     History Provided By: Patient    HPI: Sameer Olivier, 39 y.o. female with past medical history significant for asthma, back pain, bipolar disorder, and IBS who presents via private vehicle to the ED with cc of bilateral low back pain that is worse on the right for the past 2 days. Patient states she was moving her patient the day that her symptoms started. She works as a private duty nurse. She has not been back to work since. Her pain is described as a dull/aching pain that is worse with certain positions, bending, and twisting. She denies any bowel/bladder incontinence, radiation of her pain, or focal neurologic deficits. She has tried taking 400 mg of ibuprofen this morning without relief. She states she has had similar symptoms in the past and was diagnosed with bacterial vaginosis. She denies any vaginal discharge, recent sexual intercourse, or any vaginal odors. She denies any urinary frequency or fevers. PMHx: Asthma, back pain, bipolar, IBS, and scoliosis  Social Hx: Former smoker, denies alcohol use, denies illegal drug use    PCP: Yury Fan NP    There are no other complaints, changes, or physical findings at this time. No current facility-administered medications on file prior to encounter. Current Outpatient Medications on File Prior to Encounter   Medication Sig Dispense Refill    medroxyPROGESTERone (DEPO-PROVERA) 150 mg/mL syrg INJECT 1 ML BY INTRAMUSCULAR ROUTE EVERY THREE (3) MONTHS. 1 Each 3    ALPRAZolam (XANAX) 1 mg tablet       QUEtiapine (SEROQUEL) 100 mg tablet TAKE 1 TABLET BY MOUTH AT BEDTIME 30 Tab 1    OXcarbazepine (TRILEPTAL) 300 mg tablet Take 1 Tab by mouth three (3) times daily.  90 Tab 1    ergocalciferol (ERGOCALCIFEROL) 1,250 mcg (50,000 unit) capsule TAKE 1 CAPSULE BY MOUTH EVERY SEVEN (7) DAYS. (Patient not taking: Reported on 2021) 8 Capsule 0    escitalopram oxalate (LEXAPRO) 10 mg tablet TAKE 1 TABLET BY MOUTH EVERY DAY (Patient not taking: Reported on 2021)      mirtazapine (REMERON) 7.5 mg tablet TAKE 1 TABLET BY MOUTH EVERY DAY AT NIGHT (Patient not taking: Reported on 2021)      traZODone (DESYREL) 50 mg tablet TAKE 1 TABLET BY MOUTH EVERY DAY AT BEDTIME AS NEEDED (Patient not taking: Reported on 2021)      [DISCONTINUED] ibuprofen (MOTRIN) 800 mg tablet Take 1 Tablet by mouth every eight (8) hours as needed for Pain. With food 60 Tablet 0    fluconazole (Diflucan) 150 mg tablet Take 1 Tablet by mouth as needed (vaginal discharge and itching). (Patient not taking: Reported on 2021) 2 Tablet 0    [DISCONTINUED] albuterol (PROVENTIL HFA, VENTOLIN HFA, PROAIR HFA) 90 mcg/actuation inhaler Take 1-2 Puffs by inhalation every four (4) hours as needed for Wheezing. (Patient not taking: Reported on 2021) 1 Inhaler 0     Past History     Past Medical History:  Past Medical History:   Diagnosis Date    Asthma     Back pain     Bipolar 1 disorder ([facility] [address]     Gastrointestinal disorder     IBS    Other ill-defined conditions(799.89)     Frequent Boils    Scoliosis      Past Surgical History:  Past Surgical History:   Procedure Laterality Date    HX TONSILLECTOMY      HX WISDOM TEETH EXTRACTION       Family History:  Family History   Problem Relation Age of Onset    Cancer Neg Hx     Hypertension Neg Hx     Diabetes Neg Hx     Stroke Neg Hx      Social History:  Social History     Tobacco Use    Smoking status: Former Smoker     Packs/day: 0.50     Years: 2.00     Pack years: 1.00     Quit date: 2013     Years since quittin.4    Smokeless tobacco: Never Used   Substance Use Topics    Alcohol use: No     Alcohol/week: 0.0 standard drinks    Drug use: No     Allergies:   Allergies   Allergen Reactions    Naprosyn [Naproxen] Hives and Nausea and Vomiting     Can take ibuprofen     Review of Systems   Review of Systems   Constitutional: Negative for chills and fever. HENT: Negative for congestion, rhinorrhea, sneezing and sore throat. Eyes: Negative for redness and visual disturbance. Respiratory: Negative for shortness of breath. Cardiovascular: Negative for leg swelling. Gastrointestinal: Negative for abdominal pain, nausea and vomiting. Genitourinary: Negative for difficulty urinating and frequency. Musculoskeletal: Positive for back pain. Negative for myalgias and neck stiffness. Skin: Negative for rash. Neurological: Negative for dizziness, syncope, weakness and headaches. Hematological: Negative for adenopathy. All other systems reviewed and are negative. Physical Exam   Physical Exam  Vitals and nursing note reviewed. Constitutional:       Appearance: Normal appearance. She is well-developed. HENT:      Head: Normocephalic and atraumatic. Eyes:      Conjunctiva/sclera: Conjunctivae normal.   Cardiovascular:      Rate and Rhythm: Normal rate and regular rhythm. Pulses: Normal pulses. Heart sounds: Normal heart sounds, S1 normal and S2 normal.   Pulmonary:      Effort: Pulmonary effort is normal. No respiratory distress. Breath sounds: Normal breath sounds. No wheezing. Abdominal:      General: Bowel sounds are normal. There is no distension. Palpations: Abdomen is soft. Tenderness: There is no abdominal tenderness. There is no rebound. Musculoskeletal:         General: Normal range of motion. Cervical back: Normal, full passive range of motion without pain, normal range of motion and neck supple. Thoracic back: Normal.      Lumbar back: Spasms and tenderness present. No bony tenderness. Back:    Skin:     General: Skin is warm and dry. Findings: No rash.    Neurological:      Mental Status: She is alert and oriented to person, place, and time.   Psychiatric:         Speech: Speech normal.         Behavior: Behavior normal.         Thought Content: Thought content normal.         Judgment: Judgment normal.       Diagnostic Study Results   Labs -     Recent Results (from the past 12 hour(s))   WET PREP    Collection Time: 12/29/21  8:36 AM    Specimen: Miscellaneous sample   Result Value Ref Range    Clue cells CLUE CELLS ABSENT      Wet prep NO TRICHOMONAS SEEN     URINALYSIS W/ REFLEX CULTURE    Collection Time: 12/29/21  8:36 AM    Specimen: Urine   Result Value Ref Range    Color YELLOW/STRAW      Appearance CLEAR CLEAR      Specific gravity 1.025 1.003 - 1.030      pH (UA) 6.5 5.0 - 8.0      Protein Negative NEG mg/dL    Glucose Negative NEG mg/dL    Ketone Negative NEG mg/dL    Bilirubin Negative NEG      Blood Negative NEG      Urobilinogen 0.2 0.2 - 1.0 EU/dL    Nitrites Negative NEG      Leukocyte Esterase Negative NEG      WBC 0-4 0 - 4 /hpf    RBC 0-5 0 - 5 /hpf    Epithelial cells MODERATE (A) FEW /lpf    Bacteria Negative NEG /hpf    UA:UC IF INDICATED CULTURE NOT INDICATED BY UA RESULT CNI         Radiologic Studies -   No orders to display     No results found. Medical Decision Making   I am the first provider for this patient. I reviewed the vital signs, available nursing notes, past medical history, past surgical history, family history and social history. Vital Signs-Reviewed the patient's vital signs. Patient Vitals for the past 24 hrs:   Temp Pulse Resp BP SpO2   12/29/21 0801 98.7 °F (37.1 °C) 86 18 109/71 100 %     Pulse Oximetry Analysis - 100% on RA (normal)    Records Reviewed: Nursing Notes and Old Medical Records    Provider Notes (Medical Decision Making):   40-year-old female presents with bilateral low back pain right greater than left for the past 2 days. Differential includes musculoskeletal strain, degenerative disc disease, herniated disc, and low suspicion for UTI/pyelonephritis/kidney stones.     ED Course: Initial assessment performed. The patients presenting problems have been discussed, and they are in agreement with the care plan formulated and outlined with them. I have encouraged them to ask questions as they arise throughout their visit. Progress Note  10:03 AM  I have re-evaluated pt and she states her symptoms have improved. Her swabs are unremarkable. Will discharge with a prescription for Robaxin, ibuprofen, and refill her albuterol inhaler and have her follow-up with outpatient primary care. Progress Note:   Updated pt on all returned results and findings. Discussed the importance of proper follow up as referred below along with return precautions. Pt in agreement with the care plan and expresses agreement with and understanding of all items discussed. Disposition:  Discharge Note:  The pt is ready for discharge. The pt's signs, symptoms, diagnosis, and discharge instructions have been discussed and pt has conveyed their understanding. The pt is to follow up as recommended or return to ER should their symptoms worsen. Plan has been discussed and pt is in agreement. PLAN:  1. Current Discharge Medication List      START taking these medications    Details   ibuprofen (MOTRIN) 800 mg tablet Take 1 Tablet by mouth every eight (8) hours as needed for Pain. Qty: 30 Tablet, Refills: 0  Start date: 12/29/2021      methocarbamoL (Robaxin) 500 mg tablet Take 1 Tablet by mouth three (3) times daily. Qty: 15 Tablet, Refills: 0  Start date: 12/29/2021         CONTINUE these medications which have CHANGED    Details   albuterol (PROVENTIL HFA, VENTOLIN HFA, PROAIR HFA) 90 mcg/actuation inhaler Take 1-2 Puffs by inhalation every four (4) hours as needed for Wheezing. Qty: 1 Each, Refills: 0  Start date: 12/29/2021    Associated Diagnoses: Moderate persistent asthma without complication           2.    Follow-up Information     Follow up With Specialties Details Why [address] [facility] [address], NP Nurse Practitioner Schedule an appointment as soon as possible for a visit   Suri Byers  [address]  [phone]          Return to ED if worse     Diagnosis     Clinical Impression:   1. Back strain, initial encounter    2. Moderate persistent asthma without complication            Please note that this dictation was completed with Dragon, computer voice recognition software. Quite often unanticipated grammatical, syntax, homophones, and other interpretive errors are inadvertently transcribed by the computer software. Please disregard these errors. Additionally, please excuse any errors that have escaped final proofreading.

## 2021-12-29 NOTE — ED NOTES
Pt presents to ED ambulatory complaining of lower back pain x 2 days. Pt reports taking ibuprofen without relief, last dose yesterday. Pt reports similar happening before and being diagnosed with BV. Pt is alert and oriented x 4, RR even and unlabored, skin is warm and dry. Assessment completed and pt updated on plan of care. Call bell in reach. Emergency Department Nursing Plan of Care       The Nursing Plan of Care is developed from the Nursing assessment and Emergency Department Attending provider initial evaluation. The plan of care may be reviewed in the ED Provider note.     The Plan of Care was developed with the following considerations:   Patient / Family readiness to learn indicated by:verbalized understanding  Persons(s) to be included in education: patient  Barriers to Learning/Limitations:No    Signed     Victory Bence, RN    12/29/2021   8:46 AM

## 2021-12-29 NOTE — ED TRIAGE NOTES
C\o of lower back pain x1 day. Pt states she recently had BV and was treated, but back pain has come back and feels its due to BV again.

## 2021-12-29 NOTE — Clinical Note
[address] EMERGENCY DEPT  [address] [facility] [address]  [phone]    Work/School Note    Date: 12/29/2021    To Whom It May concern:      Minor Macedo was seen and treated today in the emergency room by the following provider(s):  Attending Provider: Jorge Meng MD.      Minor Macedo is excused from work/school on 12/29/21. She is clear to return to work/school on 12/30/21.         Sincerely,          Fela Ryan MD

## 2021-12-31 LAB
C TRACH RRNA SPEC QL NAA+PROBE: NEGATIVE
N GONORRHOEA RRNA SPEC QL NAA+PROBE: NEGATIVE
SPECIMEN SOURCE: NORMAL

## 2022-01-07 ENCOUNTER — OFFICE VISIT (OUTPATIENT)
Dept: INTERNAL MEDICINE CLINIC | Age: 37
End: 2022-01-07
Payer: COMMERCIAL

## 2022-01-07 DIAGNOSIS — Z30.42 ENCOUNTER FOR DEPO-PROVERA CONTRACEPTION: Primary | ICD-10-CM

## 2022-01-07 PROCEDURE — 96372 THER/PROPH/DIAG INJ SC/IM: CPT | Performed by: NURSE PRACTITIONER

## 2022-01-07 NOTE — PROGRESS NOTES
Subjective:      Delia Velasco is here for her depoprovera injection. Patient wishes to continue depoprovera treatment for contraception. Side effects of treatment to date: NONE  Standing order is on patient's medication list.    Last Depoprovera injection date: 09/03/2021  Last Pap smear date: 05/03/2018    Objective:     Visit Vitals  LMP  (LMP Unknown)       Assessment/Plan:     Stable, doing well on Depoprovera, appropriate to continue. Depoprovera 150 mg IM given. She tolerated the injection well, see Immunization activity for details. Renny Sahu, LPN     Given in left GLUTE, tolerated well no reactions noted.  Next depo 3/25/2022-4/8/2022

## 2022-02-11 ENCOUNTER — HOSPITAL ENCOUNTER (OUTPATIENT)
Dept: LAB | Age: 37
Discharge: HOME | End: 2022-02-11
Payer: COMMERCIAL

## 2022-02-11 ENCOUNTER — OFFICE VISIT (OUTPATIENT)
Dept: INTERNAL MEDICINE CLINIC | Age: 37
End: 2022-02-11
Payer: COMMERCIAL

## 2022-02-11 VITALS
TEMPERATURE: 97.5 F | BODY MASS INDEX: 26.52 KG/M2 | HEIGHT: 66 IN | HEART RATE: 73 BPM | RESPIRATION RATE: 18 BRPM | DIASTOLIC BLOOD PRESSURE: 76 MMHG | OXYGEN SATURATION: 100 % | SYSTOLIC BLOOD PRESSURE: 114 MMHG | WEIGHT: 165 LBS

## 2022-02-11 DIAGNOSIS — Z13.0 SCREENING FOR ENDOCRINE, NUTRITIONAL, METABOLIC AND IMMUNITY DISORDER: ICD-10-CM

## 2022-02-11 DIAGNOSIS — Z01.419 WELL WOMAN EXAM WITH ROUTINE GYNECOLOGICAL EXAM: Primary | ICD-10-CM

## 2022-02-11 DIAGNOSIS — Z13.29 SCREENING FOR ENDOCRINE, NUTRITIONAL, METABOLIC AND IMMUNITY DISORDER: ICD-10-CM

## 2022-02-11 DIAGNOSIS — Z13.228 SCREENING FOR ENDOCRINE, NUTRITIONAL, METABOLIC AND IMMUNITY DISORDER: ICD-10-CM

## 2022-02-11 DIAGNOSIS — N93.9 VAGINAL SPOTTING: ICD-10-CM

## 2022-02-11 DIAGNOSIS — Z13.220 SCREENING FOR LIPID DISORDERS: ICD-10-CM

## 2022-02-11 DIAGNOSIS — E55.9 VITAMIN D DEFICIENCY: ICD-10-CM

## 2022-02-11 DIAGNOSIS — Z13.21 SCREENING FOR ENDOCRINE, NUTRITIONAL, METABOLIC AND IMMUNITY DISORDER: ICD-10-CM

## 2022-02-11 DIAGNOSIS — Z12.4 SCREENING FOR MALIGNANT NEOPLASM OF CERVIX: ICD-10-CM

## 2022-02-11 PROCEDURE — 99395 PREV VISIT EST AGE 18-39: CPT | Performed by: NURSE PRACTITIONER

## 2022-02-11 PROCEDURE — 87624 HPV HI-RISK TYP POOLED RSLT: CPT

## 2022-02-11 PROCEDURE — 87625 HPV TYPES 16 & 18 ONLY: CPT

## 2022-02-11 PROCEDURE — 88175 CYTOPATH C/V AUTO FLUID REDO: CPT

## 2022-02-11 NOTE — PROGRESS NOTES
Subjective:   39 y.o. female for Well Woman Check. No LMP recorded (lmp unknown). Patient has had an injection. Social History: single partner, contraception - Depo-Provera injections. Pertinent past medical hstory: see PMH. Patient Active Problem List   Diagnosis Code    Bipolar 1 disorder ([facility] [address] F31.9    Encounter for Depo-Provera contraception Z30.42    Left foot pain M79.672    Irregular menses N92.6     Patient Active Problem List    Diagnosis Date Noted    Irregular menses 08/28/2018    Left foot pain 05/03/2018    Encounter for Depo-Provera contraception 09/20/2017    [facility]      Current Outpatient Medications   Medication Sig Dispense Refill    ergocalciferol (ERGOCALCIFEROL) 1,250 mcg (50,000 unit) capsule TAKE 1 CAPSULE BY MOUTH EVERY SEVEN (7) DAYS. 12 Capsule 3    ibuprofen (MOTRIN) 800 mg tablet Take 1 Tablet by mouth every eight (8) hours as needed for Pain. 30 Tablet 0    albuterol (PROVENTIL HFA, VENTOLIN HFA, PROAIR HFA) 90 mcg/actuation inhaler Take 1-2 Puffs by inhalation every four (4) hours as needed for Wheezing. 1 Each 0    medroxyPROGESTERone (DEPO-PROVERA) 150 mg/mL syrg INJECT 1 ML BY INTRAMUSCULAR ROUTE EVERY THREE (3) MONTHS. 1 Each 3    ALPRAZolam (XANAX) 1 mg tablet       QUEtiapine (SEROQUEL) 100 mg tablet TAKE 1 TABLET BY MOUTH AT BEDTIME 30 Tab 1    OXcarbazepine (TRILEPTAL) 300 mg tablet Take 1 Tab by mouth three (3) times daily. 90 Tab 1    methocarbamoL (Robaxin) 500 mg tablet Take 1 Tablet by mouth three (3) times daily.  (Patient not taking: Reported on 2/11/2022) 15 Tablet 0    escitalopram oxalate (LEXAPRO) 10 mg tablet TAKE 1 TABLET BY MOUTH EVERY DAY (Patient not taking: Reported on 8/13/2021)      mirtazapine (REMERON) 7.5 mg tablet TAKE 1 TABLET BY MOUTH EVERY DAY AT NIGHT (Patient not taking: Reported on 8/13/2021)      traZODone (DESYREL) 50 mg tablet TAKE 1 TABLET BY MOUTH EVERY DAY AT BEDTIME AS NEEDED (Patient not taking: Reported on 2021)      fluconazole (Diflucan) 150 mg tablet Take 1 Tablet by mouth as needed (vaginal discharge and itching). (Patient not taking: Reported on 2021) 2 Tablet 0     Allergies   Allergen Reactions    Naprosyn [Naproxen] Hives and Nausea and Vomiting     Can take ibuprofen     Past Medical History:   Diagnosis Date    Asthma     Back pain     Bipolar 1 disorder (HCC)     Gastrointestinal disorder     IBS    Other ill-defined conditions(799.89)     Frequent Boils    Scoliosis      Past Surgical History:   Procedure Laterality Date    HX TONSILLECTOMY      HX WISDOM TEETH EXTRACTION       Family History   Problem Relation Age of Onset    Cancer Neg Hx     Hypertension Neg Hx     Diabetes Neg Hx     Stroke Neg Hx      Social History     Tobacco Use    Smoking status: Former Smoker     Packs/day: 0.50     Years: 2.00     Pack years: 1.00     Quit date: 2013     Years since quittin.5    Smokeless tobacco: Never Used   Substance Use Topics    Alcohol use: No     Alcohol/week: 0.0 standard drinks        ROS:  Feeling well. No dyspnea or chest pain on exertion. No abdominal pain, change in bowel habits, black or bloody stools. No urinary tract symptoms. GYN ROS: no breast pain or new or enlarging lumps on self exam, she complains of vaginal spotting for past 1 week. Also with SANDRA ear irritation r/t earbud use, with scabs and discomfort for past few weeks. No neurological complaints. Objective:     Visit Vitals  /76 (BP 1 Location: Left upper arm, BP Patient Position: Sitting, BP Cuff Size: Large adult)   Pulse 73   Temp 97.5 °F (36.4 °C) (Temporal)   Resp 18   Ht 5' 6\" (1.676 m)   Wt 165 lb (74.8 kg)   LMP  (LMP Unknown)   SpO2 100%   BMI 26.63 kg/m²     The patient appears well, alert, oriented x 3, in no distress. ENT normal.  Neck supple. No adenopathy or thyromegaly. DEE. Lungs are clear, good air entry, no wheezes, rhonchi or rales.  S1 and S2 normal, no murmurs, regular rate and rhythm. Abdomen soft without tenderness, guarding, mass or organomegaly. Extremities show no edema, normal peripheral pulses. Neurological is normal, no focal findings. BREAST EXAM: breasts appear normal, no suspicious masses, no skin or nipple changes or axillary nodes, + bilateral nipple piercings, no infection or drainage noted. PELVIC EXAM: normal external genitalia, vulva, vagina, cervix, uterus and adnexa, small blood noted from cervix, PAP: Pap smear done today, thin-prep method    Assessment/Plan:   well woman  pap smear  Ear contact dermatitis- avoid trigger, try earbud covering. Use OTC HC cream, INI use mychart for kenalog rx. Recurrent vag discharge- avoid BATHS with SOAP. Use OTC agents BEFORE seeking care. counseled on breast self exam and STD prevention  additional lab tests per orders  return annually or prn    ICD-10-CM ICD-9-CM    1. Well woman exam with routine gynecological exam  Z01.419 V72.31 PAP IG, APTIMA HPV AND RFX 16/18,45 ([id number])      METABOLIC PANEL, COMPREHENSIVE      CBC WITH AUTOMATED DIFF      HEMOGLOBIN A1C WITH EAG      LIPID PANEL      TSH 3RD GENERATION      T4 (THYROXINE)      VITAMIN D, 25 HYDROXY      HCG QL SERUM    [V72.31]   2. Screening for malignant neoplasm of cervix  Z12.4 V76.2 PAP IG, APTIMA HPV AND RFX 16/18,45 ([id number])   3. Screening for lipid disorders  Z13.220 V77.91 LIPID PANEL   4. Screening for endocrine, nutritional, metabolic and immunity disorder  T88.24 P69.89 METABOLIC PANEL, COMPREHENSIVE    Z13.21  CBC WITH AUTOMATED DIFF    Z13.228  HEMOGLOBIN A1C WITH EAG    Z13.0  TSH 3RD GENERATION      T4 (THYROXINE)   5. Vitamin D deficiency  E55.9 268.9 VITAMIN D, 25 HYDROXY   6. Vaginal spotting  N93.9 623.8 HCG QL SERUM   .

## 2022-02-11 NOTE — PATIENT INSTRUCTIONS
Try hydrocortisone ointment to ears, if no improvement reach out for kenalog ointment instead. Take baths WITHOUT using soap, reserve for END OF BATH to avoid vaginal irritation. Await ITCHING and vag discharge, then use MONISTAT or SIMILAR med over the counter. If neither helps, THEN reach out for meds to treat, diflucan usually. Dermatitis: Care Instructions  Your Care Instructions  Dermatitis is the general name used for any rash or inflammation of the skin. Different kinds of dermatitis cause different kinds of rashes. Common causes of a rash include new medicines, plants (such as poison oak or poison ivy), heat, and stress. Certain illnesses can also cause a rash. An allergic reaction to something that touches your skin, such as latex, nickel, or poison ivy, is called contact dermatitis. Contact dermatitis may also be caused by something that irritates the skin, such as bleach, a chemical, or soap. These types of rashes cannot be spread from person to person. How long your rash will last depends on what caused it. Rashes may last a few days or months. Follow-up care is a key part of your treatment and safety. Be sure to make and go to all appointments, and call your doctor if you are having problems. It's also a good idea to know your test results and keep a list of the medicines you take. How can you care for yourself at home? · Do not scratch the rash. Cut your nails short, and file them smooth. Or wear gloves if this helps keep you from scratching. · Wash the area with water only. Pat dry. · Put cold, wet cloths on the rash to reduce itching. · Keep cool, and stay out of the sun. · Leave the rash open to the air as much as possible. · If the rash itches, use hydrocortisone cream. Follow the directions on the label. Calamine lotion may help for plant rashes. · Take an over-the-counter antihistamine, such as diphenhydramine (Benadryl) or loratadine (Claritin), to help calm the itching.  Read and follow all instructions on the label. · If your doctor prescribed a cream, use it as directed. If your doctor prescribed medicine, take it exactly as directed. When should you call for help? Call your doctor now or seek immediate medical care if:    · You have symptoms of infection, such as:  ? Increased pain, swelling, warmth, or redness. ? Red streaks leading from the area. ? Pus draining from the area. ? A fever.     · You have joint pain along with the rash. Watch closely for changes in your health, and be sure to contact your doctor if:    · Your rash is changing or getting worse.     · You are not getting better as expected. Where can you learn more? Go to http://www.gray.com/  Enter F270 in the search box to learn more about \"Dermatitis: Care Instructions. \"  Current as of: March 3, 2021               Content Version: 13.0  © 0122-3939 [employer]. Care instructions adapted under license by [employer] (which disclaims liability or warranty for this information). If you have questions about a medical condition or this instruction, always ask your healthcare professional. [address] any warranty or liability for your use of this information.

## 2022-02-11 NOTE — PROGRESS NOTES
Pt is here for   Chief Complaint   Patient presents with    Well Woman     pap     1. Have you been to the ER, urgent care clinic since your last visit? Hospitalized since your last visit? No    2. Have you seen or consulted any other health care providers outside of the [address] [place] [address] since your last visit? Include any pap smears or colon screening.  No

## 2022-02-12 LAB
25(OH)D3+25(OH)D2 SERPL-MCNC: 15.9 NG/ML (ref 30–100)
ALBUMIN SERPL-MCNC: 4.6 G/DL (ref 3.8–4.8)
ALBUMIN/GLOB SERPL: 1.6 {RATIO} (ref 1.2–2.2)
ALP SERPL-CCNC: 79 IU/L (ref 44–121)
ALT SERPL-CCNC: 4 IU/L (ref 0–32)
AST SERPL-CCNC: 17 IU/L (ref 0–40)
B-HCG SERPL QL: NEGATIVE MIU/ML
BASOPHILS # BLD AUTO: 0.1 X10E3/UL (ref 0–0.2)
BASOPHILS NFR BLD AUTO: 1 %
BILIRUB SERPL-MCNC: 0.3 MG/DL (ref 0–1.2)
BUN SERPL-MCNC: 6 MG/DL (ref 6–20)
BUN/CREAT SERPL: 6 (ref 9–23)
CALCIUM SERPL-MCNC: 9.4 MG/DL (ref 8.7–10.2)
CHLORIDE SERPL-SCNC: 105 MMOL/L (ref 96–106)
CHOLEST SERPL-MCNC: 178 MG/DL (ref 100–199)
CO2 SERPL-SCNC: 19 MMOL/L (ref 20–29)
CREAT SERPL-MCNC: 1.04 MG/DL (ref 0.57–1)
EOSINOPHIL # BLD AUTO: 0.1 X10E3/UL (ref 0–0.4)
EOSINOPHIL NFR BLD AUTO: 1 %
ERYTHROCYTE [DISTWIDTH] IN BLOOD BY AUTOMATED COUNT: 11.8 % (ref 11.7–15.4)
EST. AVERAGE GLUCOSE BLD GHB EST-MCNC: 108 MG/DL
GLOBULIN SER CALC-MCNC: 2.9 G/DL (ref 1.5–4.5)
GLUCOSE SERPL-MCNC: 91 MG/DL (ref 65–99)
HBA1C MFR BLD: 5.4 % (ref 4.8–5.6)
HCT VFR BLD AUTO: 42.2 % (ref 34–46.6)
HDLC SERPL-MCNC: 56 MG/DL
HGB BLD-MCNC: 14 G/DL (ref 11.1–15.9)
IMM GRANULOCYTES # BLD AUTO: 0 X10E3/UL (ref 0–0.1)
IMM GRANULOCYTES NFR BLD AUTO: 0 %
IMP & REVIEW OF LAB RESULTS: NORMAL
LDLC SERPL CALC-MCNC: 110 MG/DL (ref 0–99)
LYMPHOCYTES # BLD AUTO: 2.2 X10E3/UL (ref 0.7–3.1)
LYMPHOCYTES NFR BLD AUTO: 32 %
MCH RBC QN AUTO: 29.6 PG (ref 26.6–33)
MCHC RBC AUTO-ENTMCNC: 33.2 G/DL (ref 31.5–35.7)
MCV RBC AUTO: 89 FL (ref 79–97)
MONOCYTES # BLD AUTO: 0.5 X10E3/UL (ref 0.1–0.9)
MONOCYTES NFR BLD AUTO: 8 %
NEUTROPHILS # BLD AUTO: 4 X10E3/UL (ref 1.4–7)
NEUTROPHILS NFR BLD AUTO: 58 %
PLATELET # BLD AUTO: 235 X10E3/UL (ref 150–450)
POTASSIUM SERPL-SCNC: 4.4 MMOL/L (ref 3.5–5.2)
PROT SERPL-MCNC: 7.5 G/DL (ref 6–8.5)
RBC # BLD AUTO: 4.73 X10E6/UL (ref 3.77–5.28)
SODIUM SERPL-SCNC: 140 MMOL/L (ref 134–144)
T4 SERPL-MCNC: 9.2 UG/DL (ref 4.5–12)
TRIGL SERPL-MCNC: 61 MG/DL (ref 0–149)
TSH SERPL DL<=0.005 MIU/L-ACNC: 0.86 UIU/ML (ref 0.45–4.5)
VLDLC SERPL CALC-MCNC: 12 MG/DL (ref 5–40)
WBC # BLD AUTO: 6.8 X10E3/UL (ref 3.4–10.8)

## 2022-02-14 NOTE — PROGRESS NOTES
Overall, your LABS look GREAT/NORMAL! Keep up the good work! You still have a VITAMIN D DEFICIENCY. Be sure to TAKE your prescription for Vitamin D sent to pharmacy. Also try getting at least one hour of direct sunlight helps raise your vitamin D level. Your CHOLESTEROL is UP, with your LDL (bad cholesterol) over  goal level of 100 and HDL (good/protective) under 60. Stay active, eat a LOW-CARB diet (avoiding bread, rice, pasta, desserts, soda)  with fish and other lean meats, take a fish oil supplement with DHA and EPA daily, along with exercise to help reduce cholesterol and raise good cholesterol. Eating healthy nuts like walnuts, pecans, and ALMONDS may help also. You can also try PURPLE SEA QUIGLEY (found in health food stores like [employer] and [employer]). TRY RED YEAST RICE (if your LDL is HIGH) OR Flush-free NIACIN 500mg (if your TRIG or up), once daily helps to improve your numbers too. Do NOT take Niacin or red yeast rice together, they interact with each other. Both are intended to be taken WITH FISH OIL however. Continue current regimen.

## 2022-02-27 ENCOUNTER — HOSPITAL ENCOUNTER (EMERGENCY)
Age: 37
Discharge: HOME | End: 2022-02-27
Attending: EMERGENCY MEDICINE
Payer: COMMERCIAL

## 2022-02-27 VITALS
BODY MASS INDEX: 27.49 KG/M2 | HEART RATE: 79 BPM | DIASTOLIC BLOOD PRESSURE: 90 MMHG | HEIGHT: 65 IN | RESPIRATION RATE: 16 BRPM | OXYGEN SATURATION: 99 % | SYSTOLIC BLOOD PRESSURE: 124 MMHG | WEIGHT: 165 LBS | TEMPERATURE: 97.7 F

## 2022-02-27 DIAGNOSIS — J06.9 ACUTE UPPER RESPIRATORY INFECTION: ICD-10-CM

## 2022-02-27 DIAGNOSIS — Z20.822 SUSPECTED COVID-19 VIRUS INFECTION: Primary | ICD-10-CM

## 2022-02-27 DIAGNOSIS — R68.89 FLU-LIKE SYMPTOMS: ICD-10-CM

## 2022-02-27 DIAGNOSIS — J45.21 MILD INTERMITTENT CHRONIC ASTHMA WITH ACUTE EXACERBATION: ICD-10-CM

## 2022-02-27 DIAGNOSIS — J45.40 MODERATE PERSISTENT ASTHMA WITHOUT COMPLICATION: ICD-10-CM

## 2022-02-27 LAB
FLUAV AG NPH QL IA: NEGATIVE
FLUBV AG NOSE QL IA: NEGATIVE

## 2022-02-27 PROCEDURE — 99283 EMERGENCY DEPT VISIT LOW MDM: CPT

## 2022-02-27 PROCEDURE — U0005 INFEC AGEN DETEC AMPLI PROBE: HCPCS

## 2022-02-27 PROCEDURE — 74011636637 HC RX REV CODE- 636/637: Performed by: EMERGENCY MEDICINE

## 2022-02-27 PROCEDURE — 87804 INFLUENZA ASSAY W/OPTIC: CPT

## 2022-02-27 PROCEDURE — 74011250637 HC RX REV CODE- 250/637: Performed by: EMERGENCY MEDICINE

## 2022-02-27 RX ORDER — IBUPROFEN 800 MG/1
800 TABLET ORAL
Qty: 20 TABLET | Refills: 0 | Status: SHIPPED | OUTPATIENT
Start: 2022-02-27 | End: 2022-03-06

## 2022-02-27 RX ORDER — GUAIFENESIN 600 MG/1
600 TABLET, EXTENDED RELEASE ORAL 2 TIMES DAILY
Qty: 20 TABLET | Refills: 0 | Status: SHIPPED | OUTPATIENT
Start: 2022-02-27 | End: 2022-07-22

## 2022-02-27 RX ORDER — BENZONATATE 100 MG/1
200 CAPSULE ORAL ONCE
Status: COMPLETED | OUTPATIENT
Start: 2022-02-27 | End: 2022-02-27

## 2022-02-27 RX ORDER — PREDNISONE 20 MG/1
60 TABLET ORAL ONCE
Status: COMPLETED | OUTPATIENT
Start: 2022-02-27 | End: 2022-02-27

## 2022-02-27 RX ORDER — IBUPROFEN 400 MG/1
800 TABLET ORAL
Status: COMPLETED | OUTPATIENT
Start: 2022-02-27 | End: 2022-02-27

## 2022-02-27 RX ORDER — PREDNISONE 50 MG/1
50 TABLET ORAL DAILY
Qty: 5 TABLET | Refills: 0 | Status: SHIPPED | OUTPATIENT
Start: 2022-02-27 | End: 2022-03-04

## 2022-02-27 RX ADMIN — PREDNISONE 60 MG: 20 TABLET ORAL at 07:19

## 2022-02-27 RX ADMIN — BENZONATATE 200 MG: 100 CAPSULE ORAL at 07:20

## 2022-02-27 RX ADMIN — IBUPROFEN 800 MG: 400 TABLET, FILM COATED ORAL at 07:20

## 2022-02-27 NOTE — Clinical Note
[facility] EMERGENCY DEPT  [address] [facility] [id number] [phone]    Work/School Note    Date: 2/27/2022     To Whom It May concern:    Marcial Guallpa was evaluated by the following provider(s):  Attending Provider: Kwame Starr MD.   [place] Sent[place] virus is suspected. Per the CDC guidelines we recommend home isolation until the following conditions are all met:    1. At least five days have passed since symptoms first appeared and/or had a close exposure,   2. After home isolation for five days, wearing a mask around others for the next five days,  3. At least 24 have passed since last fever without the use of fever-reducing medications and  4.  Symptoms (eg cough, shortness of breath) have improved      Sincerely,          Adela Aguirre MD

## 2022-02-27 NOTE — ED NOTES
Emergency Department Nursing Plan of Care       The Nursing Plan of Care is developed from the Nursing assessment and Emergency Department Attending provider initial evaluation. The plan of care may be reviewed in the ED Provider note.     The Plan of Care was developed with the following considerations:   Patient / Family readiness to learn indicated by:verbalized understanding and successful return demonstration  Persons(s) to be included in education: patient  Barriers to Learning/Limitations:No    Signed     Amari Ybarra RN    2/27/2022   7:31 AM

## 2022-02-27 NOTE — ED PROVIDER NOTES
EMERGENCY DEPARTMENT HISTORY AND PHYSICAL EXAM      Please note that this dictation was completed with the assistance of \"Dragon\", the computer voice recognition software. Quite often unanticipated grammatical, syntax, homophones, and other interpretive errors are inadvertently transcribed by the computer software. Please disregard these errors and any errors that have escaped final proofreading. Thank you. Patient: Leila Bowers  DOS: 22  : 1985  MRN: [id number]  History of Presenting Illness     Chief Complaint   Patient presents with    Flu Like Symptoms     sore throat, runny nose, hx asthma feels \"tight\" in chest.  pt is smoker used inhaler without relief. History Provided By: Patient/family/EMS (if applicable)    HPI: Leila Bowers, 39 y.o. female with past medical history as documented below presents to the ED with c/o of one day hx of sore throat, runny nose, and wheezing. Pt states she tried using her inhaler relief of sx's. Pt denies any other exacerbating or ameliorating factors. Additionally, pt specifically denies any recent fever, chills, headache, nausea, vomiting, abdominal pain, CP, SOB, lightheadedness, dizziness, numbness, weakness, lower extremity swelling, heart palpitations, urinary sxs, diarrhea, constipation, melena, hematochezia. There are no other complaints, changes or physical findings pertinent to the HPI at this time.     PCP: Jenny De La Rosa NP  Past History   Past Medical History:  Past Medical History:   Diagnosis Date    Asthma     Back pain     Bipolar 1 disorder ([address]     Gastrointestinal disorder     IBS    Other ill-defined conditions(799.89)     Frequent Boils    Scoliosis        Past Surgical History:  Past Surgical History:   Procedure Laterality Date    HX TONSILLECTOMY      HX WISDOM TEETH EXTRACTION         Family History:   Family history reviewed and was non-contributory, unless specified below:  Family History   Problem Relation Age of Onset    Cancer Neg Hx     Hypertension Neg Hx     Diabetes Neg Hx     Stroke Neg Hx        Social History:  Social History     Tobacco Use    Smoking status: Former Smoker     Packs/day: 0.50     Years: 2.00     Pack years: 1.00     Quit date: 2013     Years since quittin.5    Smokeless tobacco: Never Used   Substance Use Topics    Alcohol use: No     Alcohol/week: 0.0 standard drinks    Drug use: No       Allergies: Allergies   Allergen Reactions    Naprosyn [Naproxen] Hives and Nausea and Vomiting     Can take ibuprofen       Current Medications:  No current facility-administered medications on file prior to encounter. Current Outpatient Medications on File Prior to Encounter   Medication Sig Dispense Refill    ergocalciferol (ERGOCALCIFEROL) 1,250 mcg (50,000 unit) capsule TAKE 1 CAPSULE BY MOUTH EVERY SEVEN (7) DAYS. 12 Capsule 3    ibuprofen (MOTRIN) 800 mg tablet Take 1 Tablet by mouth every eight (8) hours as needed for Pain. 30 Tablet 0    methocarbamoL (Robaxin) 500 mg tablet Take 1 Tablet by mouth three (3) times daily. (Patient not taking: Reported on 2022) 15 Tablet 0    albuterol (PROVENTIL HFA, VENTOLIN HFA, PROAIR HFA) 90 mcg/actuation inhaler Take 1-2 Puffs by inhalation every four (4) hours as needed for Wheezing. 1 Each 0    medroxyPROGESTERone (DEPO-PROVERA) 150 mg/mL syrg INJECT 1 ML BY INTRAMUSCULAR ROUTE EVERY THREE (3) MONTHS. 1 Each 3    escitalopram oxalate (LEXAPRO) 10 mg tablet TAKE 1 TABLET BY MOUTH EVERY DAY (Patient not taking: Reported on 2021)      mirtazapine (REMERON) 7.5 mg tablet TAKE 1 TABLET BY MOUTH EVERY DAY AT NIGHT (Patient not taking: Reported on 2021)      traZODone (DESYREL) 50 mg tablet TAKE 1 TABLET BY MOUTH EVERY DAY AT BEDTIME AS NEEDED (Patient not taking: Reported on 2021)      fluconazole (Diflucan) 150 mg tablet Take 1 Tablet by mouth as needed (vaginal discharge and itching).  (Patient not taking: Reported on 8/13/2021) 2 Tablet 0    ALPRAZolam (XANAX) 1 mg tablet       QUEtiapine (SEROQUEL) 100 mg tablet TAKE 1 TABLET BY MOUTH AT BEDTIME 30 Tab 1    OXcarbazepine (TRILEPTAL) 300 mg tablet Take 1 Tab by mouth three (3) times daily. 90 Tab 1     Review of Systems   A complete ROS was reviewed by me today and all other systems negative, unless otherwise specified below:  Review of Systems   Constitutional: Negative. Negative for chills and fever. HENT: Positive for congestion and sore throat. Eyes: Negative. Respiratory: Positive for cough and wheezing. Negative for chest tightness and shortness of breath. Cardiovascular: Negative. Negative for chest pain, palpitations and leg swelling. Gastrointestinal: Negative. Negative for abdominal distention, abdominal pain, blood in stool, constipation, diarrhea, nausea and vomiting. Endocrine: Negative. Genitourinary: Negative. Negative for dysuria, flank pain, frequency, hematuria and urgency. Musculoskeletal: Negative. Negative for arthralgias, back pain and myalgias. Skin: Negative. Negative for color change and rash. Neurological: Negative. Negative for dizziness, syncope, speech difficulty, weakness, light-headedness, numbness and headaches. Hematological: Negative. Psychiatric/Behavioral: Negative. Negative for confusion and self-injury. The patient is not nervous/anxious. All other systems reviewed and are negative. Physical Exam   Physical Exam  Vitals and nursing note reviewed. Constitutional:       General: She is not in acute distress. Appearance: She is well-developed. She is not diaphoretic. HENT:      Head: Normocephalic and atraumatic. Nose: Congestion present. Mouth/Throat:      Pharynx: No oropharyngeal exudate. Eyes:      Conjunctiva/sclera: Conjunctivae normal.   Cardiovascular:      Rate and Rhythm: Normal rate and regular rhythm. Heart sounds: Normal heart sounds.    Pulmonary:      Effort: Pulmonary effort is normal. No respiratory distress. Breath sounds: Wheezing present. No rales. Chest:      Chest wall: No tenderness. Abdominal:      General: Bowel sounds are normal. There is no distension. Palpations: Abdomen is soft. There is no mass. Tenderness: There is no abdominal tenderness. There is no guarding or rebound. Musculoskeletal:         General: Normal range of motion. Cervical back: Normal range of motion. Skin:     General: Skin is warm. Neurological:      Mental Status: She is alert and oriented to person, place, and time. Cranial Nerves: No cranial nerve deficit. Motor: No abnormal muscle tone. Diagnostic Study Results     Laboratory Data  I have personally reviewed and interpreted all available laboratory results. Recent Results (from the past 24 hour(s))   INFLUENZA A+B VIRAL AGS    Collection Time: 02/27/22  7:18 AM   Result Value Ref Range    Influenza A Antigen Negative NEG      Influenza B Antigen Negative NEG         Radiologic Studies   I have personally reviewed and interpreted all available imaging studies and agree with radiology interpretation. No orders to display     CT Results  (Last 48 hours)    None        CXR Results  (Last 48 hours)    None        Medical Decision Making   I am the first and primary ED physician for this patient's ED visit today. I reviewed our electronic medical record system for any past medical records that may contribute to the patient's current condition, including their past medical history, surgical history, social and family history. This also includes their most recent ED visits, previous hospitalizations and prior diagnostic data. I have reviewed and summarized the most pertinent findings in my HPI and MDM.     Vital Signs Reviewed:  Patient Vitals for the past 24 hrs:   Temp Pulse Resp BP SpO2   02/27/22 0648 97.7 °F (36.5 °C) 79 16 (!) 124/90 99 %     Pulse Oximetry Analysis: 99% on RA    Cardiac Monitor:   Rate: 79 bpm  The cardiac monitor revealed the following rhythm as interpreted by me: Normal Sinus Rhythm  Cardiac monitoring was ordered to monitor patient for signs of cardiac dysrhythmia, which they are at risk for based on their history and/or risk for cardiovascular disease and/or metabolic abnormalities. Records Reviewed: Nursing Notes, Old Medical Records, Previous electrocardiograms, Previous Radiology Studies and Previous Laboratory Studies, EMS reports    Provider Notes (Medical Decision Making):   Pt presents with acute URI symptoms including nasal congestion, rhinorrhea and sore throat. Pt also has c/o of cough without dyspnea, chest pain or wheezing. Pt is well-appearing with stable vitals and benign exam; symptoms are consistent with an uncomplicated URI. DDx: COVID-19, acute bronchitis, bacterial sinusitis vs. pharyngitis, migraine, flu. Symptomatic therapy suggested: acetaminophen, ibuprofen, antihistamine-decongestant of choice, cough suppressant of choice. Increase fluids, use vaporizer, stay in steamy bathroom tid 15 min prn severe cough, tylenol as needed, rest, avoid smoky areas. Lack of antibiotic effectiveness discussed with her. Symptomatic therapy suggested: gargle for sore throat, use mist at bedside for congestion. Apply facial warm packs for sinus pain or use nasal saline sprays. Follow up prn if not better in 72 hours. ED Course:   Initial assessment performed. I discussed presenting problems and concerns, and my formulated plan for today's visit with the patient and any available family members. I have encouraged them to ask questions as they arise throughout the visit.    Social History     Tobacco Use    Smoking status: Former Smoker     Packs/day: 0.50     Years: 2.00     Pack years: 1.00     Quit date: 2013     Years since quittin.5    Smokeless tobacco: Never Used   Substance Use Topics    Alcohol use: No     Alcohol/week: 0.0 standard drinks    Drug use: No       ED Orders Placed:  Orders Placed This Encounter    SARS-COV-2    INFLUENZA A+B VIRAL AGS    predniSONE (DELTASONE) tablet 60 mg    benzonatate (TESSALON) capsule 200 mg    ibuprofen (MOTRIN) tablet 800 mg    predniSONE (DELTASONE) 50 mg tablet    ibuprofen (MOTRIN) 800 mg tablet    guaiFENesin ER (Mucinex) 600 mg ER tablet       ED Medications Administered During ED Course:  Medications   predniSONE (DELTASONE) tablet 60 mg (60 mg Oral Given 2/27/22 0719)   benzonatate (TESSALON) capsule 200 mg (200 mg Oral Given 2/27/22 0720)   ibuprofen (MOTRIN) tablet 800 mg (800 mg Oral Given 2/27/22 0720)         Progress Note:  Given concerns for COVID-19 infection in this patient, I spent extra time to ensure proper and full PPE was used for the initial assessment and for subsequent patient encounters for updates and reassessments. This was done to help combat the transmission of the virus to myself and other patients and staff in the emergency department. Progress note:  Pt notes feeling better after ED treatment. Pt ambulated with pulse ox with saturations maintain > 92% and tolerated well. Discussed lab and imaging findings with pt, specifically noting possible COVID-19 infection. Patient instructed on proper hygiene and self-quarantine, as well as lying prone for 3 hours a day. Pt instructed to self-isolate at home until 3 days after symptoms have resolved AND 7 days after symptoms first started, whichever is later. Provided with [address] handout for likely COVID infection recommendations. Pt will follow up with health department or this emergency department immediately should symptoms worsen at any time as instructed. All questions have been answered, pt voiced understanding and agreement with plan. Progress Note:  I have just re-evaluated the patient. Patient reports improvement of sx's.   I have reviewed Her vital signs and determined there is currently no worsening in their condition or physical exam. Results have been reviewed with them and their questions have been answered. We will continue to review further results as they come available. Progress Note:  Pt reassessed and symptoms noted to have improved significantly after ED treatment. Pt is clinically stable for discharge. Yue Galloway's labs and imaging have been reviewed with her and available family. She verbally conveys understanding and agreement of the signs, symptoms, diagnosis, treatment and prognosis and additionally agrees to follow up as recommended with Dr. Maranda Reyes, NP and/or specialist as instructed. She agrees with the care plan we have created and conveys that all of her questions have been answered. Additionally, I have put together a packet of discharge instructions for her that include: 1) educational information regarding their diagnosis, 2) how to care for their diagnosis at home, as well a 3) list of reasons why they would want to return to the ED prior to their follow-up appointment should the patient's condition change or symptoms worsen. I have answered all questions to the patient's satisfaction. Strict return precautions given. She conveyed understanding and agreement with care plan. Vital signs stable for discharge. Disposition:  DISCHARGE  The pt is ready for discharge. The pt's signs, symptoms, diagnosis, and discharge instructions have been discussed and pt has conveyed their understanding. The pt is to follow up as recommended or return to ER should their symptoms worsen. Plan has been discussed and pt is in agreement. Plan:  1. Return precautions as discussed with patient and available family/caregiver. 2.   Discharge Medication List as of 2/27/2022  7:05 AM      START taking these medications    Details   predniSONE (DELTASONE) 50 mg tablet Take 1 Tablet by mouth daily for 5 days. , Normal, Disp-5 Tablet, R-0      !! ibuprofen (MOTRIN) 800 mg tablet Take 1 Tablet by mouth every six (6) hours as needed for Pain for up to 7 days. , Normal, Disp-20 Tablet, R-0      guaiFENesin ER (Mucinex) 600 mg ER tablet Take 1 Tablet by mouth two (2) times a day., Normal, Disp-20 Tablet, R-0       !! - Potential duplicate medications found. Please discuss with provider. CONTINUE these medications which have NOT CHANGED    Details   ergocalciferol (ERGOCALCIFEROL) 1,250 mcg (50,000 unit) capsule TAKE 1 CAPSULE BY MOUTH EVERY SEVEN (7) DAYS., Normal, Disp-12 Capsule, R-3      !! ibuprofen (MOTRIN) 800 mg tablet Take 1 Tablet by mouth every eight (8) hours as needed for Pain., Normal, Disp-30 Tablet, R-0      methocarbamoL (Robaxin) 500 mg tablet Take 1 Tablet by mouth three (3) times daily. , Normal, Disp-15 Tablet, R-0      albuterol (PROVENTIL HFA, VENTOLIN HFA, PROAIR HFA) 90 mcg/actuation inhaler Take 1-2 Puffs by inhalation every four (4) hours as needed for Wheezing., Normal, Disp-1 Each, R-0      medroxyPROGESTERone (DEPO-PROVERA) 150 mg/mL syrg INJECT 1 ML BY INTRAMUSCULAR ROUTE EVERY THREE (3) MONTHS., Normal, Disp-1 Each, R-3      escitalopram oxalate (LEXAPRO) 10 mg tablet TAKE 1 TABLET BY MOUTH EVERY DAY, Historical Med      mirtazapine (REMERON) 7.5 mg tablet TAKE 1 TABLET BY MOUTH EVERY DAY AT NIGHT, Historical Med      traZODone (DESYREL) 50 mg tablet TAKE 1 TABLET BY MOUTH EVERY DAY AT BEDTIME AS NEEDED, Historical Med      fluconazole (Diflucan) 150 mg tablet Take 1 Tablet by mouth as needed (vaginal discharge and itching). , Normal, Disp-2 Tablet, R-0      ALPRAZolam (XANAX) 1 mg tablet Historical Med      QUEtiapine (SEROQUEL) 100 mg tablet TAKE 1 TABLET BY MOUTH AT BEDTIME, Normal, Disp-30 Tab, R-1      OXcarbazepine (TRILEPTAL) 300 mg tablet Take 1 Tab by mouth three (3) times daily. , Normal, Disp-90 Tab, R-1       !! - Potential duplicate medications found. Please discuss with provider.         3.   Follow-up Information     Follow up With Specialties Details Why Contact Nam Eddy NP Nurse Practitioner  As needed, If symptoms worsen [place]  [address]      [facility] EMERGENCY DEPT Emergency Medicine  As needed, If symptoms worsen Susana Diaz        Instructed to return to ED if worse  Diagnosis   Clinical Impression:  1. Suspected COVID-19 virus infection    2. Flu-like symptoms    3. Moderate persistent asthma without complication    4. Acute upper respiratory infection    5. Mild intermittent chronic asthma with acute exacerbation      Attestation:  Ida Madsen MD, am the attending of record for this patient. I personally performed the services described in this documentation on this date, 2/27/2022 for patient, Shama Kellogg. I have reviewed the chart and verified that the record is accurate and complete.

## 2022-02-27 NOTE — DISCHARGE INSTRUCTIONS
Thank You! It was a pleasure taking care of you in our Emergency Department today. We know that when you come to [address], you are entrusting us with your health, comfort, and safety. Our physicians and nurses honor that trust, and truly appreciate the opportunity to care for you and your loved ones. We also value your feedback. If you receive a survey about your Emergency Department experience today, please fill it out. We care about our patients' feedback, and we listen to what you have to say. Thank you. Dr. Katarina Manzanares M.D.      ____________________________________________________________________  I have included a copy of your lab results and/or radiologic studies from today's visit so you can have them easily available at your follow-up visit. We hope you feel better and please do not hesitate to contact the ED if you have any questions at all! No results found for this or any previous visit (from the past 12 hour(s)). No orders to display     CT Results  (Last 48 hours)      None          The exam and treatment you received in the Emergency Department were for an urgent problem and are not intended as complete care. It is important that you follow up with a doctor, nurse practitioner, or physician assistant for ongoing care. If your symptoms become worse or you do not improve as expected and you are unable to reach your usual health care provider, you should return to the Emergency Department. We are available 24 hours a day. Please take your discharge instructions with you when you go to your follow-up appointment. If a prescription has been provided, please have it filled as soon as possible to prevent a delay in treatment. Read the entire medication instruction sheet provided to you by the pharmacy.  If you have any questions or reservations about taking the medication due to side effects or interactions with other medications, please call your primary care physician or contact the ER to speak with the charge nurse. Please make an appointment with your family doctor or the physician you were referred to for follow-up of this visit as instructed on your discharge paperwork. Return to the ER if you are unable to be seen or if you are unable to be seen in a timely manner. If you have any problem arranging the follow-up visit, contact the Emergency Department immediately.

## 2022-02-28 ENCOUNTER — PATIENT OUTREACH (OUTPATIENT)
Dept: CASE MANAGEMENT | Age: 37
End: 2022-02-28

## 2022-02-28 LAB
SARS-COV-2, XPLCVT: NOT DETECTED
SOURCE, COVRS: NORMAL

## 2022-02-28 NOTE — PROGRESS NOTES
Patient contacted regarding COVID-19 risk. Discussed COVID-19 related testing which was pending at this time. Test results were pending. Patient informed of results, if available? yes. Ambulatory Care Manager contacted the patient by telephone to perform post discharge assessment. Call within 2 business days of discharge: Yes Verified name and  with patient as identifiers. Provided introduction to self, and explanation of the CTN/ACM role, and reason for call due to risk factors for infection and/or exposure to COVID-19. Symptoms reviewed with patient who verbalized the following symptoms: no new symptoms and no worsening symptoms      Due to no new or worsening symptoms encounter was not routed to provider for escalation. Discussed follow-up appointments. If no appointment was previously scheduled, appointment scheduling offered:  no. [facility] follow up appointment(s):   Future Appointments   Date Time Provider Suri Prasad   3/29/2022  9:00 AM [facility]   2023  9:00 AM [facility]ames[facility] Cos[facility]     [facility] follow up appointment(s): Interventions to address risk factors: Obtained and reviewed discharge summary and/or continuity of care documents     Advance Care Planning:   Does patient have an Advance Directive: not on file. Educated patient about risk for severe COVID-19 due to risk factors according to CDC guidelines. ACM reviewed discharge instructions, medical action plan and red flag symptoms with the patient who verbalized understanding. Discussed COVID vaccination status: yes. Education provided on COVID-19 vaccination as appropriate. Discussed exposure protocols and quarantine with CDC Guidelines. Patient was given an opportunity to verbalize any questions and concerns and agrees to contact ACM or health care provider for questions related to their healthcare.     Reviewed and educated patient on any new and changed medications related to discharge diagnosis Was patient discharged with a pulse oximeter? no Discussed and confirmed pulse oximeter discharge instructions and when to notify provider or seek emergency care. ACM provided contact information. Plan for follow-up call in 5-7 days based on severity of symptoms and risk factors.

## 2022-03-07 ENCOUNTER — PATIENT OUTREACH (OUTPATIENT)
Dept: CASE MANAGEMENT | Age: 37
End: 2022-03-07

## 2022-03-18 PROBLEM — M79.672 LEFT FOOT PAIN: Status: ACTIVE | Noted: 2018-05-03

## 2022-03-19 PROBLEM — N92.6 IRREGULAR MENSES: Status: ACTIVE | Noted: 2018-08-28

## 2022-03-20 PROBLEM — Z30.42 ENCOUNTER FOR DEPO-PROVERA CONTRACEPTION: Status: ACTIVE | Noted: 2017-09-20

## 2022-03-25 DIAGNOSIS — Z30.42 ENCOUNTER FOR DEPO-PROVERA CONTRACEPTION: ICD-10-CM

## 2022-03-25 DIAGNOSIS — N92.6 IRREGULAR MENSES: ICD-10-CM

## 2022-03-25 RX ORDER — MEDROXYPROGESTERONE ACETATE 150 MG/ML
INJECTION, SUSPENSION INTRAMUSCULAR
Qty: 1 EACH | Refills: 3
Start: 2022-03-25

## 2022-04-05 ENCOUNTER — CLINICAL SUPPORT (OUTPATIENT)
Dept: INTERNAL MEDICINE CLINIC | Age: 37
End: 2022-04-05
Payer: COMMERCIAL

## 2022-04-05 VITALS
HEIGHT: 65 IN | OXYGEN SATURATION: 98 % | DIASTOLIC BLOOD PRESSURE: 88 MMHG | RESPIRATION RATE: 17 BRPM | TEMPERATURE: 96.9 F | BODY MASS INDEX: 27.99 KG/M2 | HEART RATE: 77 BPM | SYSTOLIC BLOOD PRESSURE: 122 MMHG | WEIGHT: 168 LBS

## 2022-04-05 DIAGNOSIS — Z11.1 SCREENING EXAMINATION FOR PULMONARY TUBERCULOSIS: ICD-10-CM

## 2022-04-05 DIAGNOSIS — Z23 ENCOUNTER FOR IMMUNIZATION: ICD-10-CM

## 2022-04-05 DIAGNOSIS — Z30.42 ENCOUNTER FOR DEPO-PROVERA CONTRACEPTION: Primary | ICD-10-CM

## 2022-04-05 PROCEDURE — 96372 THER/PROPH/DIAG INJ SC/IM: CPT | Performed by: INTERNAL MEDICINE

## 2022-04-05 PROCEDURE — 86580 TB INTRADERMAL TEST: CPT | Performed by: INTERNAL MEDICINE

## 2022-04-05 NOTE — PROGRESS NOTES
Subjective:      Bonnie Ordoñez is here for her depoprovera injection. Patient wishes to continue depoprovera treatment for contraception. Side effects of treatment to date: NONE  Standing order is on patient's medication list.    Last Depoprovera injection date: 01/07/2022  Last Pap smear date: 02/11/2022    Objective: There were no vitals taken for this visit. Assessment/Plan:     Stable, doing well on Depoprovera, appropriate to continue. Depoprovera 150 mg IM given. She tolerated the injection well, see Immunization activity for details. Given in right deltoid IM expected to return between June 21st and July 5th  Renny Sahu LPN       PPD Placement note  Bonnie Ordoñez, 39 y.o. female is here today for placement of PPD test  Reason for PPD test: Empolyment  Pt taken PPD test before: yes  Verified in allergy area and with patient that they are not allergic to the products PPD is made of (Phenol or Tween). NONE  Is patient taking any oral or IV steroid medication now or have they taken it in the last month? NO  Has the patient ever received the BCG vaccine?: NO  Has the patient been in recent contact with anyone known or suspected of having active TB disease?: YES       Date of exposure (if applicable): N/A       Name of person they were exposed to (if applicable): N/A  Patient's Country of origin?: [place]  O: Alert and oriented in NAD. P:  PPD placed on 4/5/2022 in left forearm. Patient advised to return for reading within 48-72 hours.

## 2022-04-08 LAB
MM INDURATION POC: 0 MM (ref 0–5)
PPD POC: NEGATIVE NEGATIVE

## 2022-04-16 ENCOUNTER — HOSPITAL ENCOUNTER (EMERGENCY)
Age: 37
Discharge: HOME | End: 2022-04-16
Attending: EMERGENCY MEDICINE
Payer: COMMERCIAL

## 2022-04-16 VITALS
BODY MASS INDEX: 29.41 KG/M2 | HEART RATE: 110 BPM | RESPIRATION RATE: 18 BRPM | SYSTOLIC BLOOD PRESSURE: 117 MMHG | OXYGEN SATURATION: 98 % | TEMPERATURE: 98.3 F | WEIGHT: 176.5 LBS | DIASTOLIC BLOOD PRESSURE: 88 MMHG | HEIGHT: 65 IN

## 2022-04-16 DIAGNOSIS — M62.838 MUSCLE SPASM: ICD-10-CM

## 2022-04-16 DIAGNOSIS — M54.50 LEFT LUMBAR PAIN: Primary | ICD-10-CM

## 2022-04-16 LAB
APPEARANCE UR: ABNORMAL
BACTERIA URNS QL MICRO: NEGATIVE /HPF
BILIRUB UR QL: NEGATIVE
CLUE CELLS VAG QL WET PREP: NORMAL
COLOR UR: ABNORMAL
EPITH CASTS URNS QL MICRO: ABNORMAL /LPF
GLUCOSE UR STRIP.AUTO-MCNC: NEGATIVE MG/DL
HCG UR QL: NEGATIVE
HGB UR QL STRIP: NEGATIVE
KETONES UR QL STRIP.AUTO: ABNORMAL MG/DL
LEUKOCYTE ESTERASE UR QL STRIP.AUTO: ABNORMAL
NITRITE UR QL STRIP.AUTO: NEGATIVE
PH UR STRIP: 5.5 [PH] (ref 5–8)
PROT UR STRIP-MCNC: ABNORMAL MG/DL
RBC #/AREA URNS HPF: ABNORMAL /HPF (ref 0–5)
SP GR UR REFRACTOMETRY: 1.03 (ref 1–1.03)
T VAGINALIS VAG QL WET PREP: NORMAL
UA: UC IF INDICATED,UAUC: ABNORMAL
UROBILINOGEN UR QL STRIP.AUTO: 1 EU/DL (ref 0.2–1)
WBC URNS QL MICRO: ABNORMAL /HPF (ref 0–4)

## 2022-04-16 PROCEDURE — 99283 EMERGENCY DEPT VISIT LOW MDM: CPT

## 2022-04-16 PROCEDURE — 81025 URINE PREGNANCY TEST: CPT

## 2022-04-16 PROCEDURE — 87210 SMEAR WET MOUNT SALINE/INK: CPT

## 2022-04-16 PROCEDURE — 87491 CHLMYD TRACH DNA AMP PROBE: CPT

## 2022-04-16 PROCEDURE — 81001 URINALYSIS AUTO W/SCOPE: CPT

## 2022-04-16 RX ORDER — TIZANIDINE HYDROCHLORIDE 4 MG/1
4 CAPSULE, GELATIN COATED ORAL EVERY 6 HOURS
Qty: 20 CAPSULE | Refills: 0 | Status: SHIPPED | OUTPATIENT
Start: 2022-04-16 | End: 2022-07-22

## 2022-04-16 RX ORDER — IBUPROFEN 800 MG/1
800 TABLET ORAL
Qty: 30 TABLET | Refills: 0 | Status: SHIPPED | OUTPATIENT
Start: 2022-04-16 | End: 2022-07-22

## 2022-04-16 NOTE — ED NOTES
Emergency Department Nursing Plan of Care       The Nursing Plan of Care is developed from the Nursing assessment and Emergency Department Attending provider initial evaluation. The plan of care may be reviewed in the ED Provider note.     The Plan of Care was developed with the following considerations:   Patient / Family readiness to learn indicated by:verbalized understanding  Persons(s) to be included in education: patient  Barriers to Learning/Limitations:No    Signed     Breezy Solis RN    4/16/2022   7:17 AM

## 2022-04-16 NOTE — ED PROVIDER NOTES
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 female presents to the ED with cc of low back pain. 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 Concurrent with this she concerned about possible UTI or bacterial vaginosis as she has had similar symptoms with that in the past.  The pain is described as aching and is on the left side. There is no associated fever, chills, nausea, vomiting, hematuria or dysuria. She does not know when her last menstrual cycle is due to Depo shot. Her pain is rated at 6/10. There was no treatment prior to arrival.  She denies any history of illicit drug use including IV drug abuse. There are no other complaints, changes, or physical findings at this time. PCP: Cathryn Moore NP    No current facility-administered medications on file prior to encounter. Current Outpatient Medications on File Prior to Encounter   Medication Sig Dispense Refill    guaiFENesin ER (Mucinex) 600 mg ER tablet Take 1 Tablet by mouth two (2) times a day. 20 Tablet 0    ergocalciferol (ERGOCALCIFEROL) 1,250 mcg (50,000 unit) capsule TAKE 1 CAPSULE BY MOUTH EVERY SEVEN (7) DAYS. 12 Capsule 3    albuterol (PROVENTIL HFA, VENTOLIN HFA, PROAIR HFA) 90 mcg/actuation inhaler Take 1-2 Puffs by inhalation every four (4) hours as needed for Wheezing. 1 Each 0    medroxyPROGESTERone (DEPO-PROVERA) 150 mg/mL syrg INJECT 1 ML BY INTRAMUSCULAR ROUTE EVERY THREE (3) MONTHS.  1 Each 3    [DISCONTINUED] ibuprofen (MOTRIN) 800 mg tablet Take 1 Tablet by mouth every eight (8) hours as needed for Pain. 30 Tablet 0    [DISCONTINUED] methocarbamoL (Robaxin) 500 mg tablet Take 1 Tablet by mouth three (3) times daily. (Patient not taking: Reported on 2022) 15 Tablet 0    escitalopram oxalate (LEXAPRO) 10 mg tablet TAKE 1 TABLET BY MOUTH EVERY DAY (Patient not taking: Reported on 2021)      mirtazapine (REMERON) 7.5 mg tablet TAKE 1 TABLET BY MOUTH EVERY DAY AT NIGHT (Patient not taking: Reported on 2021)      traZODone (DESYREL) 50 mg tablet TAKE 1 TABLET BY MOUTH EVERY DAY AT BEDTIME AS NEEDED (Patient not taking: Reported on 2021)      fluconazole (Diflucan) 150 mg tablet Take 1 Tablet by mouth as needed (vaginal discharge and itching). (Patient not taking: Reported on 2021) 2 Tablet 0    ALPRAZolam (XANAX) 1 mg tablet       QUEtiapine (SEROQUEL) 100 mg tablet TAKE 1 TABLET BY MOUTH AT BEDTIME 30 Tab 1    OXcarbazepine (TRILEPTAL) 300 mg tablet Take 1 Tab by mouth three (3) times daily. 80 Tab 1       Past History     Past Medical History:  Past Medical History:   Diagnosis Date    Asthma     Back pain     Bipolar 1 disorder (HCC)     Gastrointestinal disorder     IBS    Other ill-defined conditions(589.89)     Frequent Boils    Scoliosis        Past Surgical History:  Past Surgical History:   Procedure Laterality Date    HX TONSILLECTOMY      HX WISDOM TEETH EXTRACTION         Family History:  Family History   Problem Relation Age of Onset    Cancer Neg Hx     Hypertension Neg Hx     Diabetes Neg Hx     Stroke Neg Hx        Social History:  Social History     Tobacco Use    Smoking status: Former Smoker     Packs/day: 0.50     Years: 2.00     Pack years: 1.00     Quit date: 2013     Years since quittin.7    Smokeless tobacco: Never Used   Substance Use Topics    Alcohol use: No     Alcohol/week: 0.0 standard drinks    Drug use: No       Allergies:   Allergies   Allergen Reactions  Naprosyn [Naproxen] Hives and Nausea and Vomiting     Can take ibuprofen         Review of Systems   Review of Systems   Constitutional: Negative. Negative for chills and fever. HENT: Negative. Negative for congestion and rhinorrhea. Respiratory: Negative. Negative for cough, chest tightness and wheezing. Cardiovascular: Negative. Negative for chest pain and palpitations. Gastrointestinal: Negative. Negative for abdominal pain, constipation, nausea and vomiting. Endocrine: Negative. Genitourinary: Negative. Negative for decreased urine volume, flank pain, hematuria and pelvic pain. Musculoskeletal: Positive for back pain. Negative for neck pain. Skin: Negative. Negative for color change, pallor and rash. Neurological: Negative. Negative for dizziness, seizures, weakness, numbness and headaches. Hematological: Negative. Negative for adenopathy. Psychiatric/Behavioral: Negative. All other systems reviewed and are negative. Physical Exam   Physical Exam  Vitals reviewed. Constitutional:       General: She is not in acute distress. Appearance: She is well-developed. She is not diaphoretic. HENT:      Head: Normocephalic and atraumatic. Mouth/Throat:      Pharynx: No oropharyngeal exudate. Eyes:      General: No scleral icterus. Right eye: No discharge. Left eye: No discharge. Conjunctiva/sclera: Conjunctivae normal.      Pupils: Pupils are equal, round, and reactive to light. Neck:      Vascular: No JVD. Cardiovascular:      Rate and Rhythm: Normal rate and regular rhythm. Heart sounds: Normal heart sounds. No murmur heard. No friction rub. No gallop. Pulmonary:      Effort: Pulmonary effort is normal. No respiratory distress. Breath sounds: Normal breath sounds. No stridor. No wheezing or rales. Chest:      Chest wall: No tenderness. Abdominal:      General: Bowel sounds are normal. There is no distension. Palpations: Abdomen is soft. There is no mass. Tenderness: There is no abdominal tenderness. There is no right CVA tenderness, left CVA tenderness, guarding or rebound. Musculoskeletal:      Cervical back: Normal range of motion and neck supple. Back:       Comments: Paraspinal muscle tenderness on the left greater than the right. No midline bony tenderness redness warmth or step-off. No CVA tenderness   Skin:     General: Skin is warm. Coloration: Skin is not pale. Findings: No rash. Neurological:      Mental Status: She is alert and oriented to person, place, and time. Cranial Nerves: No cranial nerve deficit. Motor: No abnormal muscle tone. Coordination: Coordination normal.      Deep Tendon Reflexes: Reflexes normal.       8:18 AM also discussed with patient. At this time no obvious signs of renal colic no hematuria. No obvious infectious source. Will treat for musculoskeletal pain.   Instructed patient to return for uncontrolled pain weakness numbness or any other concerns    Diagnostic Study Results     Labs -     Recent Results (from the past 12 hour(s))   URINALYSIS W/ REFLEX CULTURE    Collection Time: 04/16/22  7:36 AM    Specimen: Urine   Result Value Ref Range    Color DARK YELLOW      Appearance TURBID (A) CLEAR      Specific gravity 1.030 1.003 - 1.030      pH (UA) 5.5 5.0 - 8.0      Protein TRACE (A) NEG mg/dL    Glucose Negative NEG mg/dL    Ketone TRACE (A) NEG mg/dL    Bilirubin Negative NEG      Blood Negative NEG      Urobilinogen 1.0 0.2 - 1.0 EU/dL    Nitrites Negative NEG      Leukocyte Esterase TRACE (A) NEG      WBC 0-4 0 - 4 /hpf    RBC 0-5 0 - 5 /hpf    Epithelial cells MANY (A) FEW /lpf    Bacteria Negative NEG /hpf    UA:UC IF INDICATED CULTURE NOT INDICATED BY UA RESULT CNI     WET PREP    Collection Time: 04/16/22  7:36 AM    Specimen: Miscellaneous sample   Result Value Ref Range    Clue cells CLUE CELLS ABSENT      Wet prep NO TRICHOMONAS SEEN     HCG URINE, QL. - POC    Collection Time: 04/16/22  7:36 AM   Result Value Ref Range    Pregnancy test,urine (POC) Negative NEG         Radiologic Studies -   No orders to display     CT Results  (Last 48 hours)    None        CXR Results  (Last 48 hours)    None          Medical Decision Making   I am the first provider for this patient. I reviewed the vital signs, available nursing notes, past medical history, past surgical history, family history and social history. Vital Signs-Reviewed the patient's vital signs. Patient Vitals for the past 12 hrs:   Temp Pulse Resp BP SpO2   04/16/22 0707 98.3 °F (36.8 °C) (!) 110 18 117/88 98 %           Records Reviewed: Nursing Notes and Old Medical Records    Provider Notes (Medical Decision Making):   Differential diagnosis-musculoskeletal strain, herniated disc, lumbar fracture, renal colic, STD, BV, UTI    Impression/plan-39year-old female immunocompetent to the ER with musculoskeletal back pain following moving a patient. Concurrent with this she has had similar symptoms been diagnosed with a UTI and BV in the past.  We will check her urine. Suspect this is musculoskeletal pain so we will treat her supportively. She is not immune compromised and has no risk of any infectious or catastrophic neurological process at this time. ED Course:   Initial assessment performed. The patients presenting problems have been discussed, and they are in agreement with the care plan formulated and outlined with them. I have encouraged them to ask questions as they arise throughout their visit. Naila Sherman MD      Disposition:      DC- Adult Discharges: All of the diagnostic tests were reviewed and questions answered. Diagnosis, care plan and treatment options were discussed. The patient understands the instructions and will follow up as directed. The patients results have been reviewed with them.   They have been counseled regarding their diagnosis. The patient verbally convey understanding and agreement of the signs, symptoms, diagnosis, treatment and prognosis and additionally agrees to follow up as recommended with their PCP in 24 - 48 hours. They also agree with the care-plan and convey that all of their questions have been answered. I have also put together some discharge instructions for them that include: 1) educational information regarding their diagnosis, 2) how to care for their diagnosis at home, as well a 3) list of reasons why they would want to return to the ED prior to their follow-up appointment, should their condition change. DISCHARGE PLAN:  1. Current Discharge Medication List      START taking these medications    Details   tiZANidine (ZANAFLEX) 4 mg capsule Take 1 Capsule by mouth every six (6) hours. Qty: 20 Capsule, Refills: 0  Start date: 4/16/2022         CONTINUE these medications which have CHANGED    Details   ibuprofen (MOTRIN) 800 mg tablet Take 1 Tablet by mouth every eight (8) hours as needed for Pain. Qty: 30 Tablet, Refills: 0  Start date: 4/16/2022           2. Follow-up Information     Follow up With Specialties Details Why Contact Info    Jeovanny Goldberg NP Nurse Practitioner Schedule an appointment as soon as possible for a visit in 2 days As needed [address]      [address] DEPT Emergency Medicine  If symptoms worsen [facility]  [phone]        3. Return to ED if worse     Diagnosis     Clinical Impression:   1. Left lumbar pain    2. Muscle spasm        Attestations:    Lola Hashimoto, MD    Please note that this dictation was completed with [employer], the computer voice recognition software. Quite often unanticipated grammatical, syntax, homophones, and other interpretive errors are inadvertently transcribed by the computer software. Please disregard these errors.   Please excuse any errors that have escaped final proofreading. Thank you.

## 2022-04-16 NOTE — ED NOTES
Discharge and medication instructions reviewed with the patient. The patient discharged ambulatory with a steady gait.

## 2022-07-21 PROCEDURE — 99285 EMERGENCY DEPT VISIT HI MDM: CPT

## 2022-07-22 ENCOUNTER — APPOINTMENT (OUTPATIENT)
Dept: GENERAL RADIOLOGY | Age: 37
End: 2022-07-22
Attending: EMERGENCY MEDICINE
Payer: COMMERCIAL

## 2022-07-22 ENCOUNTER — HOSPITAL ENCOUNTER (EMERGENCY)
Age: 37
Discharge: HOME | End: 2022-07-22
Attending: EMERGENCY MEDICINE
Payer: COMMERCIAL

## 2022-07-22 VITALS
DIASTOLIC BLOOD PRESSURE: 72 MMHG | TEMPERATURE: 98.5 F | HEART RATE: 91 BPM | RESPIRATION RATE: 16 BRPM | HEIGHT: 65 IN | WEIGHT: 176 LBS | SYSTOLIC BLOOD PRESSURE: 114 MMHG | OXYGEN SATURATION: 100 % | BODY MASS INDEX: 29.32 KG/M2

## 2022-07-22 DIAGNOSIS — R60.9 DEPENDENT EDEMA: Primary | ICD-10-CM

## 2022-07-22 LAB
ANION GAP SERPL CALC-SCNC: 9 MMOL/L (ref 5–15)
APPEARANCE UR: CLEAR
BACTERIA URNS QL MICRO: NEGATIVE /HPF
BASOPHILS # BLD: 0.1 K/UL (ref 0–0.1)
BASOPHILS NFR BLD: 1 % (ref 0–1)
BILIRUB UR QL: NEGATIVE
BNP SERPL-MCNC: 36 PG/ML (ref 0–125)
BUN SERPL-MCNC: 10 MG/DL (ref 6–20)
BUN/CREAT SERPL: 9 (ref 12–20)
CALCIUM SERPL-MCNC: 8.5 MG/DL (ref 8.5–10.1)
CHLORIDE SERPL-SCNC: 104 MMOL/L (ref 97–108)
CO2 SERPL-SCNC: 26 MMOL/L (ref 21–32)
COLOR UR: ABNORMAL
CREAT SERPL-MCNC: 1.08 MG/DL (ref 0.55–1.02)
DIFFERENTIAL METHOD BLD: NORMAL
EOSINOPHIL # BLD: 0.2 K/UL (ref 0–0.4)
EOSINOPHIL NFR BLD: 3 % (ref 0–7)
EPITH CASTS URNS QL MICRO: NORMAL /LPF
ERYTHROCYTE [DISTWIDTH] IN BLOOD BY AUTOMATED COUNT: 12.5 % (ref 11.5–14.5)
GLUCOSE SERPL-MCNC: 101 MG/DL (ref 65–100)
GLUCOSE UR STRIP.AUTO-MCNC: NEGATIVE MG/DL
HCG UR QL: NEGATIVE
HCT VFR BLD AUTO: 40.9 % (ref 35–47)
HGB BLD-MCNC: 13 G/DL (ref 11.5–16)
HGB UR QL STRIP: NEGATIVE
IMM GRANULOCYTES # BLD AUTO: 0 K/UL (ref 0–0.04)
IMM GRANULOCYTES NFR BLD AUTO: 0 % (ref 0–0.5)
KETONES UR QL STRIP.AUTO: ABNORMAL MG/DL
LEUKOCYTE ESTERASE UR QL STRIP.AUTO: NEGATIVE
LYMPHOCYTES # BLD: 3 K/UL (ref 0.8–3.5)
LYMPHOCYTES NFR BLD: 40 % (ref 12–49)
MCH RBC QN AUTO: 28.6 PG (ref 26–34)
MCHC RBC AUTO-ENTMCNC: 31.8 G/DL (ref 30–36.5)
MCV RBC AUTO: 90.1 FL (ref 80–99)
MONOCYTES # BLD: 0.6 K/UL (ref 0–1)
MONOCYTES NFR BLD: 8 % (ref 5–13)
NEUTS SEG # BLD: 3.5 K/UL (ref 1.8–8)
NEUTS SEG NFR BLD: 48 % (ref 32–75)
NITRITE UR QL STRIP.AUTO: NEGATIVE
NRBC # BLD: 0 K/UL (ref 0–0.01)
NRBC BLD-RTO: 0 PER 100 WBC
PH UR STRIP: 6.5 [PH] (ref 5–8)
PLATELET # BLD AUTO: 246 K/UL (ref 150–400)
PMV BLD AUTO: 9.5 FL (ref 8.9–12.9)
POTASSIUM SERPL-SCNC: 3.7 MMOL/L (ref 3.5–5.1)
PROT UR STRIP-MCNC: ABNORMAL MG/DL
RBC # BLD AUTO: 4.54 M/UL (ref 3.8–5.2)
RBC #/AREA URNS HPF: NORMAL /HPF (ref 0–5)
SODIUM SERPL-SCNC: 139 MMOL/L (ref 136–145)
SP GR UR REFRACTOMETRY: 1.02 (ref 1–1.03)
TROPONIN-HIGH SENSITIVITY: 5 NG/L (ref 0–51)
UROBILINOGEN UR QL STRIP.AUTO: 1 EU/DL (ref 0.2–1)
WBC # BLD AUTO: 7.3 K/UL (ref 3.6–11)
WBC URNS QL MICRO: NORMAL /HPF (ref 0–4)

## 2022-07-22 PROCEDURE — 81001 URINALYSIS AUTO W/SCOPE: CPT

## 2022-07-22 PROCEDURE — 93005 ELECTROCARDIOGRAM TRACING: CPT

## 2022-07-22 PROCEDURE — 83880 ASSAY OF NATRIURETIC PEPTIDE: CPT

## 2022-07-22 PROCEDURE — 71045 X-RAY EXAM CHEST 1 VIEW: CPT

## 2022-07-22 PROCEDURE — 36415 COLL VENOUS BLD VENIPUNCTURE: CPT

## 2022-07-22 PROCEDURE — 81025 URINE PREGNANCY TEST: CPT

## 2022-07-22 PROCEDURE — 80048 BASIC METABOLIC PNL TOTAL CA: CPT

## 2022-07-22 PROCEDURE — 85025 COMPLETE CBC W/AUTO DIFF WBC: CPT

## 2022-07-22 PROCEDURE — 84484 ASSAY OF TROPONIN QUANT: CPT

## 2022-07-22 RX ORDER — FUROSEMIDE 10 MG/ML
40 INJECTION INTRAMUSCULAR; INTRAVENOUS
Status: DISCONTINUED | OUTPATIENT
Start: 2022-07-22 | End: 2022-07-22 | Stop reason: HOSPADM

## 2022-07-22 NOTE — ED PROVIDER NOTES
Please note that this dictation was completed with [employer], the computer voice recognition software. Quite often unanticipated grammatical, syntax, homophones, and other interpretive errors are inadvertently transcribed by the computer software. Please disregard these errors. Please excuse any errors that have escaped final proofreading. 61-year-old female with a history of asthma, anxiety, bipolar disorder, IBS, scoliosis presents with chief complaints of several days of bilateral pedal edema. Denies history of same. Denies increased standing or change in activity. On review of systems patient states that she feels chest pain and shortness of breath \"like any to take a big breath,\" but comments that she also has a history of anxiety and feels similar to that. Patient denies difficulty urinating. Denies cardiac or renal history. Denies any recent long car trips or plane rides. Patient does admit to change in diet, explaining that she has been eating fast food more recently.   Patient states the swelling is equal and she does not have calf pain or leg pain       Past Medical History:   Diagnosis Date    Asthma     Back pain     Bipolar 1 disorder (HCC)     Gastrointestinal disorder     IBS    Other ill-defined conditions(169.89)     Frequent Boils    Scoliosis        Past Surgical History:   Procedure Laterality Date    HX TONSILLECTOMY      HX WISDOM TEETH EXTRACTION           Family History:   Problem Relation Age of Onset    Cancer Neg Hx     Hypertension Neg Hx     Diabetes Neg Hx     Stroke Neg Hx        Social History     Socioeconomic History    Marital status: SINGLE     Spouse name: Not on file    Number of children: Not on file    Years of education: Not on file    Highest education level: Not on file   Occupational History    Not on file   Tobacco Use    Smoking status: Former     Packs/day: 0.50     Years: 2.00     Pack years: 1.00     Types: Cigarettes     Quit date: 8/5/2013     Years since quittin.9    Smokeless tobacco: Never   Substance and Sexual Activity    Alcohol use: No     Alcohol/week: 0.0 standard drinks    Drug use: No    Sexual activity: Yes     Partners: Male     Birth control/protection: Injection   Other Topics Concern    Not on file   Social History Narrative    Not on file     Social Determinants of Health     Financial Resource Strain: Not on file   Food Insecurity: Not on file   Transportation Needs: Not on file   Physical Activity: Not on file   Stress: Not on file   Social Connections: Not on file   Intimate Partner Violence: Not on file   Housing Stability: Not on file         ALLERGIES: Naprosyn [naproxen]    Review of Systems   Constitutional: Negative. Negative for chills, fever and unexpected weight change. HENT: Negative. Negative for congestion and trouble swallowing. Eyes:  Negative for discharge. Respiratory:  Positive for shortness of breath. Negative for cough and chest tightness. Cardiovascular:  Positive for chest pain and leg swelling. Gastrointestinal: Negative. Negative for abdominal distention, abdominal pain, constipation, diarrhea and nausea. Endocrine: Negative. Genitourinary: Negative. Negative for difficulty urinating, dysuria, frequency and urgency. Musculoskeletal: Negative. Negative for arthralgias and myalgias. Skin: Negative. Negative for color change. Allergic/Immunologic: Negative. Neurological: Negative. Negative for dizziness, speech difficulty and headaches. Hematological: Negative. Psychiatric/Behavioral: Negative. Negative for agitation and confusion. All other systems reviewed and are negative. Vitals:    22 2336   BP: 116/83   Pulse: 91   Resp: 16   Temp: 98.5 °F (36.9 °C)   SpO2: 100%   Weight: 79.8 kg (176 lb)   Height: 5' 5\" (1.651 m)            Physical Exam  Vitals and nursing note reviewed. Constitutional:       Appearance: She is well-developed.    HENT:      Head: Normocephalic and atraumatic. Eyes:      Conjunctiva/sclera: Conjunctivae normal.   Cardiovascular:      Rate and Rhythm: Normal rate and regular rhythm. Pulmonary:      Effort: Pulmonary effort is normal. No respiratory distress. Abdominal:      Palpations: Abdomen is soft. Tenderness: There is no abdominal tenderness. Musculoskeletal:         General: No deformity. Normal range of motion. Cervical back: Neck supple. Right lower leg: Edema present. Left lower leg: Edema present. Skin:     General: Skin is warm and dry. Neurological:      Mental Status: She is alert and oriented to person, place, and time. Psychiatric:         Behavior: Behavior normal.         Thought Content: Thought content normal.        MDM  Number of Diagnoses or Management Options  Dependent edema  Diagnosis management comments: Dependent edema, acute renal insufficiency/nephrotic syndrome, lyphedema, pregnacy, CHF/hypertension/acs, chronic venous disease, medication side effect, pulmonary hypertension, liver disease, myxedema/thyroid disease. Less likely: Cellulitis (skin changes not consistent with cellulitis), pelvic mass (no abdominal/pelvic pain). Presentation and exam not consistent with DVT        ED Course as of 07/28/22 2122 Fri Jul 22, 2022   0213 EKG done at 109: Sinus bradycardia with sinus arrhythmia, rate 51 ST morphology unchanged compared with EKG from 9 years ago, on 7/11/2013. [SS]      ED Course User Index  [SS] Chace Cesar MD       Procedures    LABORATORY TESTS:  No results found for this or any previous visit (from the past 12 hour(s)). IMAGING RESULTS:  XR CHEST PORT   Final Result   No acute findings. MEDICATIONS GIVEN:  Medications - No data to display    IMPRESSION:  1. Dependent edema        PLAN:  1. Discharge Medication List as of 7/22/2022  2:31 AM        2.    Follow-up Information       Follow up With Specialties Details [address], [address], NP Nurse Practitioner   [address] [address]  [phone]      [facility] EMERGENCY DEPT Emergency Medicine  As needed, If symptoms worsen [address] 61          Return to ED if worse

## 2022-07-22 NOTE — ED TRIAGE NOTES
Patient presents to the ED with c/o bilateral leg swelling. Denies trauma. Denies being on her feet a lot. Denies pain. Reports a headache. Pt has pitting edema in lower extremities.

## 2022-07-22 NOTE — ED NOTES
Patient is AOx4. Respirations are even and unlabored. Skin is warm and dry. Patient to ED for bilateral lower leg swelling x2-3 days. Patient denies any hx of CHF or other cardiac history. Bed in lowest locked position. Call bell within reach. Assessment complete, patient updated on plan of care. Emergency Department Nursing Plan of Care       The Nursing Plan of Care is developed from the Nursing assessment and Emergency Department Attending provider initial evaluation. The plan of care may be reviewed in the ED Provider note.     The Plan of Care was developed with the following considerations:   Patient / Family readiness to learn indicated by:verbalized understanding  Persons(s) to be included in education: patient  Barriers to Learning/Limitations:No    Signed     Edwige Richards RN    7/22/2022   2:29 AM

## 2022-07-23 LAB
ATRIAL RATE: 51 BPM
CALCULATED P AXIS, ECG09: 51 DEGREES
CALCULATED R AXIS, ECG10: 64 DEGREES
CALCULATED T AXIS, ECG11: 50 DEGREES
DIAGNOSIS, 93000: NORMAL
P-R INTERVAL, ECG05: 158 MS
Q-T INTERVAL, ECG07: 418 MS
QRS DURATION, ECG06: 84 MS
QTC CALCULATION (BEZET), ECG08: 385 MS
VENTRICULAR RATE, ECG03: 51 BPM

## 2022-08-26 ENCOUNTER — NURSE TRIAGE (OUTPATIENT)
Dept: OTHER | Facility: CLINIC | Age: 37
End: 2022-08-26

## 2022-08-26 NOTE — TELEPHONE ENCOUNTER
Received call from Sabine at [facility] with Red Flag Complaint. Subjective: Caller states \"lower leg swelling and tingling\"     Current Symptoms: lower leg swelling and tingling was seen in er in July for swelling to mid calf and needs to see pcp , has asthma and her normal sob , no cp ,hurts more with walking is better than in July and when in the er was told heart rate too low to give a water pil to her    Onset: 1 month ago; gradual    Associated Symptoms: NA    Pain Severity: 8/10; pain; moderate    Temperature: none     What has been tried: elevating/er    LMP: depo Pregnant: NA    Recommended disposition: See PCP within 3 Days    Care advice provided, patient verbalizes understanding; denies any other questions or concerns; instructed to call back for any new or worsening symptoms. Patient/Caller agrees with recommended disposition; writer provided warm transfer to addison at [facility] for appointment scheduling    Attention Provider: Thank you for allowing me to participate in the care of your patient. The patient was connected to triage in response to information provided to the [facility].   Please do not respond through this encounter as the response is not directed to a   Reason for Disposition   [1] MILD swelling of both ankles (i.e., pedal edema) AND [2] new-onset or worsening    Protocols used: Leg Swelling and Edema-ADULT-AH

## 2022-09-09 ENCOUNTER — OFFICE VISIT (OUTPATIENT)
Dept: INTERNAL MEDICINE CLINIC | Age: 37
End: 2022-09-09
Payer: COMMERCIAL

## 2022-09-09 VITALS
HEART RATE: 67 BPM | OXYGEN SATURATION: 100 % | BODY MASS INDEX: 28.32 KG/M2 | TEMPERATURE: 97.5 F | HEIGHT: 65 IN | DIASTOLIC BLOOD PRESSURE: 80 MMHG | RESPIRATION RATE: 18 BRPM | WEIGHT: 170 LBS | SYSTOLIC BLOOD PRESSURE: 114 MMHG

## 2022-09-09 DIAGNOSIS — M79.671 PAIN IN BOTH FEET: ICD-10-CM

## 2022-09-09 DIAGNOSIS — M79.672 PAIN IN BOTH FEET: ICD-10-CM

## 2022-09-09 DIAGNOSIS — R60.0 BILATERAL EDEMA OF LOWER EXTREMITY: Primary | ICD-10-CM

## 2022-09-09 PROBLEM — J45.909 REACTIVE AIRWAY DISEASE WITHOUT COMPLICATION: Status: ACTIVE | Noted: 2022-09-09

## 2022-09-09 PROCEDURE — 99213 OFFICE O/P EST LOW 20 MIN: CPT | Performed by: NURSE PRACTITIONER

## 2022-09-09 RX ORDER — CLONIDINE HYDROCHLORIDE 0.2 MG/1
TABLET ORAL
COMMUNITY
End: 2022-09-09 | Stop reason: ALTCHOICE

## 2022-09-09 RX ORDER — MEDROXYPROGESTERONE ACETATE 150 MG/ML
INJECTION, SUSPENSION INTRAMUSCULAR
COMMUNITY
Start: 2022-08-01

## 2022-09-09 RX ORDER — FUROSEMIDE 20 MG/1
20 TABLET ORAL DAILY
Qty: 15 TABLET | Refills: 0 | Status: SHIPPED | OUTPATIENT
Start: 2022-09-09 | End: 2022-09-28 | Stop reason: SDUPTHER

## 2022-09-09 RX ORDER — OXCARBAZEPINE 600 MG/1
TABLET, FILM COATED ORAL
COMMUNITY
Start: 2022-08-30

## 2022-09-09 RX ORDER — POTASSIUM CHLORIDE 20 MEQ/1
20 TABLET, EXTENDED RELEASE ORAL DAILY
Qty: 15 TABLET | Refills: 0 | Status: SHIPPED | OUTPATIENT
Start: 2022-09-09 | End: 2022-09-28 | Stop reason: SDUPTHER

## 2022-09-09 NOTE — PATIENT INSTRUCTIONS
Elevate legs above the level of your heart while at rest to help reduce leg swelling. Also use compression stockings, available in medical supply and drug stores, to reduce swelling. Wear when you are standing or on your feet for long periods of time OR overnight if you are NOT active for the day.

## 2022-09-09 NOTE — PROGRESS NOTES
Caden Lugo (: 1985) is a 40 y.o. female, established patient, here for evaluation of the following chief complaint(s):  Swelling (Of ankles, both are swollen but mainly the left,  pt states she has tingling.)       ASSESSMENT/PLAN:  Below is the assessment and plan developed based on review of pertinent history, physical exam, labs, studies, and medications. 1. Bilateral edema of lower extremity  -     furosemide (LASIX) 20 mg tablet; Take 1 Tablet by mouth daily. Indications: visible water retention, Normal, Disp-15 Tablet, R-0  -     potassium chloride (K-DUR, KLOR-CON M20) 20 mEq tablet; Take 1 Tablet by mouth daily. With diuretic (Hydrochlorthiazide or Lasix)  Indications: prevention of low potassium in the blood, Normal, Disp-15 Tablet, R-0  2. Pain in both feet      Return in about 4 weeks (around 10/7/2022) for OV- Edema fu, med start. Pt asked to complete follow by next visit: follow low salt diet, compression stocking use and lasix x 2 weeks. SUBJECTIVE/OBJECTIVE:  HPI    Pt is here for ER/UC follow-up on  for leg swelling. Diagnosed with edema. Was given advice to elevate legs, no meds or dietary change. Instructed to f/u with PCP/specialty. Reports feeling SAME AS when in ER, but has tingling pain in feet at times. No diuretics since advised her HR was too low in ER. Has NOT had in the past. No coughing, CP, or worsening SOB; has RAD.         Review of Systems  Constitutional: negative for fevers, chills, anorexia and weight loss  Respiratory:  negative for cough, hemoptysis, dyspnea, and wheezing  CV:   negative for chest pain, palpitations  GI:   negative for nausea, vomiting, diarrhea, abdominal pain, and melena  Endo:               negative for polyuria,polydipsia,polyphagia, and heat intolerance  Genitourinary: negative for frequency, urgency, dysuria, retention, and hematuria  Integument:  negative for rash, ulcerations, and pruritus  Hematologic:  negative for easy bruising and bleeding  Musculoskel: negative for arthralgias, muscle weakness,and joint pain/swelling  Neurological:  negative for headaches, dizziness, vertigo,and memory/gait problems  Behavl/Psych: negative for feelings of anxiety, depression, suicide, and mood changes    Visit Vitals  /80 (BP 1 Location: Right upper arm, BP Patient Position: Sitting, BP Cuff Size: Large adult)   Pulse 67   Temp 97.5 °F (36.4 °C) (Temporal)   Resp 18   Ht 5' 5\" (1.651 m)   Wt 170 lb (77.1 kg)   LMP 09/04/2022 (Exact Date)   SpO2 100%   BMI 28.29 kg/m²       Wt Readings from Last 3 Encounters:   09/09/22 170 lb (77.1 kg)   07/21/22 176 lb (79.8 kg)   04/16/22 176 lb 8 oz (80.1 kg)         Physical Exam:   General appearance - alert, well appearing, and in no distress. Mental status - A/O x 4,normal mood and affect. Chest - CTA. Symmetric chest rise. No wheezing. No distress. Heart - Normal rate & rhythm. Normal S1 & S2. No MGR. Abdomen- Soft, round. Non-distended, NT. No pulsatile masses or hernias. Ext-  No  clubbing or cyanosis. +BLE with foot and ankle edema, 1+  Skin-Warm and dry. No hyperpigmentation, ulcerations, or suspicious lesions. Neuro - Normal speech, no focal findings or movement disorder. Normal strength, gait, and muscle tone.      Results for orders placed or performed during the hospital encounter of 07/22/22   TROPONIN-HIGH SENSITIVITY   Result Value Ref Range    Troponin-High Sensitivity 5 0 - 51 ng/L   NT-PRO BNP   Result Value Ref Range    NT pro-BNP 36 0 - 919 PG/ML   METABOLIC PANEL, BASIC   Result Value Ref Range    Sodium 139 136 - 145 mmol/L    Potassium 3.7 3.5 - 5.1 mmol/L    Chloride 104 97 - 108 mmol/L    CO2 26 21 - 32 mmol/L    Anion gap 9 5 - 15 mmol/L    Glucose 101 (H) 65 - 100 mg/dL    BUN 10 6 - 20 MG/DL    Creatinine 1.08 (H) 0.55 - 1.02 MG/DL    BUN/Creatinine ratio 9 (L) 12 - 20      GFR est AA >60 >60 ml/min/1.73m2    GFR est non-AA 57 (L) >60 ml/min/1.73m2    Calcium 8.5 8.5 - 10.1 MG/DL   CBC WITH AUTOMATED DIFF   Result Value Ref Range    WBC 7.3 3.6 - 11.0 K/uL    RBC 4.54 3.80 - 5.20 M/uL    HGB 13.0 11.5 - 16.0 g/dL    HCT 40.9 35.0 - 47.0 %    MCV 90.1 80.0 - 99.0 FL    MCH 28.6 26.0 - 34.0 PG    MCHC 31.8 30.0 - 36.5 g/dL    RDW 12.5 11.5 - 14.5 %    PLATELET 552 463 - 714 K/uL    MPV 9.5 8.9 - 12.9 FL    NRBC 0.0 0  WBC    ABSOLUTE NRBC 0.00 0.00 - 0.01 K/uL    NEUTROPHILS 48 32 - 75 %    LYMPHOCYTES 40 12 - 49 %    MONOCYTES 8 5 - 13 %    EOSINOPHILS 3 0 - 7 %    BASOPHILS 1 0 - 1 %    IMMATURE GRANULOCYTES 0 0.0 - 0.5 %    ABS. NEUTROPHILS 3.5 1.8 - 8.0 K/UL    ABS. LYMPHOCYTES 3.0 0.8 - 3.5 K/UL    ABS. MONOCYTES 0.6 0.0 - 1.0 K/UL    ABS. EOSINOPHILS 0.2 0.0 - 0.4 K/UL    ABS. BASOPHILS 0.1 0.0 - 0.1 K/UL    ABS. IMM.  GRANS. 0.0 0.00 - 0.04 K/UL    DF AUTOMATED     URINALYSIS W/ RFLX MICROSCOPIC   Result Value Ref Range    Color YELLOW/STRAW      Appearance CLEAR CLEAR      Specific gravity 1.025 1.003 - 1.030      pH (UA) 6.5 5.0 - 8.0      Protein TRACE (A) NEG mg/dL    Glucose Negative NEG mg/dL    Ketone TRACE (A) NEG mg/dL    Bilirubin Negative NEG      Blood Negative NEG      Urobilinogen 1.0 0.2 - 1.0 EU/dL    Nitrites Negative NEG      Leukocyte Esterase Negative NEG     URINE MICROSCOPIC ONLY   Result Value Ref Range    WBC 0-4 0 - 4 /hpf    RBC 0-5 0 - 5 /hpf    Epithelial cells FEW FEW /lpf    Bacteria Negative NEG /hpf   HCG URINE, QL. - POC   Result Value Ref Range    Pregnancy test,urine (POC) Negative NEG     EKG, 12 LEAD, INITIAL   Result Value Ref Range    Ventricular Rate 51 BPM    Atrial Rate 51 BPM    P-R Interval 158 ms    QRS Duration 84 ms    Q-T Interval 418 ms    QTC Calculation (Bezet) 385 ms    Calculated P Axis 51 degrees    Calculated R Axis 64 degrees    Calculated T Axis 50 degrees    Diagnosis       Sinus bradycardia with sinus arrhythmia  Otherwise normal ECG  When compared with ECG of 11-JUL-2013 08:50,  No significant change was found  Confirmed by Leeann Taylor MD ([id number]) on 7/23/2022 5:38:02 PM               An electronic signature was used to authenticate this note.   -- Maria G Naranjo NP

## 2022-09-27 DIAGNOSIS — R60.0 BILATERAL EDEMA OF LOWER EXTREMITY: ICD-10-CM

## 2022-09-28 RX ORDER — POTASSIUM CHLORIDE 1500 MG/1
TABLET, EXTENDED RELEASE ORAL
Qty: 15 TABLET | Refills: 0 | Status: SHIPPED | OUTPATIENT
Start: 2022-09-28

## 2022-09-28 RX ORDER — FUROSEMIDE 20 MG/1
TABLET ORAL
Qty: 15 TABLET | Refills: 0 | Status: SHIPPED | OUTPATIENT
Start: 2022-09-28

## 2022-09-30 ENCOUNTER — HOSPITAL ENCOUNTER (EMERGENCY)
Age: 37
Discharge: HOME | End: 2022-09-30
Attending: EMERGENCY MEDICINE
Payer: COMMERCIAL

## 2022-09-30 VITALS
SYSTOLIC BLOOD PRESSURE: 122 MMHG | BODY MASS INDEX: 30.82 KG/M2 | WEIGHT: 185 LBS | HEART RATE: 78 BPM | TEMPERATURE: 98.2 F | HEIGHT: 65 IN | RESPIRATION RATE: 16 BRPM | DIASTOLIC BLOOD PRESSURE: 87 MMHG

## 2022-09-30 DIAGNOSIS — J01.10 ACUTE FRONTAL SINUSITIS, RECURRENCE NOT SPECIFIED: Primary | ICD-10-CM

## 2022-09-30 PROCEDURE — 99283 EMERGENCY DEPT VISIT LOW MDM: CPT

## 2022-09-30 RX ORDER — OXYMETAZOLINE HCL 0.05 %
2 SPRAY, NON-AEROSOL (ML) NASAL 2 TIMES DAILY
Qty: 1 EACH | Refills: 0 | Status: SHIPPED | OUTPATIENT
Start: 2022-09-30 | End: 2022-10-03

## 2022-09-30 RX ORDER — AMOXICILLIN 875 MG/1
875 TABLET, FILM COATED ORAL 2 TIMES DAILY
Qty: 20 TABLET | Refills: 0 | Status: SHIPPED | OUTPATIENT
Start: 2022-09-30 | End: 2022-10-10

## 2022-09-30 NOTE — ED PROVIDER NOTES
EMERGENCY DEPARTMENT HISTORY AND PHYSICAL EXAM          Date: 9/30/2022  Patient Name: Fran Tong    History of Presenting Illness     Chief Complaint   Patient presents with    Headache         History Provided By: Patient    Chief Complaint: headache  Duration: 5 Days  Timing:  Acute  Location: frontal area  Quality: Aching  Severity: 8 out of 10  Modifying Factors: aching  Associated Symptoms: congestion fatigue      HPI: Fran Tong is a 40 y.o. female with a PMH of asthma who presents with frontal sinus headache acute onset 5 days ago. Patient reports congestion and fatigue. Patient states she took a COVID test at home and it was negative. Patient denies ear pain or sore throat cough or wheezing denies abdominal pain nausea vomiting diarrhea. PCP: Shaun Stevenson NP    Current Outpatient Medications   Medication Sig Dispense Refill    oxymetazoline (Afrin, oxymetazoline,) 0.05 % nasal spray 2 Sprays by Both Nostrils route two (2) times a day for 3 days. 1 Each 0    amoxicillin (AMOXIL) 875 mg tablet Take 1 Tablet by mouth two (2) times a day for 10 days. 20 Tablet 0    furosemide (LASIX) 20 mg tablet TAKE 1 TABLET BY MOUTH DAILY FOR VISIBLE WATER RETENTION 15 Tablet 0    Klor-Con M20 20 mEq tablet TAKE 1 TABLET BY MOUTH DAILY. WITH DIURETIC (HYDROCHLORTHIAZIDE OR LASIX) INDICATIONS: PREVENTION OF LOW POTASSIUM IN THE BLOOD 15 Tablet 0    OXcarbazepine (TRILEPTAL) 600 mg tablet TAKE 1 TABLET BY MOUTH TWICE A DAY AS DIRECTED      medroxyPROGESTERone (DEPO-PROVERA) 150 mg/mL syrg INJECT 1 ML BY INTRAMUSCULAR ROUTE EVERY THREE (3) MONTHS. ergocalciferol (ERGOCALCIFEROL) 1,250 mcg (50,000 unit) capsule TAKE 1 CAPSULE BY MOUTH EVERY SEVEN (7) DAYS. 12 Capsule 3    albuterol (PROVENTIL HFA, VENTOLIN HFA, PROAIR HFA) 90 mcg/actuation inhaler Take 1-2 Puffs by inhalation every four (4) hours as needed for Wheezing.  1 Each 0    ALPRAZolam (XANAX) 1 mg tablet          Past History     Past Medical History:  Past Medical History:   Diagnosis Date    Asthma     Back pain     Bipolar 1 disorder (HCC)     Gastrointestinal disorder     IBS    Other ill-defined conditions(799.89)     Frequent Boils    Scoliosis        Past Surgical History:  Past Surgical History:   Procedure Laterality Date    HX TONSILLECTOMY      HX WISDOM TEETH EXTRACTION         Family History:  Family History   Problem Relation Age of Onset    Cancer Neg Hx     Hypertension Neg Hx     Diabetes Neg Hx     Stroke Neg Hx        Social History:  Social History     Tobacco Use    Smoking status: Every Day     Packs/day: 1.00     Years: 2.00     Pack years: 2.00     Types: Cigarettes     Last attempt to quit: 2013     Years since quittin.1    Smokeless tobacco: Never   Vaping Use    Vaping Use: Never used   Substance Use Topics    Alcohol use: No     Alcohol/week: 0.0 standard drinks    Drug use: No       Allergies: Allergies   Allergen Reactions    Naprosyn [Naproxen] Hives and Nausea and Vomiting     Can take ibuprofen         Review of Systems   Review of Systems   Constitutional:  Positive for fatigue. Negative for fever. HENT:  Positive for congestion. Respiratory:  Negative for shortness of breath and wheezing. Cardiovascular:  Negative for chest pain. Gastrointestinal:  Negative for abdominal pain. Musculoskeletal:  Negative for arthralgias, myalgias, neck pain and neck stiffness. Skin:  Negative for pallor and rash. Neurological:  Positive for headaches. Negative for dizziness and tremors. All other systems reviewed and are negative. Physical Exam     Vitals:    22 0920   BP: 122/87   Pulse: 78   Resp: 16   Temp: 98.2 °F (36.8 °C)   Weight: 83.9 kg (185 lb)   Height: 5' 5\" (1.651 m)     Physical Exam  Vitals and nursing note reviewed. Constitutional:       General: She is not in acute distress. Appearance: Normal appearance. She is well-developed. HENT:      Head: Normocephalic and atraumatic. Right Ear: External ear normal.      Left Ear: External ear normal.      Nose: Congestion present. Comments: Frontal sinus tenderness     Mouth/Throat:      Mouth: Mucous membranes are moist.   Eyes:      Conjunctiva/sclera: Conjunctivae normal.   Cardiovascular:      Rate and Rhythm: Normal rate and regular rhythm. Heart sounds: Normal heart sounds. Pulmonary:      Effort: Pulmonary effort is normal. No respiratory distress. Breath sounds: Normal breath sounds. No wheezing. Abdominal:      Palpations: Abdomen is soft. Tenderness: There is no abdominal tenderness. Musculoskeletal:         General: Normal range of motion. Cervical back: Normal range of motion and neck supple. Lymphadenopathy:      Cervical: No cervical adenopathy. Skin:     General: Skin is warm and dry. Findings: No rash. Neurological:      Mental Status: She is alert and oriented to person, place, and time. Cranial Nerves: No cranial nerve deficit. Coordination: Coordination normal.   Psychiatric:         Behavior: Behavior normal.         Thought Content: Thought content normal.         Judgment: Judgment normal.             Medical Decision Making   I am the first provider for this patient. I reviewed the vital signs, available nursing notes, past medical history, past surgical history, family history and social history. Vital Signs-Reviewed the patient's vital signs. Records Reviewed: Nursing Notes    Provider Notes (Medical Decision Making):   DDX sinusitis  allergic rhinitis upper respiratory infection            Procedures:  Procedures    Diagnostic Study Results     Labs -   No results found for this or any previous visit (from the past 15 hour(s)).     Radiologic Studies -   No orders to display     CT Results  (Last 48 hours)      None          CXR Results  (Last 48 hours)      None                Disposition:  home    DISCHARGE NOTE:     I have discussed with patient their diagnosis, treatment, and follow up plan. The patient agrees to follow up as outlined in discharge paperwork and also to return to the ED with any worsening. Tiffany Briscoe NP       Follow-up Information       Follow up With Specialties Details Why Contact Info    Toribio Lanza NP Nurse Practitioner   [facility]  [phone]              Discharge Medication List as of 9/30/2022 10:40 AM        START taking these medications    Details   oxymetazoline (Afrin, oxymetazoline,) 0.05 % nasal spray 2 Sprays by Both Nostrils route two (2) times a day for 3 days. , Normal, Disp-1 Each, R-0      amoxicillin (AMOXIL) 875 mg tablet Take 1 Tablet by mouth two (2) times a day for 10 days. , Normal, Disp-20 Tablet, R-0           CONTINUE these medications which have NOT CHANGED    Details   furosemide (LASIX) 20 mg tablet TAKE 1 TABLET BY MOUTH DAILY FOR VISIBLE WATER RETENTION, Normal, Disp-15 Tablet, R-0      Klor-Con M20 20 mEq tablet TAKE 1 TABLET BY MOUTH DAILY. WITH DIURETIC (HYDROCHLORTHIAZIDE OR LASIX) INDICATIONS: PREVENTION OF LOW POTASSIUM IN THE BLOOD, Normal, Disp-15 Tablet, R-0      OXcarbazepine (TRILEPTAL) 600 mg tablet TAKE 1 TABLET BY MOUTH TWICE A DAY AS DIRECTED, Historical Med      medroxyPROGESTERone (DEPO-PROVERA) 150 mg/mL syrg INJECT 1 ML BY INTRAMUSCULAR ROUTE EVERY THREE (3) MONTHS. , Historical Med      ergocalciferol (ERGOCALCIFEROL) 1,250 mcg (50,000 unit) capsule TAKE 1 CAPSULE BY MOUTH EVERY SEVEN (7) DAYS., Normal, Disp-12 Capsule, R-3      albuterol (PROVENTIL HFA, VENTOLIN HFA, PROAIR HFA) 90 mcg/actuation inhaler Take 1-2 Puffs by inhalation every four (4) hours as needed for Wheezing., Normal, Disp-1 Each, R-0      ALPRAZolam (XANAX) 1 mg tablet Historical Med               Please note that this dictation was completed with Dragon, computer voice recognition software.   Quite often unanticipated grammatical, syntax, homophones, and other interpretive errors are inadvertently transcribed by the computer software. Please disregard these errors. Additionally, please excuse any errors that have escaped final proofreading. Diagnosis     Clinical Impression:   1.  Acute frontal sinusitis, recurrence not specified

## 2022-09-30 NOTE — ED NOTES
Patient is alert and oriented x 4 and in no acute distress at this time. Respirations are at a regular rate, depth, and pattern. Patient updated on plan of care and has no questions or concerns at this time. Call bell within reach. Will continue to monitor. Please reference nursing assessment. Emergency Department Nursing Plan of Care       The Nursing Plan of Care is developed from the Nursing assessment and Emergency Department Attending provider initial evaluation. The plan of care may be reviewed in the ED Provider note.     The Plan of Care was developed with the following considerations:   Patient / Family readiness to learn indicated by:verbalized understanding and successful return demonstration  Persons(s) to be included in education: patient  Barriers to Learning/Limitations:No    Signed     Dasha Carter RN    9/30/2022   10:00 AM

## 2022-09-30 NOTE — ED TRIAGE NOTES
Patient c/o headache, congestion, and general fatigue since Sunday. Patient denies fever, cough, or sore throat.

## 2022-12-08 ENCOUNTER — CLINICAL SUPPORT (OUTPATIENT)
Dept: INTERNAL MEDICINE CLINIC | Age: 37
End: 2022-12-08

## 2022-12-08 DIAGNOSIS — Z30.42 ENCOUNTER FOR DEPO-PROVERA CONTRACEPTION: Primary | ICD-10-CM

## 2022-12-08 RX ORDER — MEDROXYPROGESTERONE ACETATE 150 MG/ML
150 INJECTION, SUSPENSION INTRAMUSCULAR ONCE
Qty: 1 ML | Refills: 0 | Status: SHIPPED | COMMUNITY
Start: 2022-12-08 | End: 2022-12-08

## 2022-12-23 DIAGNOSIS — Z30.42 ENCOUNTER FOR SURVEILLANCE OF INJECTABLE CONTRACEPTIVE: ICD-10-CM

## 2022-12-23 DIAGNOSIS — R60.0 BILATERAL EDEMA OF LOWER EXTREMITY: ICD-10-CM

## 2022-12-23 DIAGNOSIS — N92.6 IRREGULAR MENSTRUATION, UNSPECIFIED: ICD-10-CM

## 2022-12-23 RX ORDER — MEDROXYPROGESTERONE ACETATE 150 MG/ML
INJECTION, SUSPENSION INTRAMUSCULAR
Qty: 1 ML | Refills: 3 | Status: SHIPPED | OUTPATIENT
Start: 2022-12-23

## 2022-12-23 RX ORDER — FUROSEMIDE 20 MG/1
TABLET ORAL
Qty: 15 TABLET | Refills: 0 | Status: SHIPPED | OUTPATIENT
Start: 2022-12-23

## 2023-01-21 ENCOUNTER — HOSPITAL ENCOUNTER (EMERGENCY)
Age: 38
Discharge: HOME | End: 2023-01-21
Attending: EMERGENCY MEDICINE
Payer: COMMERCIAL

## 2023-01-21 VITALS
RESPIRATION RATE: 18 BRPM | BODY MASS INDEX: 28.32 KG/M2 | WEIGHT: 170 LBS | TEMPERATURE: 98.5 F | HEIGHT: 65 IN | DIASTOLIC BLOOD PRESSURE: 87 MMHG | SYSTOLIC BLOOD PRESSURE: 122 MMHG | OXYGEN SATURATION: 99 % | HEART RATE: 99 BPM
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
## 2023-01-21 PROCEDURE — 99283 EMERGENCY DEPT VISIT LOW MDM: CPT

## 2023-01-21 RX ORDER — DIPHENHYDRAMINE HCL 25 MG
25 CAPSULE ORAL
Qty: 12 CAPSULE | Refills: 0 | Status: SHIPPED | OUTPATIENT
Start: 2023-01-21 | End: 2023-01-24

## 2023-01-21 RX ORDER — PREDNISONE 20 MG/1
60 TABLET ORAL DAILY
Qty: 15 TABLET | Refills: 0 | Status: SHIPPED | OUTPATIENT
Start: 2023-01-21 | End: 2023-01-26

## 2023-01-21 RX ORDER — HYDROCORTISONE 1 %
CREAM (GRAM) TOPICAL 2 TIMES DAILY
Qty: 30 G | Refills: 0 | Status: SHIPPED | OUTPATIENT
Start: 2023-01-21

## 2023-01-21 NOTE — ED NOTES
Discharge and medication instructions reviewed with the patient. Patient cautioned not to drive or operate machinery while taking medications.   Patient discharged ambulatory with a steady gait

## 2023-01-21 NOTE — ED NOTES
Emergency Department Nursing Plan of Care       The Nursing Plan of Care is developed from the Nursing assessment and Emergency Department Attending provider initial evaluation. The plan of care may be reviewed in the ED Provider note.     The Plan of Care was developed with the following considerations:   Patient / Family readiness to learn indicated by:verbalized understanding  Persons(s) to be included in education: patient  Barriers to Learning/Limitations:No    Signed     Mireya Randall RN    1/21/2023   7:19 AM

## 2023-01-21 NOTE — ED PROVIDER NOTES
EMERGENCY DEPARTMENT HISTORY AND PHYSICAL EXAM      Date: 1/21/2023  Patient Name: Giana Oliver    History of Presenting Illness     Chief Complaint   Patient presents with    Hives       History Provided By: Patient    HPI: Giana Overall, 40 y.o. female presents to the ED with cc of bug bites to the legs for the past 2 days. Patient complains her legs are itching. Denies any other complaints. There are no other associated symptoms, patient concerns, or physical findings at this time. I reviewed the vital signs, available nursing notes, past medical history, past surgical history, family history and social history. Vital Signs:  Patient Vitals for the past 12 hrs:   Temp Pulse Resp BP SpO2   01/21/23 0711 98.5 °F (36.9 °C) 99 18 122/87 99 %     Vital signs reviewed. Current Medications:  No current facility-administered medications on file prior to encounter. Current Outpatient Medications on File Prior to Encounter   Medication Sig Dispense Refill    medroxyPROGESTERone (DEPO-PROVERA) 150 mg/mL syrg INJECT 1 ML BY INTRAMUSCULAR ROUTE EVERY THREE (3) MONTHS. 1 mL 3    furosemide (LASIX) 20 mg tablet TAKE 1 TABLET BY MOUTH DAILY FOR VISIBLE WATER RETENTION 15 Tablet 0    Klor-Con M20 20 mEq tablet TAKE 1 TABLET BY MOUTH DAILY. WITH DIURETIC (HYDROCHLORTHIAZIDE OR LASIX) INDICATIONS: PREVENTION OF LOW POTASSIUM IN THE BLOOD 15 Tablet 0    OXcarbazepine (TRILEPTAL) 600 mg tablet TAKE 1 TABLET BY MOUTH TWICE A DAY AS DIRECTED      ergocalciferol (ERGOCALCIFEROL) 1,250 mcg (50,000 unit) capsule TAKE 1 CAPSULE BY MOUTH EVERY SEVEN (7) DAYS. 12 Capsule 3    albuterol (PROVENTIL HFA, VENTOLIN HFA, PROAIR HFA) 90 mcg/actuation inhaler Take 1-2 Puffs by inhalation every four (4) hours as needed for Wheezing.  1 Each 0    ALPRAZolam (XANAX) 1 mg tablet          Past History     Past Medical History:  Past Medical History:   Diagnosis Date    Asthma     Back pain     Bipolar 1 disorder ([facility] [address] Gastrointestinal disorder     IBS    Other ill-defined conditions(799.89)     Frequent Boils    Scoliosis        Past Surgical History:  Past Surgical History:   Procedure Laterality Date    HX TONSILLECTOMY      HX WISDOM TEETH EXTRACTION         Family History:  Family History   Problem Relation Age of Onset    Cancer Neg Hx     Hypertension Neg Hx     Diabetes Neg Hx     Stroke Neg Hx        Social History:  Social History     Tobacco Use    Smoking status: Every Day     Packs/day: 1.00     Years: 2.00     Pack years: 2.00     Types: Cigarettes     Last attempt to quit: 2013     Years since quittin.4    Smokeless tobacco: Never   Vaping Use    Vaping Use: Never used   Substance Use Topics    Alcohol use: No     Alcohol/week: 0.0 standard drinks    Drug use: No       Allergies: Allergies   Allergen Reactions    Naprosyn [Naproxen] Hives and Nausea and Vomiting     Can take ibuprofen         Review of Systems   Review of Systems   Constitutional:  Negative for fever. HENT:  Negative for sore throat. Eyes:  Negative for photophobia and redness. Respiratory:  Negative for shortness of breath and wheezing. Cardiovascular:  Negative for chest pain and leg swelling. Gastrointestinal:  Negative for abdominal pain, blood in stool, nausea and vomiting. Genitourinary:  Negative for difficulty urinating, dysuria, hematuria, menstrual problem and vaginal bleeding. Musculoskeletal:  Negative for back pain and joint swelling. Skin:  Positive for rash. Neurological:  Negative for dizziness, seizures, syncope, speech difficulty, weakness, numbness and headaches. Hematological:  Negative for adenopathy. Psychiatric/Behavioral:  Negative for agitation, confusion and suicidal ideas. The patient is not nervous/anxious. All other systems reviewed and are negative. Physical Exam   Physical Exam  Vitals and nursing note reviewed. Exam conducted with a chaperone present.    Constitutional: General: She is not in acute distress. Appearance: Normal appearance. She is well-developed. HENT:      Head: Normocephalic and atraumatic. Mouth/Throat:      Pharynx: No oropharyngeal exudate. Eyes:      General:         Right eye: No discharge. Left eye: No discharge. Extraocular Movements: Extraocular movements intact. Conjunctiva/sclera: Conjunctivae normal.      Pupils: Pupils are equal, round, and reactive to light. Neck:      Vascular: No JVD. Cardiovascular:      Rate and Rhythm: Normal rate and regular rhythm. Heart sounds: Normal heart sounds. Pulmonary:      Effort: Pulmonary effort is normal. No respiratory distress. Breath sounds: Normal breath sounds. No wheezing. Abdominal:      General: Bowel sounds are normal. There is no distension. Palpations: Abdomen is soft. Tenderness: There is no abdominal tenderness. There is no guarding or rebound. Musculoskeletal:         General: No tenderness. Normal range of motion. Cervical back: Normal range of motion and neck supple. Lymphadenopathy:      Cervical: No cervical adenopathy. Skin:     General: Skin is warm and dry. Findings: Rash present. Comments: Multiple small erythematous bumps on both legs. Neurological:      Mental Status: She is alert and oriented to person, place, and time. Cranial Nerves: No cranial nerve deficit. Deep Tendon Reflexes: Reflexes are normal and symmetric. Psychiatric:         Behavior: Behavior normal.       Emergency Department Course   ED Course:  Initial assessment performed. The patient's complaints have been discussed, and they are in agreement with the care plan formulated and outlined with them. I have encouraged them to ask questions as they arise throughout their visit. EKG interpretation: (Preliminary)    Medical Decision Making:  Contact dermatitis, bug bite, allergic reaction.     Critical Care Time:      Procedure:      Progress note:   Time:    Disposition:  DISCHARGED at 7:45 am,  I reviewed exam findings, diagnostic results, and clinical impression with patient. Counseled patient on diagnosis and care plan. Encouraged patient to ask questions and discussed need for follow up with primary care and to return to ED precautions. Patient expresses understanding at this time. I have reviewed discharge instructions with the patient and/or family/caregiver who verbalized understanding. The patient has been re-evaluated and is ready for discharge. Discharge instructions have been provided and explained to the patient. Ready for discharge. DISCHARGE PLAN:  1. Current Discharge Medication List        2. Follow-up Information    None       3. Return to ED if current symptoms worsen or new symptoms arise. 4. Follow up with Neto Patricia NP in 3-5 days. Diagnosis     Clinical Impression: No diagnosis found.

## 2023-02-20 DIAGNOSIS — Z30.42 ENCOUNTER FOR SURVEILLANCE OF INJECTABLE CONTRACEPTIVE: ICD-10-CM

## 2023-02-20 DIAGNOSIS — N92.6 IRREGULAR MENSTRUATION, UNSPECIFIED: ICD-10-CM

## 2023-02-20 RX ORDER — MEDROXYPROGESTERONE ACETATE 150 MG/ML
INJECTION, SUSPENSION INTRAMUSCULAR
Qty: 1 ML | Refills: 3
Start: 2023-02-20 | End: 2023-02-22 | Stop reason: SDUPTHER

## 2023-02-22 DIAGNOSIS — N92.6 IRREGULAR MENSTRUATION, UNSPECIFIED: ICD-10-CM

## 2023-02-22 DIAGNOSIS — Z30.42 ENCOUNTER FOR SURVEILLANCE OF INJECTABLE CONTRACEPTIVE: ICD-10-CM

## 2023-02-22 RX ORDER — MEDROXYPROGESTERONE ACETATE 150 MG/ML
INJECTION, SUSPENSION INTRAMUSCULAR
Qty: 1 ML | Refills: 3
Start: 2023-02-22 | End: 2023-02-24 | Stop reason: SDUPTHER

## 2023-02-24 ENCOUNTER — HOSPITAL ENCOUNTER (EMERGENCY)
Age: 38
Discharge: HOME | End: 2023-02-24
Attending: EMERGENCY MEDICINE
Payer: COMMERCIAL

## 2023-02-24 ENCOUNTER — CLINICAL SUPPORT (OUTPATIENT)
Dept: INTERNAL MEDICINE CLINIC | Age: 38
End: 2023-02-24

## 2023-02-24 VITALS
HEART RATE: 75 BPM | TEMPERATURE: 98.5 F | DIASTOLIC BLOOD PRESSURE: 77 MMHG | OXYGEN SATURATION: 99 % | SYSTOLIC BLOOD PRESSURE: 127 MMHG | RESPIRATION RATE: 16 BRPM

## 2023-02-24 DIAGNOSIS — N92.6 IRREGULAR MENSTRUATION, UNSPECIFIED: ICD-10-CM

## 2023-02-24 DIAGNOSIS — R30.0 DYSURIA: Primary | ICD-10-CM

## 2023-02-24 DIAGNOSIS — Z30.42 ENCOUNTER FOR SURVEILLANCE OF INJECTABLE CONTRACEPTIVE: ICD-10-CM

## 2023-02-24 DIAGNOSIS — Z30.42 ENCOUNTER FOR DEPO-PROVERA CONTRACEPTION: Primary | ICD-10-CM

## 2023-02-24 LAB
APPEARANCE UR: ABNORMAL
BACTERIA URNS QL MICRO: ABNORMAL /HPF
BILIRUB UR QL: NEGATIVE
COLOR UR: ABNORMAL
EPITH CASTS URNS QL MICRO: ABNORMAL /LPF
GLUCOSE UR STRIP.AUTO-MCNC: NEGATIVE MG/DL
HGB UR QL STRIP: NEGATIVE
KETONES UR QL STRIP.AUTO: ABNORMAL MG/DL
LEUKOCYTE ESTERASE UR QL STRIP.AUTO: ABNORMAL
NITRITE UR QL STRIP.AUTO: NEGATIVE
PH UR STRIP: 8 (ref 5–8)
PROT UR STRIP-MCNC: ABNORMAL MG/DL
RBC #/AREA URNS HPF: ABNORMAL /HPF (ref 0–5)
SP GR UR REFRACTOMETRY: 1.02
UA: UC IF INDICATED,UAUC: ABNORMAL
UROBILINOGEN UR QL STRIP.AUTO: 1 EU/DL (ref 0.2–1)
WBC URNS QL MICRO: ABNORMAL /HPF (ref 0–4)

## 2023-02-24 PROCEDURE — 99283 EMERGENCY DEPT VISIT LOW MDM: CPT

## 2023-02-24 PROCEDURE — 81001 URINALYSIS AUTO W/SCOPE: CPT

## 2023-02-24 RX ORDER — MEDROXYPROGESTERONE ACETATE 150 MG/ML
INJECTION, SUSPENSION INTRAMUSCULAR
Qty: 1 ML | Refills: 3 | Status: SHIPPED | OUTPATIENT
Start: 2023-02-24

## 2023-02-24 RX ORDER — MEDROXYPROGESTERONE ACETATE 150 MG/ML
INJECTION, SUSPENSION INTRAMUSCULAR
Qty: 1 ML | Refills: 3 | Status: CANCELLED | OUTPATIENT
Start: 2023-02-24

## 2023-02-24 NOTE — ED TRIAGE NOTES
Pt arrives to the ED AAOX4 with a c/c of dysuria, urinary frequency, suprapubic pain, and back pain onset 3 days ago. Pt reports taking Azo to improve symptoms. Pt is noted in stable condition and in no acute distress.

## 2023-02-24 NOTE — ED PROVIDER NOTES
[facility] EMERGENCY DEPT  EMERGENCY DEPARTMENT ENCOUNTER       Pt Name: Emilia Doll  MRN: [id number]  [place] 1985  Date of evaluation: 2/24/2023  Provider: Vikki Sever, NP   PCP: Rose Ritter NP  Note Started: 2:43 PM 2/24/23     ED attending involment: I have seen and evaluated the patient. My supervision physician was available for consultation. CHIEF COMPLAINT       Chief Complaint   Patient presents with    Urinary Pain        HISTORY OF PRESENT ILLNESS: 1 or more elements      History From: Patient  HPI Limitations : None     Emilia Doll is a 40 y.o. female who presents with urinary pain. Onset 3 days ago. She reports urinary pain and urinary frequency. She denies vaginal discharge, itching or odor. She reports taking AZO for her sx. She is not concerned for pregnancy stating she receives depo provera with her last injection today prior to arrival. Denies fever, chills, back pain. S     Nursing Notes were all reviewed and agreed with or any disagreements were addressed in the HPI. REVIEW OF SYSTEMS      Review of Systems   Constitutional:  Negative for chills and fever. Respiratory:  Negative for cough and shortness of breath. Gastrointestinal:  Positive for abdominal pain. Negative for nausea and vomiting. Genitourinary:  Positive for dysuria and vaginal discharge. Negative for frequency, genital sores, hematuria, menstrual problem, pelvic pain, urgency, vaginal bleeding and vaginal pain. Musculoskeletal:  Positive for back pain. Skin:  Negative for rash. Neurological:  Negative for dizziness and headaches. All other systems reviewed and are negative. Positives and Pertinent negatives as per HPI.     PAST HISTORY     Past Medical History:  Past Medical History:   Diagnosis Date    Asthma     Back pain     Bipolar 1 disorder (HCC)     Gastrointestinal disorder     IBS    Other ill-defined conditions(819.89)     Frequent Boils    Scoliosis        Past Surgical History:  Past Surgical History:   Procedure Laterality Date    HX TONSILLECTOMY      HX WISDOM TEETH EXTRACTION         Family History:  Family History   Problem Relation Age of Onset    Cancer Neg Hx     Hypertension Neg Hx     Diabetes Neg Hx     Stroke Neg Hx        Social History:  Social History     Tobacco Use    Smoking status: Every Day     Packs/day: 1.00     Years: 2.00     Pack years: 2.00     Types: Cigarettes     Last attempt to quit: 2013     Years since quittin.5    Smokeless tobacco: Never   Vaping Use    Vaping Use: Never used   Substance Use Topics    Alcohol use: No     Alcohol/week: 0.0 standard drinks    Drug use: No       Allergies: Allergies   Allergen Reactions    Naprosyn [Naproxen] Hives and Nausea and Vomiting     Can take ibuprofen       CURRENT MEDICATIONS      Discharge Medication List as of 2023  3:15 PM        CONTINUE these medications which have NOT CHANGED    Details   medroxyPROGESTERone (DEPO-PROVERA) 150 mg/mL syrg INJECT 1 ML BY INTRAMUSCULAR ROUTE EVERY THREE (3) MONTHS., Normal, Disp-1 mL, R-3      hydrocortisone (Cortisone) 1 % topical cream Apply  to affected area two (2) times a day. use thin layer, Normal, Disp-30 g, R-0      furosemide (LASIX) 20 mg tablet TAKE 1 TABLET BY MOUTH DAILY FOR VISIBLE WATER RETENTION, Normal, Disp-15 Tablet, R-0      Klor-Con M20 20 mEq tablet TAKE 1 TABLET BY MOUTH DAILY.  WITH DIURETIC (HYDROCHLORTHIAZIDE OR LASIX) INDICATIONS: PREVENTION OF LOW POTASSIUM IN THE BLOOD, Normal, Disp-15 Tablet, R-0      OXcarbazepine (TRILEPTAL) 600 mg tablet TAKE 1 TABLET BY MOUTH TWICE A DAY AS DIRECTED, Historical Med      ergocalciferol (ERGOCALCIFEROL) 1,250 mcg (50,000 unit) capsule TAKE 1 CAPSULE BY MOUTH EVERY SEVEN (7) DAYS., Normal, Disp-12 Capsule, R-3      albuterol (PROVENTIL HFA, VENTOLIN HFA, PROAIR HFA) 90 mcg/actuation inhaler Take 1-2 Puffs by inhalation every four (4) hours as needed for Wheezing., Normal, Disp-1 Each, R-0 ALPRAZolam (XANAX) 1 mg tablet Historical Med             PHYSICAL EXAM      ED Triage Vitals   ED Encounter Vitals Group      BP 02/24/23 1331 127/77      Pulse (Heart Rate) 02/24/23 1331 81      Resp Rate 02/24/23 1331 16      Temp 02/24/23 1331 98.5 °F (36.9 °C)      Temp src --       O2 Sat (%) 02/24/23 1435 99 %      Weight --       Height --         Physical Exam  Vitals and nursing note reviewed. Constitutional:       General: She is not in acute distress. Appearance: She is well-developed. She is not ill-appearing. HENT:      Head: Normocephalic and atraumatic. Right Ear: Tympanic membrane and ear canal normal.      Left Ear: Tympanic membrane and ear canal normal.      Nose: Nose normal.      Mouth/Throat:      Mouth: Mucous membranes are moist.      Pharynx: Oropharynx is clear. Eyes:      Extraocular Movements: Extraocular movements intact. Conjunctiva/sclera: Conjunctivae normal.      Pupils: Pupils are equal, round, and reactive to light. Cardiovascular:      Rate and Rhythm: Normal rate and regular rhythm. Pulses: Normal pulses. Heart sounds: Normal heart sounds. Pulmonary:      Effort: Pulmonary effort is normal.      Breath sounds: Normal breath sounds. Abdominal:      General: Bowel sounds are normal. There is no distension. Palpations: Abdomen is soft. There is no mass. Tenderness: There is no abdominal tenderness. There is no right CVA tenderness, left CVA tenderness or guarding. Musculoskeletal:         General: Normal range of motion. Cervical back: Normal range of motion and neck supple. Skin:     General: Skin is warm and dry. Neurological:      Mental Status: She is alert and oriented to person, place, and time. GCS: GCS eye subscore is 4. GCS verbal subscore is 5. GCS motor subscore is 6.         DIAGNOSTIC RESULTS   LABS:     Recent Results (from the past 12 hour(s))   URINALYSIS W/ REFLEX CULTURE    Collection Time: 02/24/23 3:11 PM    Specimen: Urine   Result Value Ref Range    Color DARK YELLOW      Appearance CLOUDY (A) CLEAR      Specific gravity 1.025      pH (UA) 8.0 5.0 - 8.0      Protein TRACE (A) NEG mg/dL    Glucose Negative NEG mg/dL    Ketone TRACE (A) NEG mg/dL    Bilirubin Negative NEG      Blood Negative NEG      Urobilinogen 1.0 0.2 - 1.0 EU/dL    Nitrites Negative NEG      Leukocyte Esterase TRACE (A) NEG      WBC 0-4 0 - 4 /hpf    RBC 0-5 0 - 5 /hpf    Epithelial cells FEW FEW /lpf    Bacteria 2+ (A) NEG /hpf    UA:UC IF INDICATED CULTURE NOT INDICATED BY UA RESULT CNI             RADIOLOGY:  Non-plain film images such as CT, Ultrasound and MRI are read by the radiologist. Plain radiographic images are visualized and preliminarily interpreted by the ED Provider with the below findings:     Interpretation per the Radiologist below, if available at the time of this note:     No results found. PROCEDURES   Unless otherwise noted below, none  Procedures     EMERGENCY DEPARTMENT COURSE and DIFFERENTIAL DIAGNOSIS/MDM   Vitals:    Vitals:    02/24/23 1331 02/24/23 1435   BP: 127/77    Pulse: 81 75   Resp: 16 16   Temp: 98.5 °F (36.9 °C)    SpO2:  99%        Patient was given the following medications:  Medications - No data to display    CONSULTS: (Who and What was discussed)  None    Chronic Conditions: none     Social Determinants affecting Dx or Tx: None    Records Reviewed (source and summary): Prior medical records, Previous Radiology studies, Previous Laboratory studies, and Nursing notes    MDM (CC/HPI Summary, DDx, ED Course, Reassessment, Disposition Considerations -Tests not done, Shared Decision Making, Pt Expectation of Test or Tx.):     41 yo F presenting with c/o urinary changes exhibiting benign abd exam without guarding ,rigidity or flank pain. Vitals are within normal limits.  exam deferred due to lack of vaginal sx. Plan to obtain UA and r/o UTI.      ED Course as of 02/24/23 2127 Fri Feb 24, 2023 8833 Progress Note:   Patient ambulated out of the room with steady gait stating that she needs to leave to her her children and will return later. Urine has not been sent at this time prior to her departure. [NA]      ED Course User Index  [NA] Yandy Sanz NP           FINAL IMPRESSION     1. Dysuria          DISPOSITION/PLAN   Eloped        Care plan outlined and precautions discussed. Patient has no new complaints, changes, or physical findings. All of pt's questions and concerns were addressed. Patient was instructed and agrees to follow up with PCP, as well as to return to the ED upon further deterioration. Patient is ready to go home. PATIENT REFERRED TO:  Follow-up Information    None           DISCHARGE MEDICATIONS:  Discharge Medication List as of 2/24/2023  3:15 PM            DISCONTINUED MEDICATIONS:  Discharge Medication List as of 2/24/2023  3:15 PM          I am the Primary Clinician of Record. Yandy Sanz NP (electronically signed)    (Please note that parts of this dictation were completed with voice recognition software. Quite often unanticipated grammatical, syntax, homophones, and other interpretive errors are inadvertently transcribed by the computer software. Please disregards these errors.  Please excuse any errors that have escaped final proofreading.)

## 2023-02-24 NOTE — ED NOTES
Patient here with c/o suspected UTI. Patient states she took a \"home test\" which confirmed that she has a UTI. States she reached out to her PCP earlier today but they couldn't give her an appointment until the end of March. Patient denies fevers. Patient states burning dysuria. Patient denies concern for STI/STD. Emergency Department Nursing Plan of Care       The Nursing Plan of Care is developed from the Nursing assessment and Emergency Department Attending provider initial evaluation. The plan of care may be reviewed in the ED Provider note.     The Plan of Care was developed with the following considerations:   Patient / Family readiness to learn indicated by:verbalized understanding  Persons(s) to be included in education: patient  Barriers to Learning/Limitations:No    Signed     Dustin Addison RN    2/24/2023   2:37 PM

## 2023-02-25 ENCOUNTER — HOSPITAL ENCOUNTER (EMERGENCY)
Age: 38
Discharge: HOME | End: 2023-02-25
Attending: EMERGENCY MEDICINE
Payer: COMMERCIAL

## 2023-02-25 VITALS
DIASTOLIC BLOOD PRESSURE: 70 MMHG | OXYGEN SATURATION: 100 % | TEMPERATURE: 98.2 F | SYSTOLIC BLOOD PRESSURE: 112 MMHG | HEART RATE: 75 BPM | WEIGHT: 179 LBS | BODY MASS INDEX: 29.79 KG/M2 | RESPIRATION RATE: 18 BRPM

## 2023-02-25 DIAGNOSIS — N30.90 CYSTITIS: Primary | ICD-10-CM

## 2023-02-25 LAB
APPEARANCE UR: CLEAR
BACTERIA URNS QL MICRO: NEGATIVE /HPF
BILIRUB UR QL: NEGATIVE
CLUE CELLS VAG QL WET PREP: NORMAL
COLOR UR: NORMAL
EPITH CASTS URNS QL MICRO: NORMAL /LPF
GLUCOSE UR STRIP.AUTO-MCNC: NEGATIVE MG/DL
HCG UR QL: NEGATIVE
HGB UR QL STRIP: NEGATIVE
KETONES UR QL STRIP.AUTO: NEGATIVE MG/DL
KOH PREP SPEC: NORMAL
LEUKOCYTE ESTERASE UR QL STRIP.AUTO: NEGATIVE
NITRITE UR QL STRIP.AUTO: NEGATIVE
PH UR STRIP: 7.5 (ref 5–8)
PROT UR STRIP-MCNC: NEGATIVE MG/DL
RBC #/AREA URNS HPF: NORMAL /HPF (ref 0–5)
SERVICE CMNT-IMP: NORMAL
SP GR UR REFRACTOMETRY: 1.02
T VAGINALIS VAG QL WET PREP: NORMAL
UA: UC IF INDICATED,UAUC: NORMAL
UROBILINOGEN UR QL STRIP.AUTO: 0.2 EU/DL (ref 0.2–1)
WBC URNS QL MICRO: NORMAL /HPF (ref 0–4)

## 2023-02-25 PROCEDURE — 87210 SMEAR WET MOUNT SALINE/INK: CPT

## 2023-02-25 PROCEDURE — 99283 EMERGENCY DEPT VISIT LOW MDM: CPT

## 2023-02-25 PROCEDURE — 81001 URINALYSIS AUTO W/SCOPE: CPT

## 2023-02-25 PROCEDURE — 81025 URINE PREGNANCY TEST: CPT

## 2023-02-25 RX ORDER — PHENAZOPYRIDINE HYDROCHLORIDE 200 MG/1
200 TABLET, FILM COATED ORAL 3 TIMES DAILY
Qty: 6 TABLET | Refills: 0 | Status: SHIPPED | OUTPATIENT
Start: 2023-02-25 | End: 2023-02-27

## 2023-02-25 NOTE — ED PROVIDER NOTES
[facility] EMERGENCY DEPT  EMERGENCY DEPARTMENT ENCOUNTER       Pt Name: Aden Espinoza  MRN: [id number]  [place] 1985  Date of evaluation: 2/25/2023  Provider: Kenya Chamorro NP   PCP: Leslee Matthews NP  Note Started: 11:11 AM 2/25/23     CHIEF COMPLAINT       Chief Complaint   Patient presents with    Bladder Infection        HISTORY OF PRESENT ILLNESS: 1 or more elements      History From: Patient  HPI Limitations : None     Aden Espinoza is a 40 y.o. female who presents urinary burning for the past 4 days. She reports suprapubic pain and bilateral low back pain. She states every time she urinates it burns. Patient states she took a home UTI test and it was positive. He also reports suprapubic and pressure. Urinary urgency and frequency. On chart review noted patient was evaluated in this emergency department yesterday negative for urine infection. Nursing Notes were all reviewed and agreed with or any disagreements were addressed in the HPI. REVIEW OF SYSTEMS      Review of Systems   Constitutional:  Negative for fatigue and fever. Respiratory:  Negative for shortness of breath and wheezing. Cardiovascular:  Negative for chest pain. Gastrointestinal:  Positive for abdominal pain. Genitourinary:  Positive for dysuria. Musculoskeletal:  Positive for back pain. Negative for arthralgias, myalgias, neck pain and neck stiffness. Skin:  Negative for pallor and rash. Neurological:  Negative for dizziness, tremors and headaches. All other systems reviewed and are negative. Positives and Pertinent negatives as per HPI.     PAST HISTORY     Past Medical History:  Past Medical History:   Diagnosis Date    Asthma     Back pain     Bipolar 1 disorder (HCC)     Gastrointestinal disorder     IBS    Other ill-defined conditions(189.89)     Frequent Boils    Scoliosis        Past Surgical History:  Past Surgical History:   Procedure Laterality Date    HX TONSILLECTOMY      HX WISDOM TEETH EXTRACTION         Family History:  Family History   Problem Relation Age of Onset    Cancer Neg Hx     Hypertension Neg Hx     Diabetes Neg Hx     Stroke Neg Hx        Social History:  Social History     Tobacco Use    Smoking status: Every Day     Packs/day: 1.00     Years: 2.00     Pack years: 2.00     Types: Cigarettes     Last attempt to quit: 2013     Years since quittin.5    Smokeless tobacco: Never   Vaping Use    Vaping Use: Never used   Substance Use Topics    Alcohol use: No     Alcohol/week: 0.0 standard drinks    Drug use: No       Allergies: Allergies   Allergen Reactions    Naprosyn [Naproxen] Hives and Nausea and Vomiting     Can take ibuprofen       CURRENT MEDICATIONS      Previous Medications    ALBUTEROL (PROVENTIL HFA, VENTOLIN HFA, PROAIR HFA) 90 MCG/ACTUATION INHALER    Take 1-2 Puffs by inhalation every four (4) hours as needed for Wheezing. ALPRAZOLAM (XANAX) 1 MG TABLET        ERGOCALCIFEROL (ERGOCALCIFEROL) 1,250 MCG (50,000 UNIT) CAPSULE    TAKE 1 CAPSULE BY MOUTH EVERY SEVEN (7) DAYS. FUROSEMIDE (LASIX) 20 MG TABLET    TAKE 1 TABLET BY MOUTH DAILY FOR VISIBLE WATER RETENTION    HYDROCORTISONE (CORTISONE) 1 % TOPICAL CREAM    Apply  to affected area two (2) times a day. use thin layer    KLOR-CON M20 20 MEQ TABLET    TAKE 1 TABLET BY MOUTH DAILY. WITH DIURETIC (HYDROCHLORTHIAZIDE OR LASIX) INDICATIONS: PREVENTION OF LOW POTASSIUM IN THE BLOOD    MEDROXYPROGESTERONE (DEPO-PROVERA) 150 MG/ML SYRG    INJECT 1 ML BY INTRAMUSCULAR ROUTE EVERY THREE (3) MONTHS. OXCARBAZEPINE (TRILEPTAL) 600 MG TABLET    TAKE 1 TABLET BY MOUTH TWICE A DAY AS DIRECTED       PHYSICAL EXAM      ED Triage Vitals [23 0901]   ED Encounter Vitals Group      /70      Pulse (Heart Rate) 75      Resp Rate 18      Temp 98.2 °F (36.8 °C)      Temp src       O2 Sat (%) 100 %      Weight 179 lb      Height         Physical Exam  Vitals and nursing note reviewed.  Exam conducted with a chaperone present. Constitutional:       General: She is not in acute distress. Appearance: Normal appearance. She is well-developed. HENT:      Head: Normocephalic and atraumatic. Right Ear: External ear normal.      Left Ear: External ear normal.      Nose: Nose normal.      Mouth/Throat:      Mouth: Mucous membranes are moist.   Eyes:      Conjunctiva/sclera: Conjunctivae normal.   Cardiovascular:      Rate and Rhythm: Normal rate and regular rhythm. Heart sounds: Normal heart sounds. Pulmonary:      Effort: Pulmonary effort is normal. No respiratory distress. Breath sounds: Normal breath sounds. No wheezing. Abdominal:      General: Bowel sounds are normal.      Palpations: Abdomen is soft. Tenderness: There is no abdominal tenderness. Genitourinary:     Comments: Noted erythema of labia majora and minora  Musculoskeletal:         General: Normal range of motion. Cervical back: Normal range of motion and neck supple. Lymphadenopathy:      Cervical: No cervical adenopathy. Skin:     General: Skin is warm and dry. Findings: No rash. Neurological:      Mental Status: She is alert and oriented to person, place, and time. Cranial Nerves: No cranial nerve deficit. Coordination: Coordination normal.   Psychiatric:         Behavior: Behavior normal.         Thought Content:  Thought content normal.         Judgment: Judgment normal.        DIAGNOSTIC RESULTS   LABS:     Recent Results (from the past 12 hour(s))   URINALYSIS W/ REFLEX CULTURE    Collection Time: 02/25/23  9:17 AM    Specimen: Urine   Result Value Ref Range    Color YELLOW/STRAW      Appearance CLEAR CLEAR      Specific gravity 1.020      pH (UA) 7.5 5.0 - 8.0      Protein Negative NEG mg/dL    Glucose Negative NEG mg/dL    Ketone Negative NEG mg/dL    Bilirubin Negative NEG      Blood Negative NEG      Urobilinogen 0.2 0.2 - 1.0 EU/dL    Nitrites Negative NEG      Leukocyte Esterase Negative NEG      WBC 0-4 0 - 4 /hpf    RBC 0-5 0 - 5 /hpf    Epithelial cells FEW FEW /lpf    Bacteria Negative NEG /hpf    UA:UC IF INDICATED CULTURE NOT INDICATED BY UA RESULT CNI     HCG URINE, QL. - POC    Collection Time: 02/25/23  9:25 AM   Result Value Ref Range    Pregnancy test,urine (POC) Negative NEG     KOH, OTHER SOURCES    Collection Time: 02/25/23 10:20 AM    Specimen: Endocervix; Other   Result Value Ref Range    Special Requests: NO SPECIAL REQUESTS      KOH NO YEAST SEEN     WET PREP    Collection Time: 02/25/23 10:20 AM    Specimen: Miscellaneous sample   Result Value Ref Range    Clue cells CLUE CELLS ABSENT      Wet prep NO TRICHOMONAS SEEN          EKG: When ordered, EKG's are interpreted by the Emergency Department Physician in the absence of a cardiologist.  Please see their note for interpretation of EKG. RADIOLOGY:  Non-plain film images such as CT, Ultrasound and MRI are read by the radiologist. Plain radiographic images are visualized and preliminarily interpreted by the ED Provider with the below findings:          Interpretation per the Radiologist below, if available at the time of this note:     No results found. PROCEDURES   Unless otherwise noted below, none  Procedures     CRITICAL CARE TIME       EMERGENCY DEPARTMENT COURSE and DIFFERENTIAL DIAGNOSIS/MDM   Vitals:    Vitals:    02/25/23 0901   BP: 112/70   Pulse: 75   Resp: 18   Temp: 98.2 °F (36.8 °C)   SpO2: 100%   Weight: 81.2 kg (179 lb)        Patient was given the following medications:  Medications - No data to display    CONSULTS: (Who and What was discussed)  None    Chronic Conditions: asthma IBS    Social Determinants affecting Dx or Tx: None    Records Reviewed (source and summary of external records): Nursing notes Previous medical records    CC/HPI Summary, DDx, ED Course, and Reassessment: 9year-old female presents with dysuria frequency and suprapubic pressure. Seen this emergency department yesterday no UTI.   Patient states shetook a urine test at home and it was positive. for UTI. Denies abnormal vaginal discharge or concern for STD exam mild suprapubic tenderness to palpate. I will order urinalysis and also check for BV or yeast.  DDX cystitis UTI BV    ED Course as of 02/25/23 1111   Sat Feb 25, 2023   1054 Special Requests[de-identified] NO SPECIAL REQUESTS  Urine negative for bacteria or leukocytes WBCs this is patient's second visit for dysuria. I suspect cystitis no infection will prescribe Pyridium. Advised patient to decrease coffee intake follow-up with GYN. [AN]      ED Course User Index  [AN] Hari Mcdowell NP       Disposition Considerations (Tests not done, Shared Decision Making, Pt Expectation of Test or Tx.):      FINAL IMPRESSION     1. Cystitis          DISPOSITION/PLAN   Discharged    Discharge Note: The patient is stable for discharge home. The signs, symptoms, diagnosis, and discharge instructions have been discussed, understanding conveyed, and agreed upon. The patient is to follow up as recommended or return to ER should their symptoms worsen. PATIENT REFERRED TO:  Follow-up Information       Follow up With Specialties Details Why Contact Info    [facility] [address] [place] [address] Gynecology In 1 week  [place] [id number]  [phone]              DISCHARGE MEDICATIONS:  Current Discharge Medication List        START taking these medications    Details   phenazopyridine (Pyridium) 200 mg tablet Take 1 Tablet by mouth three (3) times daily for 2 days. Qty: 6 Tablet, Refills: 0  Start date: 2/25/2023, End date: 2/27/2023               DISCONTINUED MEDICATIONS:  Current Discharge Medication List          ED Attending Involvement : I have seen and evaluated the patient. My supervision physician was available for consultation. I am the Primary Clinician of Record.    Phil Kamara NP (electronically signed)    (Please note that parts of this dictation were completed with voice recognition software. Quite often unanticipated grammatical, syntax, homophones, and other interpretive errors are inadvertently transcribed by the computer software. Please disregards these errors.  Please excuse any errors that have escaped final proofreading.)

## 2023-02-25 NOTE — ED NOTES
Discharge instructions were given to the patient by Wilma Ferreira RN       The patient left the Emergency Department ambulatory, alert and oriented and in no acute distress with 1 prescriptions. The patient was encouraged to call or return to the ED for worsening issues or problems and was encouraged to schedule a follow up appointment for continuing care. The patient verbalized understanding of discharge instructions and prescriptions, all questions were answered. The patient has no further concerns at this time.

## 2023-02-25 NOTE — ED TRIAGE NOTES
Reports dysuria, increased urgency and frequency. Pt tried to make appt with PMD but it was end of March.

## 2023-02-25 NOTE — ED NOTES
Pt presents ambulatory to ED complaining of urinary pain, frequency, and urgency for the past 4 days. t reports taking a at home UTI test and it was positive. Pt is alert and oriented x 4, RR even and unlabored, skin is warm and dry. Assesment completed and pt updated on plan of care. Emergency Department Nursing Plan of Care       The Nursing Plan of Care is developed from the Nursing assessment and Emergency Department Attending provider initial evaluation. The plan of care may be reviewed in the ED Provider note.     The Plan of Care was developed with the following considerations:   Patient / Family readiness to learn indicated by:verbalized understanding  Persons(s) to be included in education: patient  Barriers to Learning/Limitations:No    Signed     Chichi Malave RN    2/25/2023   9:16 AM

## 2023-03-02 NOTE — PROGRESS NOTES
Date last pap: 2/11/2022. Last Depo-Provera: 12/8/2022. Side Effects if any: None. Serum HCG indicated? Neg.  Depo-Provera 150 mg IM given by: Dev Singh LPN. Next appointment due May 12th, 2023- May 26th 2023.

## 2023-03-20 RX ORDER — ERGOCALCIFEROL 1.25 MG/1
50000 CAPSULE ORAL
Qty: 12 CAPSULE | Refills: 3 | Status: SHIPPED | OUTPATIENT
Start: 2023-03-20

## 2023-03-22 ENCOUNTER — APPOINTMENT (OUTPATIENT)
Dept: GENERAL RADIOLOGY | Age: 38
End: 2023-03-22
Attending: STUDENT IN AN ORGANIZED HEALTH CARE EDUCATION/TRAINING PROGRAM
Payer: COMMERCIAL

## 2023-03-22 ENCOUNTER — HOSPITAL ENCOUNTER (EMERGENCY)
Age: 38
Discharge: HOME | End: 2023-03-22
Attending: STUDENT IN AN ORGANIZED HEALTH CARE EDUCATION/TRAINING PROGRAM
Payer: COMMERCIAL

## 2023-03-22 VITALS
OXYGEN SATURATION: 100 % | DIASTOLIC BLOOD PRESSURE: 90 MMHG | RESPIRATION RATE: 18 BRPM | TEMPERATURE: 98.2 F | HEART RATE: 59 BPM | SYSTOLIC BLOOD PRESSURE: 117 MMHG | WEIGHT: 178 LBS | BODY MASS INDEX: 29.66 KG/M2 | HEIGHT: 65 IN

## 2023-03-22 DIAGNOSIS — R09.89 CHEST CONGESTION: ICD-10-CM

## 2023-03-22 DIAGNOSIS — R05.1 ACUTE COUGH: Primary | ICD-10-CM

## 2023-03-22 PROCEDURE — 74011250637 HC RX REV CODE- 250/637: Performed by: STUDENT IN AN ORGANIZED HEALTH CARE EDUCATION/TRAINING PROGRAM

## 2023-03-22 PROCEDURE — 71046 X-RAY EXAM CHEST 2 VIEWS: CPT

## 2023-03-22 PROCEDURE — 99283 EMERGENCY DEPT VISIT LOW MDM: CPT

## 2023-03-22 RX ORDER — GUAIFENESIN 600 MG/1
600 TABLET, EXTENDED RELEASE ORAL 2 TIMES DAILY
Qty: 30 TABLET | Refills: 0 | Status: SHIPPED | OUTPATIENT
Start: 2023-03-22

## 2023-03-22 RX ORDER — ALBUTEROL SULFATE 90 UG/1
2 AEROSOL, METERED RESPIRATORY (INHALATION) ONCE
Status: COMPLETED | OUTPATIENT
Start: 2023-03-22 | End: 2023-03-22

## 2023-03-22 RX ADMIN — ALBUTEROL SULFATE 2 PUFF: 90 AEROSOL, METERED RESPIRATORY (INHALATION) at 08:25

## 2023-03-22 NOTE — ED TRIAGE NOTES
Patient presents to the ED with c/o cough x3 days. Pt reports a dry cough but when she clear her throat clear mucus comes up. Pt reports taking mucinex and tylenol with no relief.

## 2023-03-22 NOTE — Clinical Note
[facility] EMERGENCY DEPT  [address] [id number] [phone]    Work/School Note    Date: 3/22/2023    To Whom It May concern:    Michael Thompson was seen and treated today in the emergency room by the following provider(s):  Attending Provider: Michael Brasher MD.      Michael Thompson is excused from work/school on 3/22/2023 through 3/24/2023. She is medically clear to return to work/school on 3/25/2023.          Sincerely,          Danish Alexander MD

## 2023-03-22 NOTE — ED PROVIDER NOTES
[address] [facility] EMERGENCY DEPT  EMERGENCY DEPARTMENT ENCOUNTER       Pt Name: Jolanta Gambino  MRN: [id number]  [place] 1985  Date of evaluation: 3/22/2023  Provider: Priscilla Stockton MD   PCP: Lila Gaviria NP  Note Started: 8:12 AM 3/22/23     CHIEF COMPLAINT       Chief Complaint   Patient presents with    Cough        HISTORY OF PRESENT ILLNESS: 1 or more elements    History From: Patient  HPI Limitations : None     Jolanta Gambino is a 40 y.o. female with Pmhx listed below     Patient is a 40-year-old female with history of asthma presenting with concern of dry cough. Patient states that it began about 2 days ago, states that she feels as if something is stuck in her chest, has had some chest tightness and intermittent shortness of breath due to this. Patient states that she has been feeling nauseous secondary to the congestion as well, has been using over-the-counter medication such as Tylenol Cold and flu and sinus CT without any significant relief. Patient states that she has had sweats every night but denies any fevers, has had no vomiting or diarrhea, patient states that she was previously in her state of health, does endorse asthma history but does not have albuterol at home, no other complaints today. Nursing Notes were all reviewed and agreed with or any disagreements were addressed in the HPI. REVIEW OF SYSTEMS      Positives and Pertinent negatives as per HPI. PAST HISTORY     Past Medical History:  Past Medical History:   Diagnosis Date    Asthma     Back pain     Bipolar 1 disorder (HCC)     Gastrointestinal disorder     IBS    Other ill-defined conditions(569.89)     Frequent Boils    Scoliosis      Previous Medications    ALBUTEROL (PROVENTIL HFA, VENTOLIN HFA, PROAIR HFA) 90 MCG/ACTUATION INHALER    Take 1-2 Puffs by inhalation every four (4) hours as needed for Wheezing.     ALPRAZOLAM (XANAX) 1 MG TABLET        ERGOCALCIFEROL (ERGOCALCIFEROL) 1,250 MCG (50,000 UNIT) CAPSULE    TAKE 1 CAPSULE BY MOUTH EVERY SEVEN (7) DAYS. FUROSEMIDE (LASIX) 20 MG TABLET    TAKE 1 TABLET BY MOUTH DAILY FOR VISIBLE WATER RETENTION    HYDROCORTISONE (CORTISONE) 1 % TOPICAL CREAM    Apply  to affected area two (2) times a day. use thin layer    KLOR-CON M20 20 MEQ TABLET    TAKE 1 TABLET BY MOUTH DAILY. WITH DIURETIC (HYDROCHLORTHIAZIDE OR LASIX) INDICATIONS: PREVENTION OF LOW POTASSIUM IN THE BLOOD    MEDROXYPROGESTERONE (DEPO-PROVERA) 150 MG/ML SYRG    INJECT 1 ML BY INTRAMUSCULAR ROUTE EVERY THREE (3) MONTHS. OXCARBAZEPINE (TRILEPTAL) 600 MG TABLET    TAKE 1 TABLET BY MOUTH TWICE A DAY AS DIRECTED       Past Surgical History:  Past Surgical History:   Procedure Laterality Date    HX TONSILLECTOMY      HX WISDOM TEETH EXTRACTION         Family History:  Family History   Problem Relation Age of Onset    Cancer Neg Hx     Hypertension Neg Hx     Diabetes Neg Hx     Stroke Neg Hx        Social History:  Social History     Tobacco Use    Smoking status: Every Day     Packs/day: 1.00     Years: 2.00     Pack years: 2.00     Types: Cigarettes     Last attempt to quit: 2013     Years since quittin.6    Smokeless tobacco: Never   Vaping Use    Vaping Use: Never used   Substance Use Topics    Alcohol use: No     Alcohol/week: 0.0 standard drinks    Drug use: No       Allergies: Allergies   Allergen Reactions    Naprosyn [Naproxen] Hives and Nausea and Vomiting     Can take ibuprofen       PHYSICAL EXAM      ED Triage Vitals [23 0746]   ED Encounter Vitals Group      BP (!) 117/90      Pulse (Heart Rate) (!) 59      Resp Rate 18      Temp 98.2 °F (36.8 °C)      Temp src       O2 Sat (%) 100 %      Weight 178 lb      Height 5' 5\"        Physical Exam  Vitals reviewed. Constitutional:       General: She is not in acute distress. Appearance: Normal appearance. She is not ill-appearing, toxic-appearing or diaphoretic. HENT:      Head: Normocephalic.       Mouth/Throat:      Mouth: Mucous membranes are moist.   Eyes:      Conjunctiva/sclera: Conjunctivae normal.   Cardiovascular:      Rate and Rhythm: Normal rate. Pulmonary:      Effort: Pulmonary effort is normal. No respiratory distress. Breath sounds: No wheezing, rhonchi or rales. Abdominal:      General: Abdomen is flat. Musculoskeletal:         General: No deformity. Cervical back: Neck supple. Skin:     General: Skin is warm and dry. Neurological:      General: No focal deficit present. Mental Status: She is alert. Psychiatric:         Mood and Affect: Mood normal.        EMERGENCY DEPARTMENT COURSE and DIFFERENTIAL DIAGNOSIS/MDM   CC/HPI Summary, DDx, ED Course, and Reassessment:     MDM  Number of Diagnoses or Management Options  Acute cough  Chest congestion  Diagnosis management comments: Patient is a well-appearing 40-year-old female presenting with concerns of dry cough, ongoing x3 days, not amendable to home therapies, states that she feels as if she has mucus stuck in her chest, cannot get it up. Patient is also have sweats, no fever, patient present stable, no apparent distress, vital signs within normal limits, oxygen saturation 100% in no respiratory distress, no significant findings on lung exam, high suspicion for viral etiology with chest congestion and cough, given chest congestion we will plan on x-ray to evaluate for pneumonia, will provide patient with albuterol given history of asthma although no wheezing on exam we will possibly provide symptomatic relief of shortness of breath, if work-up negative will consider discharge with Mucinex therapy and return precautions.              Vitals:    03/22/23 0746   BP: (!) 117/90   Pulse: (!) 59   Resp: 18   Temp: 98.2 °F (36.8 °C)   SpO2: 100%   Weight: 80.7 kg (178 lb)   Height: 5' 5\" (1.651 m)        Patient was given the following medications:  Medications   albuterol (PROVENTIL HFA, VENTOLIN HFA, PROAIR HFA) inhaler 2 Puff (2 Puffs Inhalation Given 3/22/23 0825) CONSULTS:  None    Social Determinants affecting Dx or Tx: None    Records Reviewed (source and summary of external notes): Prior medical records  DIAGNOSTIC RESULTS   LABS:     No results found for this or any previous visit (from the past 12 hour(s)). EKG interpreted by me:     RADIOLOGY:  Non-plain film images such as CT, Ultrasound and MRI are read by the radiologist.   Plain radiographic images are often visualized and preliminarily interpreted by the ED, if an interpretation was completed the findings will be listed below:        Interpretation per the Radiologist below, if available at the time of this note:     No results found. PROCEDURES   Unless otherwise noted below, none  Procedures     CRITICAL CARE TIME       FINAL IMPRESSION     1. Acute cough    2. Chest congestion          DISPOSITION/PLAN   Discharged    Discharge Note: The patient is stable for discharge home. The signs, symptoms, diagnosis, and discharge instructions have been discussed, understanding conveyed, and agreed upon. The patient is to follow up as recommended or return to ER should their symptoms worsen. PATIENT REFERRED TO:  Follow-up Information       Follow up With Specialties Details Why Contact Info    Pauline Mahoney NP Nurse Practitioner   [facility]  [address] [place], [place] [address]r [facility] [address]  [phone]      [facility] EMERGENCY DEPT Emergency Medicine Call  If symptoms worsen [facility]  [phone]              DISCHARGE MEDICATIONS:  Current Discharge Medication List        START taking these medications    Details   guaiFENesin ER (Mucinex) 600 mg ER tablet Take 1 Tablet by mouth two (2) times a day. Qty: 30 Tablet, Refills: 0  Start date: 3/22/2023               DISCONTINUED MEDICATIONS:  Current Discharge Medication List          I am the Primary Clinician of Record.    Signed By: Caitlyn Roque MD     March 22, 2023      (Please note that parts of this dictation were completed with voice recognition software. Quite often unanticipated grammatical, syntax, homophones, and other interpretive errors are inadvertently transcribed by the computer software. Please disregards these errors.  Please excuse any errors that have escaped final proofreading.)

## 2023-03-22 NOTE — ED NOTES
Pt presents ambulatory to ED complaining of chest congestion x 3 days. Patient complains of a persistent dry cough, states it feels like she has \"a blockage in her airway. \" States no matter how much she coughs she cannot bring anything up. Endorses taking Mucinex x 3 days with no relief. Hx of asthma, does not know where her inhaler is. Pt is alert and oriented x 4, RR even and unlabored, skin is warm and dry. Assesment completed and pt updated on plan of care. Emergency Department Nursing Plan of Care       The Nursing Plan of Care is developed from the Nursing assessment and Emergency Department Attending provider initial evaluation. The plan of care may be reviewed in the ED Provider note.     The Plan of Care was developed with the following considerations:   Patient / Family readiness to learn indicated by:verbalized understanding  Persons(s) to be included in education: patient  Barriers to Learning/Limitations:No    Signed     Arpan Agarwal    3/22/2023   8:01 AM

## 2023-03-28 ENCOUNTER — OFFICE VISIT (OUTPATIENT)
Dept: INTERNAL MEDICINE CLINIC | Age: 38
End: 2023-03-28
Payer: COMMERCIAL

## 2023-03-28 VITALS
DIASTOLIC BLOOD PRESSURE: 77 MMHG | SYSTOLIC BLOOD PRESSURE: 102 MMHG | HEART RATE: 84 BPM | RESPIRATION RATE: 18 BRPM | OXYGEN SATURATION: 99 % | TEMPERATURE: 97.7 F | HEIGHT: 65 IN | WEIGHT: 180 LBS | BODY MASS INDEX: 29.99 KG/M2

## 2023-03-28 DIAGNOSIS — Z13.220 SCREENING FOR LIPID DISORDERS: ICD-10-CM

## 2023-03-28 DIAGNOSIS — E55.9 VITAMIN D DEFICIENCY: ICD-10-CM

## 2023-03-28 DIAGNOSIS — G89.29 CHRONIC MIDLINE LOW BACK PAIN WITHOUT SCIATICA: ICD-10-CM

## 2023-03-28 DIAGNOSIS — J45.40 MODERATE PERSISTENT REACTIVE AIRWAY DISEASE WITHOUT COMPLICATION: ICD-10-CM

## 2023-03-28 DIAGNOSIS — Z13.0 SCREENING FOR ENDOCRINE, NUTRITIONAL, METABOLIC AND IMMUNITY DISORDER: ICD-10-CM

## 2023-03-28 DIAGNOSIS — R23.3 EASY BRUISING: ICD-10-CM

## 2023-03-28 DIAGNOSIS — Z13.29 SCREENING FOR ENDOCRINE, NUTRITIONAL, METABOLIC AND IMMUNITY DISORDER: ICD-10-CM

## 2023-03-28 DIAGNOSIS — Z13.21 SCREENING FOR ENDOCRINE, NUTRITIONAL, METABOLIC AND IMMUNITY DISORDER: ICD-10-CM

## 2023-03-28 DIAGNOSIS — M54.50 CHRONIC MIDLINE LOW BACK PAIN WITHOUT SCIATICA: ICD-10-CM

## 2023-03-28 DIAGNOSIS — Z13.228 SCREENING FOR ENDOCRINE, NUTRITIONAL, METABOLIC AND IMMUNITY DISORDER: ICD-10-CM

## 2023-03-28 DIAGNOSIS — Z00.00 ADULT GENERAL MEDICAL EXAMINATION: Primary | ICD-10-CM

## 2023-03-28 DIAGNOSIS — N30.00 ACUTE CYSTITIS WITHOUT HEMATURIA: ICD-10-CM

## 2023-03-28 DIAGNOSIS — Z00.00 ADULT GENERAL MEDICAL EXAMINATION: ICD-10-CM

## 2023-03-28 LAB
BILIRUB UR QL STRIP: NEGATIVE
GLUCOSE UR-MCNC: NEGATIVE MG/DL
KETONES P FAST UR STRIP-MCNC: NEGATIVE MG/DL
PH UR STRIP: 7 [PH] (ref 4.6–8)
PROT UR QL STRIP: NEGATIVE
SP GR UR STRIP: 1.02 (ref 1–1.03)
UA UROBILINOGEN AMB POC: NORMAL (ref 0.2–1)
URINALYSIS CLARITY POC: NORMAL
URINALYSIS COLOR POC: YELLOW
URINE BLOOD POC: NEGATIVE
URINE LEUKOCYTES POC: NEGATIVE
URINE NITRITES POC: POSITIVE

## 2023-03-28 PROCEDURE — 99395 PREV VISIT EST AGE 18-39: CPT | Performed by: NURSE PRACTITIONER

## 2023-03-28 PROCEDURE — 81001 URINALYSIS AUTO W/SCOPE: CPT | Performed by: NURSE PRACTITIONER

## 2023-03-28 RX ORDER — DICLOFENAC SODIUM 75 MG/1
75 TABLET, DELAYED RELEASE ORAL
Qty: 60 TABLET | Refills: 2 | Status: SHIPPED | OUTPATIENT
Start: 2023-03-28

## 2023-03-28 RX ORDER — ACETAMINOPHEN 500 MG
1000 TABLET ORAL
Qty: 100 TABLET | Refills: 11 | Status: SHIPPED | OUTPATIENT
Start: 2023-03-28

## 2023-03-28 RX ORDER — PREDNISONE 20 MG/1
40 TABLET ORAL
Qty: 10 TABLET | Refills: 0 | Status: SHIPPED | OUTPATIENT
Start: 2023-03-28

## 2023-03-28 RX ORDER — PHENAZOPYRIDINE HYDROCHLORIDE 200 MG/1
200 TABLET, FILM COATED ORAL 3 TIMES DAILY
Qty: 6 TABLET | Refills: 0 | Status: SHIPPED | OUTPATIENT
Start: 2023-03-28 | End: 2023-03-30

## 2023-03-28 RX ORDER — SULFAMETHOXAZOLE AND TRIMETHOPRIM 800; 160 MG/1; MG/1
1 TABLET ORAL 2 TIMES DAILY
Qty: 14 TABLET | Refills: 0 | Status: SHIPPED | OUTPATIENT
Start: 2023-03-28 | End: 2023-04-04

## 2023-03-28 RX ORDER — METHOCARBAMOL 500 MG/1
500 TABLET, FILM COATED ORAL
Qty: 40 TABLET | Refills: 2 | Status: SHIPPED | OUTPATIENT
Start: 2023-03-28

## 2023-03-28 NOTE — PROGRESS NOTES
Charity Frey is a 40 y.o. female presenting for annual checkup. Specific concerns today: having lower back pain for past 6 months. No fall, injury, or fall. Associated with right leg pain radiation. Denies numbness, tingling, or weakness. No longer with leg edema, resolved since last visit. Coughing for past 1 week. Associated with  CP and SOB. Seen in ER with CXR, told it was normal and prescribed Mucinex and given AMALIA inhaler with BID use since visit on 22nd. ROS: Feeling well. No black or bloody stools. Last BM: today, [place]#4. Averages 4 BMs every 7 days. Averages drinking 48 oz of water daily. No gynecologic/prostatic symptoms. No neurological complaints. Review of Systems  Constitutional: negative for fevers, chills, anorexia and weight loss  Eyes:   negative for visual disturbance, drainage, and irritation  ENT:   negative for tinnitus,sore throat,nasal congestion,ear pain,and hoarseness  Respiratory:  negative for cough, hemoptysis, dyspnea, and wheezing  CV:   negative for chest pain, palpitations, and lower extremity edema  GI:   +radiating abd pain from back. Lower abd pain. Bladder pressure.  negative for nausea, vomiting, diarrhea, abdominal pain, and melena  Endo:               negative for polyuria,polydipsia,polyphagia, and heat intolerance  Genitourinary: negative for frequency, urgency, dysuria, retention, and hematuria  Integument:  negative for rash, ulcerations, and pruritus  Hematologic:  negative for easy bruising and bleeding  Musculoskel: negative for arthralgias, muscle weakness,and joint pain/swelling  Neurological:  negative for headaches, dizziness, vertigo,and memory/gait problems  Behavl/Psych: negative for feelings of anxiety, depression, suicide, and mood changes    Past Medical History:   Diagnosis Date    Asthma     Back pain     Bipolar 1 disorder (HCC)     Gastrointestinal disorder     IBS    Other ill-defined conditions(735.32)     Frequent Boils Scoliosis      Past Surgical History:   Procedure Laterality Date    HX TONSILLECTOMY      HX WISDOM TEETH EXTRACTION       Social History     Socioeconomic History    Marital status: SINGLE   Tobacco Use    Smoking status: Every Day     Packs/day: 1.00     Years: 2.00     Pack years: 2.00     Types: Cigarettes     Last attempt to quit: 2013     Years since quittin.6     Passive exposure: Current    Smokeless tobacco: Never   Vaping Use    Vaping Use: Never used   Substance and Sexual Activity    Alcohol use: No     Alcohol/week: 0.0 standard drinks    Drug use: No    Sexual activity: Yes     Partners: Male     Birth control/protection: Injection     Family History   Problem Relation Age of Onset    Cancer Neg Hx     Hypertension Neg Hx     Diabetes Neg Hx     Stroke Neg Hx      Current Outpatient Medications   Medication Sig Dispense Refill    acetaminophen (TYLENOL) 500 mg tablet Take 2 Tablets by mouth three (3) times daily as needed for Pain. 100 Tablet 11    diclofenac EC (VOLTAREN) 75 mg EC tablet Take 1 Tablet by mouth two (2) times daily as needed for Pain. With food 60 Tablet 2    methocarbamoL (ROBAXIN) 500 mg tablet Take 1 Tablet by mouth four (4) times daily as needed for Muscle Spasm(s). 40 Tablet 2    predniSONE (DELTASONE) 20 mg tablet Take 2 Tablets by mouth daily (with breakfast). 10 Tablet 0    trimethoprim-sulfamethoxazole (Bactrim DS) 160-800 mg per tablet Take 1 Tablet by mouth two (2) times a day for 7 days. 14 Tablet 0    phenazopyridine (Pyridium) 200 mg tablet Take 1 Tablet by mouth three (3) times daily for 2 days. 6 Tablet 0    ergocalciferol (ERGOCALCIFEROL) 1,250 mcg (50,000 unit) capsule TAKE 1 CAPSULE BY MOUTH EVERY SEVEN (7) DAYS. 12 Capsule 3    medroxyPROGESTERone (DEPO-PROVERA) 150 mg/mL syrg INJECT 1 ML BY INTRAMUSCULAR ROUTE EVERY THREE (3) MONTHS.  1 mL 3    albuterol (PROVENTIL HFA, VENTOLIN HFA, PROAIR HFA) 90 mcg/actuation inhaler Take 1-2 Puffs by inhalation every four (4) hours as needed for Wheezing. 1 Each 0    ALPRAZolam (XANAX) 1 mg tablet       guaiFENesin ER (Mucinex) 600 mg ER tablet Take 1 Tablet by mouth two (2) times a day. (Patient not taking: Reported on 3/28/2023) 30 Tablet 0    hydrocortisone (Cortisone) 1 % topical cream Apply  to affected area two (2) times a day. use thin layer (Patient not taking: No sig reported) 30 g 0    furosemide (LASIX) 20 mg tablet TAKE 1 TABLET BY MOUTH DAILY FOR VISIBLE WATER RETENTION (Patient not taking: No sig reported) 15 Tablet 0    Klor-Con M20 20 mEq tablet TAKE 1 TABLET BY MOUTH DAILY. WITH DIURETIC (HYDROCHLORTHIAZIDE OR LASIX) INDICATIONS: PREVENTION OF LOW POTASSIUM IN THE BLOOD (Patient not taking: No sig reported) 15 Tablet 0    OXcarbazepine (TRILEPTAL) 600 mg tablet TAKE 1 TABLET BY MOUTH TWICE A DAY AS DIRECTED (Patient not taking: No sig reported)       Allergies   Allergen Reactions    Naprosyn [Naproxen] Hives and Nausea and Vomiting     Can take ibuprofen       Objective:  Visit Vitals  /77 (BP 1 Location: Left upper arm, BP Patient Position: Sitting, BP Cuff Size: Large adult)   Pulse 84   Temp 97.7 °F (36.5 °C) (Temporal)   Resp 18   Ht 5' 5\" (1.651 m)   Wt 180 lb (81.6 kg)   LMP  (LMP Unknown)   SpO2 99%   BMI 29.95 kg/m²     Wt Readings from Last 3 Encounters:   03/28/23 180 lb (81.6 kg)   03/22/23 178 lb (80.7 kg)   02/25/23 179 lb (81.2 kg)     Physical Exam:   General appearance - alert, well appearing, and in no distress. Mental status - A/O x 4, normal mood and affect. Head/Eyes- AT/NC. DEE, EOMI, corneas normal, no foreign bodies. Ears- TM intact bilaterally, no erythema or drainage. Nose- Septum midline, pink mucosa. Turbinates normal, no polyps or erythema. No sinus tenderness. Mouth/Throat - mucous membranes moist, pharynx normal without lesions. No tonsillar swelling or exudates. Neck -Supple ,normal CSP. FROM, non-tender. No adenopathy/thyromegaly. No JVD. Chest - CTA.  Symmetric chest rise. No wheezing, rales or rhonchi. Heart - Normal rate, regular rhythm. Normal S1, S2. No MGR. Abdomen - Soft,non-distended. Normoactive BS in all quadrants. NT, no mass, rebound, or HSM   Ext- Radial, DP pulses, 2+ bilaterally. No pedal edema, clubbing, or cyanosis. Skin- Normal for ethnicity, warm, and dry. No hyperpigmentation, ulcerations, or suspicious lesions  Neuro - Normal speech, no focal findings. Normal strength and muscle tone. Coordination and gait normal.      Results for orders placed or performed in visit on 03/28/23   AMB POC URINALYSIS DIP STICK AUTO W/ MICRO   Result Value Ref Range    Color (UA POC) Yellow     Clarity (UA POC) Cloudy     Glucose (UA POC) Negative Negative    Bilirubin (UA POC) Negative Negative    Ketones (UA POC) Negative Negative    Specific gravity (UA POC) 1.025 1.001 - 1.035    Blood (UA POC) Negative Negative    pH (UA POC) 7.0 4.6 - 8.0    Protein (UA POC) Negative Negative    Urobilinogen (UA POC) 1 mg/dL 0.2 - 1    Nitrites (UA POC) Positive Negative    Leukocyte esterase (UA POC) Negative Negative         Assessment/Plan:  LSP ordered. Referred to PT. Diclofenac, robaxin, and tylenol. Prednisone 40 mg x 5 days. UC and Bactrim DS  Counseling/Anticipatory guidance reviewed with patient: yes  Medication Side Effects and Warnings were discussed with patient: yes   Patient Labs were reviewed: yes  Patient Past Records were reviewed: yes  See orders below  Follow-up and Dispositions    Return in about 8 weeks (around 5/23/2023) for VV- LBP/PT fu, lab/xray revew. ICD-10-CM ICD-9-CM    1. Adult general medical examination  C91.55 L72.1 METABOLIC PANEL, COMPREHENSIVE      CBC WITH AUTOMATED DIFF      HEMOGLOBIN A1C WITH EAG      LIPID PANEL      TSH 3RD GENERATION      REFERRAL TO HEMATOLOGY      VITAMIN D, 25 HYDROXY      2.  Chronic midline low back pain without sciatica  M54.50 724.2 XR SPINE LUMB 2 OR 3 V    G89.29 338.29 REFERRAL TO PHYSICAL THERAPY acetaminophen (TYLENOL) 500 mg tablet      diclofenac EC (VOLTAREN) 75 mg EC tablet      methocarbamoL (ROBAXIN) 500 mg tablet      predniSONE (DELTASONE) 20 mg tablet      3. Moderate persistent reactive airway disease without complication  S26.46 870.89 predniSONE (DELTASONE) 20 mg tablet      4. Acute cystitis without hematuria  N30.00 595.0 AMB POC URINALYSIS DIP STICK AUTO W/ MICRO      CULTURE, URINE      trimethoprim-sulfamethoxazole (Bactrim DS) 160-800 mg per tablet      phenazopyridine (Pyridium) 200 mg tablet      5. Easy bruising  R23.3 782.7 CBC WITH AUTOMATED DIFF      REFERRAL TO HEMATOLOGY      6. Vitamin D deficiency  E55.9 268.9 VITAMIN D, 25 HYDROXY      7. Screening for lipid disorders  Z13.220 V77.91 LIPID PANEL      8. Screening for endocrine, nutritional, metabolic and immunity disorder  C06.14 C51.97 METABOLIC PANEL, COMPREHENSIVE    Z13.21  CBC WITH AUTOMATED DIFF    Z13.228  HEMOGLOBIN A1C WITH EAG    Z13.0  TSH 3RD GENERATION        Orders Placed This Encounter    CULTURE, URINE     Standing Status:   Future     Standing Expiration Date:   9/28/2023    XR SPINE LUMB 2 OR 3 V     Standing Status:   Future     Standing Expiration Date:   4/27/2024     Order Specific Question:   Is Patient Pregnant?      Answer:   No     Order Specific Question:   Reason for Exam     Answer:   chronic low back pain    METABOLIC PANEL, COMPREHENSIVE     Standing Status:   Future     Number of Occurrences:   1     Standing Expiration Date:   9/28/2023    CBC WITH AUTOMATED DIFF     Standing Status:   Future     Number of Occurrences:   1     Standing Expiration Date:   9/28/2023    HEMOGLOBIN A1C WITH EAG     Standing Status:   Future     Number of Occurrences:   1     Standing Expiration Date:   9/28/2023    LIPID PANEL     Standing Status:   Future     Number of Occurrences:   1     Standing Expiration Date:   9/28/2023    TSH 3RD GENERATION     Standing Status:   Future     Number of Occurrences:   1 Standing Expiration Date:   9/28/2023    VITAMIN D, 25 HYDROXY     Standing Status:   Future     Number of Occurrences:   1     Standing Expiration Date:   9/28/2023    [facility] PT at [facility]     Referral Priority:   Routine     Referral Type:   PT/OT/ST     Referral Reason:   Specialty Services Required     Number of Visits Requested:   1    REFERRAL TO HEMATOLOGY     Referral Priority:   Routine     Referral Type:   Consultation     Referral Reason:   Specialty Services Required     Number of Visits Requested:   1    AMB POC URINALYSIS DIP STICK AUTO W/ MICRO    acetaminophen (TYLENOL) 500 mg tablet     Sig: Take 2 Tablets by mouth three (3) times daily as needed for Pain. Dispense:  100 Tablet     Refill:  11    diclofenac EC (VOLTAREN) 75 mg EC tablet     Sig: Take 1 Tablet by mouth two (2) times daily as needed for Pain. With food     Dispense:  60 Tablet     Refill:  2    methocarbamoL (ROBAXIN) 500 mg tablet     Sig: Take 1 Tablet by mouth four (4) times daily as needed for Muscle Spasm(s). Dispense:  40 Tablet     Refill:  2    predniSONE (DELTASONE) 20 mg tablet     Sig: Take 2 Tablets by mouth daily (with breakfast). Dispense:  10 Tablet     Refill:  0    trimethoprim-sulfamethoxazole (Bactrim DS) 160-800 mg per tablet     Sig: Take 1 Tablet by mouth two (2) times a day for 7 days. Dispense:  14 Tablet     Refill:  0    phenazopyridine (Pyridium) 200 mg tablet     Sig: Take 1 Tablet by mouth three (3) times daily for 2 days. Dispense:  6 Tablet     Refill:  0         Yue Galloway expressed understanding of plan. An After Visit Summary was offered/printed and given to the patient.

## 2023-03-29 LAB
25(OH)D3 SERPL-MCNC: 23.8 NG/ML (ref 30–100)
ALBUMIN SERPL-MCNC: 3.9 G/DL (ref 3.5–5)
ALBUMIN/GLOB SERPL: 1.1 (ref 1.1–2.2)
ALP SERPL-CCNC: 88 U/L (ref 45–117)
ALT SERPL-CCNC: 13 U/L (ref 12–78)
ANION GAP SERPL CALC-SCNC: 3 MMOL/L (ref 5–15)
AST SERPL-CCNC: 12 U/L (ref 15–37)
BASOPHILS # BLD: 0.1 K/UL (ref 0–0.1)
BASOPHILS NFR BLD: 1 % (ref 0–1)
BILIRUB SERPL-MCNC: 0.3 MG/DL (ref 0.2–1)
BUN SERPL-MCNC: 14 MG/DL (ref 6–20)
BUN/CREAT SERPL: 15 (ref 12–20)
CALCIUM SERPL-MCNC: 9.3 MG/DL (ref 8.5–10.1)
CHLORIDE SERPL-SCNC: 107 MMOL/L (ref 97–108)
CHOLEST SERPL-MCNC: 170 MG/DL
CO2 SERPL-SCNC: 26 MMOL/L (ref 21–32)
CREAT SERPL-MCNC: 0.91 MG/DL (ref 0.55–1.02)
DIFFERENTIAL METHOD BLD: NORMAL
EOSINOPHIL # BLD: 0.2 K/UL (ref 0–0.4)
EOSINOPHIL NFR BLD: 3 % (ref 0–7)
ERYTHROCYTE [DISTWIDTH] IN BLOOD BY AUTOMATED COUNT: 12.5 % (ref 11.5–14.5)
EST. AVERAGE GLUCOSE BLD GHB EST-MCNC: 120 MG/DL
GLOBULIN SER CALC-MCNC: 3.7 G/DL (ref 2–4)
GLUCOSE SERPL-MCNC: 94 MG/DL (ref 65–100)
HBA1C MFR BLD: 5.8 % (ref 4–5.6)
HCT VFR BLD AUTO: 42.7 % (ref 35–47)
HDLC SERPL-MCNC: 61 MG/DL
HDLC SERPL: 2.8 (ref 0–5)
HGB BLD-MCNC: 13.5 G/DL (ref 11.5–16)
IMM GRANULOCYTES # BLD AUTO: 0 K/UL (ref 0–0.04)
IMM GRANULOCYTES NFR BLD AUTO: 0 % (ref 0–0.5)
LDLC SERPL CALC-MCNC: 73.4 MG/DL (ref 0–100)
LYMPHOCYTES # BLD: 2.7 K/UL (ref 0.8–3.5)
LYMPHOCYTES NFR BLD: 37 % (ref 12–49)
MCH RBC QN AUTO: 29 PG (ref 26–34)
MCHC RBC AUTO-ENTMCNC: 31.6 G/DL (ref 30–36.5)
MCV RBC AUTO: 91.8 FL (ref 80–99)
MONOCYTES # BLD: 0.6 K/UL (ref 0–1)
MONOCYTES NFR BLD: 8 % (ref 5–13)
NEUTS SEG # BLD: 3.7 K/UL (ref 1.8–8)
NEUTS SEG NFR BLD: 51 % (ref 32–75)
NRBC # BLD: 0 K/UL (ref 0–0.01)
NRBC BLD-RTO: 0 PER 100 WBC
PLATELET # BLD AUTO: 264 K/UL (ref 150–400)
PMV BLD AUTO: 10.5 FL (ref 8.9–12.9)
POTASSIUM SERPL-SCNC: 4 MMOL/L (ref 3.5–5.1)
PROT SERPL-MCNC: 7.6 G/DL (ref 6.4–8.2)
RBC # BLD AUTO: 4.65 M/UL (ref 3.8–5.2)
SODIUM SERPL-SCNC: 136 MMOL/L (ref 136–145)
TRIGL SERPL-MCNC: 178 MG/DL (ref ?–150)
TSH SERPL DL<=0.05 MIU/L-ACNC: 1.74 UIU/ML (ref 0.36–3.74)
VLDLC SERPL CALC-MCNC: 35.6 MG/DL
WBC # BLD AUTO: 7.2 K/UL (ref 3.6–11)

## 2023-03-29 NOTE — PROGRESS NOTES
Overall your labs look good, but... Work on diet and exercise to lower your A1C, it is in the PRE-DIABETIC range, which places you at increased risk of developing DIABETES. Eating a low-carb diet and staying mindful of your sugar intake will help lower this number. Try Stevia instead of sugar, avoid sodas and candy. Use BROWN instead of WHITE (rice, pasta, bread), but overall aim to eat less of ALL of these. Also try eating 1 TSP of Ground laura and CINNAMON ([place]) DAILY in your food. May eat cinnamon over an apple, smoothie, or oatmeal. Laura is a great spice for your vegetables, meats, and soup.     Your vitamin D remains low, but IMPROVED, be sure to keep taking your supplement once weekly as prescribed

## 2023-03-31 LAB
BACTERIA SPEC CULT: ABNORMAL
CC UR VC: ABNORMAL
SERVICE CMNT-IMP: ABNORMAL

## 2023-12-08 RX ORDER — FUROSEMIDE 20 MG/1
TABLET ORAL
Qty: 15 TABLET | OUTPATIENT
Start: 2023-12-08

## 2024-01-24 ENCOUNTER — TELEMEDICINE (OUTPATIENT)
Facility: CLINIC | Age: 39
End: 2024-01-24
Payer: COMMERCIAL

## 2024-01-24 DIAGNOSIS — N76.0 BACTERIAL VAGINOSIS: Primary | ICD-10-CM

## 2024-01-24 DIAGNOSIS — B96.89 BACTERIAL VAGINOSIS: Primary | ICD-10-CM

## 2024-01-24 DIAGNOSIS — Z87.440 HISTORY OF UTI: ICD-10-CM

## 2024-01-24 DIAGNOSIS — R10.30 LOWER ABDOMINAL PAIN: ICD-10-CM

## 2024-01-24 DIAGNOSIS — M54.50 CHRONIC BILATERAL LOW BACK PAIN WITHOUT SCIATICA: ICD-10-CM

## 2024-01-24 DIAGNOSIS — G89.29 CHRONIC BILATERAL LOW BACK PAIN WITHOUT SCIATICA: ICD-10-CM

## 2024-01-24 LAB
BILIRUBIN, URINE, POC: NEGATIVE
BLOOD URINE, POC: NORMAL
GLUCOSE URINE, POC: NEGATIVE
KETONES, URINE, POC: NORMAL
LEUKOCYTE ESTERASE, URINE, POC: NEGATIVE
NITRITE, URINE, POC: POSITIVE
PH, URINE, POC: 6.5 (ref 4.6–8)
PROTEIN,URINE, POC: NORMAL
SPECIFIC GRAVITY, URINE, POC: 1.03 (ref 1–1.03)
URINALYSIS CLARITY, POC: CLEAR
URINALYSIS COLOR, POC: NORMAL
UROBILINOGEN, POC: NORMAL

## 2024-01-24 PROCEDURE — 81003 URINALYSIS AUTO W/O SCOPE: CPT | Performed by: NURSE PRACTITIONER

## 2024-01-24 PROCEDURE — 99213 OFFICE O/P EST LOW 20 MIN: CPT | Performed by: NURSE PRACTITIONER

## 2024-01-24 SDOH — ECONOMIC STABILITY: FOOD INSECURITY: WITHIN THE PAST 12 MONTHS, YOU WORRIED THAT YOUR FOOD WOULD RUN OUT BEFORE YOU GOT MONEY TO BUY MORE.: NEVER TRUE

## 2024-01-24 SDOH — ECONOMIC STABILITY: HOUSING INSECURITY
IN THE LAST 12 MONTHS, WAS THERE A TIME WHEN YOU DID NOT HAVE A STEADY PLACE TO SLEEP OR SLEPT IN A SHELTER (INCLUDING NOW)?: NO

## 2024-01-24 SDOH — ECONOMIC STABILITY: INCOME INSECURITY: HOW HARD IS IT FOR YOU TO PAY FOR THE VERY BASICS LIKE FOOD, HOUSING, MEDICAL CARE, AND HEATING?: NOT HARD AT ALL

## 2024-01-24 SDOH — ECONOMIC STABILITY: FOOD INSECURITY: WITHIN THE PAST 12 MONTHS, THE FOOD YOU BOUGHT JUST DIDN'T LAST AND YOU DIDN'T HAVE MONEY TO GET MORE.: NEVER TRUE

## 2024-01-24 ASSESSMENT — PATIENT HEALTH QUESTIONNAIRE - PHQ9
5. POOR APPETITE OR OVEREATING: 0
3. TROUBLE FALLING OR STAYING ASLEEP: 0
7. TROUBLE CONCENTRATING ON THINGS, SUCH AS READING THE NEWSPAPER OR WATCHING TELEVISION: 0
6. FEELING BAD ABOUT YOURSELF - OR THAT YOU ARE A FAILURE OR HAVE LET YOURSELF OR YOUR FAMILY DOWN: 0
4. FEELING TIRED OR HAVING LITTLE ENERGY: 0
10. IF YOU CHECKED OFF ANY PROBLEMS, HOW DIFFICULT HAVE THESE PROBLEMS MADE IT FOR YOU TO DO YOUR WORK, TAKE CARE OF THINGS AT HOME, OR GET ALONG WITH OTHER PEOPLE: 0
SUM OF ALL RESPONSES TO PHQ QUESTIONS 1-9: 0
SUM OF ALL RESPONSES TO PHQ QUESTIONS 1-9: 0
8. MOVING OR SPEAKING SO SLOWLY THAT OTHER PEOPLE COULD HAVE NOTICED. OR THE OPPOSITE, BEING SO FIGETY OR RESTLESS THAT YOU HAVE BEEN MOVING AROUND A LOT MORE THAN USUAL: 0
SUM OF ALL RESPONSES TO PHQ QUESTIONS 1-9: 0
2. FEELING DOWN, DEPRESSED OR HOPELESS: 0
SUM OF ALL RESPONSES TO PHQ9 QUESTIONS 1 & 2: 0
SUM OF ALL RESPONSES TO PHQ QUESTIONS 1-9: 0
9. THOUGHTS THAT YOU WOULD BE BETTER OFF DEAD, OR OF HURTING YOURSELF: 0
1. LITTLE INTEREST OR PLEASURE IN DOING THINGS: 0

## 2024-01-24 ASSESSMENT — ANXIETY QUESTIONNAIRES
7. FEELING AFRAID AS IF SOMETHING AWFUL MIGHT HAPPEN: 0
IF YOU CHECKED OFF ANY PROBLEMS ON THIS QUESTIONNAIRE, HOW DIFFICULT HAVE THESE PROBLEMS MADE IT FOR YOU TO DO YOUR WORK, TAKE CARE OF THINGS AT HOME, OR GET ALONG WITH OTHER PEOPLE: SOMEWHAT DIFFICULT
2. NOT BEING ABLE TO STOP OR CONTROL WORRYING: 1
GAD7 TOTAL SCORE: 2
5. BEING SO RESTLESS THAT IT IS HARD TO SIT STILL: 0
3. WORRYING TOO MUCH ABOUT DIFFERENT THINGS: 0
6. BECOMING EASILY ANNOYED OR IRRITABLE: 0
4. TROUBLE RELAXING: 0
1. FEELING NERVOUS, ANXIOUS, OR ON EDGE: 1

## 2024-01-24 NOTE — PROGRESS NOTES
Pat Miner is a 38 y.o. female Established patient, here for evaluation of the following chief complaint(s):   Follow-up (Patient continues to cramp maybe yeast infection from antibiotic given)          Assessment & Plan:   Below is the assessment and plan developed based on review of pertinent history, physical exam, labs, studies, and medications.    1. Bacterial vaginosis  New, recurrrent ? Repeat swab ordered.   - Nuswab Vaginitis Plus (VG+); Future    2. Lower abdominal pain  Recurrent, repeat UA to see if pt still has UTI present. Will send for Bactrim or Cipro instead.   - AMB POC URINALYSIS DIP STICK AUTO W/O MICRO    3. History of UTI  See #2, treated with macrobid earlier this month.   - AMB POC URINALYSIS DIP STICK AUTO W/O MICRO    4. Chronic bilateral low back pain without sciatica  Ongoing, never had imaging done or started PT. Asked to do so by next OV. Offered to participate in EBP also. Will send more info via emoquo.   - BS - Physical Therapy at [facility]  - XR LUMBAR SPINE (MIN 6 VIEWS); Future          Follow-up and Dispositions    Return for Physical with labs, LBP/LSP/PT fu.         Specific pt instructions until next visit: call if any problems    Subjective:   Pat Miner is a 38 y.o. female who was seen for Follow-up (Patient continues to cramp maybe yeast infection from antibiotic given)    Seen at [facility] ~ 3 weeks ago for lower abdominal cramping, dysuria, and low back pain. Diagnosed with BV and UTI. Dysuria and lower back pain has resolved, but still having some abd pain lately. Unsure if this is due being off Depo. LMP late Dec., early Jan. No urinary symptoms. Recalls taking flagyl and macrobid x 3 days.    Patient Active Problem List    Diagnosis Date Noted    Chronic midline low back pain without sciatica 03/28/2023    Reactive airway disease without complication 09/09/2022    Irregular menses 08/28/2018    Left foot pain 05/03/2018

## 2024-01-24 NOTE — PROGRESS NOTES
Pat Miner is a 38 y.o. female  HIPAA verified by two patient identifiers.   Chief Complaint   Patient presents with    Follow-up     Patient continues to cramp maybe yeast infection from antibiotic given         1/24/2024    10:46 AM   Patient-Reported Vitals   Patient-Reported Weight 177lb   Patient-Reported Height 5'5         Pain Scale: /10  Pain Location:   1. Have you been to the ER, urgent care clinic since your last visit?  Hospitalized since your last visit?2 weeks ago to [facility] for DX of vaginosis and UTI    2. Have you seen or consulted any other health care providers outside of the [facility] since your last visit?  Include any pap smears or colon screening. No

## 2024-01-27 LAB
BACTERIA SPEC CULT: ABNORMAL
CC UR VC: ABNORMAL
SERVICE CMNT-IMP: ABNORMAL

## 2024-01-28 LAB
A VAGINAE DNA VAG QL NAA+PROBE: NORMAL SCORE
BVAB2 DNA VAG QL NAA+PROBE: NORMAL SCORE
C ALBICANS DNA VAG QL NAA+PROBE: NEGATIVE
C GLABRATA DNA VAG QL NAA+PROBE: NEGATIVE
C TRACH RRNA SPEC QL NAA+PROBE: NEGATIVE
MEGA1 DNA VAG QL NAA+PROBE: NORMAL SCORE
N GONORRHOEA RRNA SPEC QL NAA+PROBE: NEGATIVE
SPECIMEN SOURCE: NORMAL
T VAGINALIS RRNA SPEC QL NAA+PROBE: NEGATIVE

## 2024-01-29 DIAGNOSIS — N39.0 E. COLI UTI: Primary | ICD-10-CM

## 2024-01-29 DIAGNOSIS — B96.20 E. COLI UTI: Primary | ICD-10-CM

## 2024-01-29 RX ORDER — SULFAMETHOXAZOLE AND TRIMETHOPRIM 800; 160 MG/1; MG/1
1 TABLET ORAL 2 TIMES DAILY
Qty: 10 TABLET | Refills: 0 | Status: SHIPPED | OUTPATIENT
Start: 2024-01-29 | End: 2024-02-03

## 2024-02-07 DIAGNOSIS — B37.9 ANTIBIOTIC-INDUCED YEAST INFECTION: Primary | ICD-10-CM

## 2024-02-07 DIAGNOSIS — T36.95XA ANTIBIOTIC-INDUCED YEAST INFECTION: Primary | ICD-10-CM

## 2024-02-07 RX ORDER — FLUCONAZOLE 150 MG/1
150 TABLET ORAL DAILY
Qty: 2 TABLET | Refills: 0 | Status: SHIPPED | OUTPATIENT
Start: 2024-02-07

## 2024-02-08 ENCOUNTER — HOSPITAL ENCOUNTER (EMERGENCY)
Facility: HOSPITAL | Age: 39
Discharge: HOME | End: 2024-02-08
Payer: COMMERCIAL

## 2024-02-08 VITALS
RESPIRATION RATE: 20 BRPM | WEIGHT: 194 LBS | TEMPERATURE: 100 F | BODY MASS INDEX: 32.32 KG/M2 | HEIGHT: 65 IN | HEART RATE: 64 BPM | DIASTOLIC BLOOD PRESSURE: 70 MMHG | SYSTOLIC BLOOD PRESSURE: 107 MMHG | OXYGEN SATURATION: 99 %

## 2024-02-08 DIAGNOSIS — M79.672 HEEL PAIN, BILATERAL: Primary | ICD-10-CM

## 2024-02-08 DIAGNOSIS — M79.671 HEEL PAIN, BILATERAL: Primary | ICD-10-CM

## 2024-02-08 PROCEDURE — 99284 EMERGENCY DEPT VISIT MOD MDM: CPT

## 2024-02-08 PROCEDURE — 6360000002 HC RX W HCPCS

## 2024-02-08 PROCEDURE — 96372 THER/PROPH/DIAG INJ SC/IM: CPT

## 2024-02-08 RX ORDER — DEXAMETHASONE SODIUM PHOSPHATE 10 MG/ML
6 INJECTION, SOLUTION INTRAMUSCULAR; INTRAVENOUS ONCE
Status: DISCONTINUED | OUTPATIENT
Start: 2024-02-08 | End: 2024-02-08

## 2024-02-08 RX ORDER — PREDNISONE 20 MG/1
40 TABLET ORAL DAILY
Qty: 10 TABLET | Refills: 0 | Status: SHIPPED | OUTPATIENT
Start: 2024-02-08 | End: 2024-02-13

## 2024-02-08 RX ORDER — CYCLOBENZAPRINE HCL 10 MG
10 TABLET ORAL 3 TIMES DAILY PRN
Qty: 30 TABLET | Refills: 0 | Status: SHIPPED | OUTPATIENT
Start: 2024-02-08 | End: 2024-02-18

## 2024-02-08 RX ORDER — SENNOSIDES 8.6 MG
650 CAPSULE ORAL EVERY 8 HOURS PRN
Qty: 60 TABLET | Refills: 0 | Status: SHIPPED | OUTPATIENT
Start: 2024-02-08

## 2024-02-08 RX ORDER — DEXAMETHASONE SODIUM PHOSPHATE 10 MG/ML
6 INJECTION, SOLUTION INTRAMUSCULAR; INTRAVENOUS ONCE
Status: COMPLETED | OUTPATIENT
Start: 2024-02-08 | End: 2024-02-08

## 2024-02-08 RX ADMIN — DEXAMETHASONE SODIUM PHOSPHATE 6 MG: 10 INJECTION, SOLUTION INTRAMUSCULAR; INTRAVENOUS at 14:17

## 2024-02-08 ASSESSMENT — PAIN SCALES - GENERAL: PAINLEVEL_OUTOF10: 10

## 2024-02-08 ASSESSMENT — PAIN DESCRIPTION - DESCRIPTORS: DESCRIPTORS: ACHING;SHARP;PRESSURE

## 2024-02-08 ASSESSMENT — PAIN - FUNCTIONAL ASSESSMENT: PAIN_FUNCTIONAL_ASSESSMENT: 0-10

## 2024-02-08 ASSESSMENT — PAIN DESCRIPTION - LOCATION: LOCATION: FOOT

## 2024-02-08 ASSESSMENT — PAIN DESCRIPTION - ORIENTATION: ORIENTATION: LEFT;RIGHT

## 2024-02-08 NOTE — ED TRIAGE NOTES
Pt reports bilateral heel/ foot pain x1 week. Pt reports working and standing for long periods of time. Pt reports taking otc pain relievers with no relief.

## 2024-02-08 NOTE — ED PROVIDER NOTES
and available for consultation if needed.     I am the Primary Clinician of Record.   ARLETH Valentin CNP (electronically signed)    (Please note that parts of this dictation were completed with voice recognition software. Quite often unanticipated grammatical, syntax, homophones, and other interpretive errors are inadvertently transcribed by the computer software. Please disregards these errors. Please excuse any errors that have escaped final proofreading.)        Joie Cordova, ARLETH Stokes CNP  02/08/24 9855

## 2024-02-18 ENCOUNTER — HOSPITAL ENCOUNTER (EMERGENCY)
Facility: HOSPITAL | Age: 39
Discharge: LEFT BEFORE DISCHARGE | End: 2024-02-18
Attending: EMERGENCY MEDICINE

## 2024-02-18 VITALS
WEIGHT: 180 LBS | HEART RATE: 74 BPM | OXYGEN SATURATION: 99 % | HEIGHT: 65 IN | SYSTOLIC BLOOD PRESSURE: 132 MMHG | BODY MASS INDEX: 29.99 KG/M2 | RESPIRATION RATE: 18 BRPM | DIASTOLIC BLOOD PRESSURE: 85 MMHG | TEMPERATURE: 98.3 F

## 2024-02-18 ASSESSMENT — PAIN - FUNCTIONAL ASSESSMENT: PAIN_FUNCTIONAL_ASSESSMENT: NONE - DENIES PAIN

## 2024-03-01 ENCOUNTER — OFFICE VISIT (OUTPATIENT)
Facility: CLINIC | Age: 39
End: 2024-03-01
Payer: COMMERCIAL

## 2024-03-01 VITALS
HEIGHT: 65 IN | RESPIRATION RATE: 19 BRPM | WEIGHT: 194 LBS | BODY MASS INDEX: 32.32 KG/M2 | SYSTOLIC BLOOD PRESSURE: 110 MMHG | OXYGEN SATURATION: 100 % | TEMPERATURE: 97.5 F | DIASTOLIC BLOOD PRESSURE: 85 MMHG | HEART RATE: 67 BPM

## 2024-03-01 DIAGNOSIS — M79.672 HEEL PAIN, BILATERAL: Primary | ICD-10-CM

## 2024-03-01 DIAGNOSIS — M25.471 ANKLE EDEMA, BILATERAL: ICD-10-CM

## 2024-03-01 DIAGNOSIS — M25.472 ANKLE EDEMA, BILATERAL: ICD-10-CM

## 2024-03-01 DIAGNOSIS — M79.671 HEEL PAIN, BILATERAL: Primary | ICD-10-CM

## 2024-03-01 PROCEDURE — 99213 OFFICE O/P EST LOW 20 MIN: CPT | Performed by: NURSE PRACTITIONER

## 2024-03-01 RX ORDER — IBUPROFEN 800 MG/1
800 TABLET ORAL EVERY 8 HOURS PRN
Qty: 60 TABLET | Refills: 5 | Status: SHIPPED | OUTPATIENT
Start: 2024-03-01

## 2024-03-01 NOTE — PROGRESS NOTES
6/10 bilateral foot pain    Pt is here for   Chief Complaint   Patient presents with    ED Follow-up     For bilateral foot pain.. pt states that she's been using insoles but no relief., with swelling, pt states that she was told by the Er that its not plantar fascitis     Referral - General     For Podiatry     1. Have you been to the ER, urgent care clinic since your last visit?  Hospitalized since your last visit?No    2. Have you seen or consulted any other health care providers outside of the [facility] since your last visit?  Include any pap smears or colon screening. No

## 2024-03-01 NOTE — PATIENT INSTRUCTIONS
Use heat for 20 minutes, followed by topical ointments like ROLL-ON Aleve, Aspercreme, Tiger Balm, or Capsaicin. May also use ASPERCREME with Lidocaine or Thermacare PAIN PATCHES, available over the counter. If pain continues take Tylenol arthritis up to 3 times daily for your pain. Use heat BEFORE activity/physical therapy and ice AFTER for about 20-30 minutes. May take NSAIDS, like Advil, Ibuprofen, or Aleve for moderate pain.

## 2024-03-01 NOTE — PROGRESS NOTES
Pat Miner 1985 is a 38 y.o. female, Established patient, here for evaluation of the following chief complaint(s):  ED Follow-up (For bilateral foot pain.. pt states that she's been using insoles but no relief., with swelling, pt states that she was told by the Er that its not plantar fascitis ) and Referral - General (For Podiatry)      ASSESSMENT/PLAN:  Below is the assessment and plan developed based on review of pertinent history, physical exam, labs, studies, and medications.    1. Heel pain, bilateral  New, imaging ordered and referred to ORTHO. Voltaren gel use advised and IBU as needed.   - XR CALCANEUS LEFT (MIN 2 VIEWS); Future  - XR CALCANEUS RIGHT (MIN 2 VIEWS); Future  - [facility] - Dimitris Bowen MD, Orthopedic Surgery (foot, ankle), [facility] ([facility])    2. Ankle edema, bilateral  New, use of compression stockings advised.             Follow-up and Dispositions    Return for Keep appt as scheduled, add heel pain to fu reason.         Pt asked to complete follow by next visit: Call if any problems    SUBJECTIVE/OBJECTIVE:  HPI    Pt presents to f/u bilateral heel pain.  Seen in ER on 2/8, given injection and oral steroids. No imaging. Told she didn't have plantar fasciitis, which she presumed she had due to working at [employer] with a lot of walking and standing. Taking APAP. Denies side effects from medication. Feels concerned for foot pain and swelling. Using shoe inserts in the mean time also. Pain Score:   6    Review of Systems  Constitutional: negative for fevers, chills, anorexia and weight loss  Respiratory:  negative for cough, hemoptysis, dyspnea, and wheezing  CV:   negative for chest pain, palpitations, and lower extremity edema  GI:   negative for nausea, vomiting, diarrhea, abdominal pain, and melena  Endo:               negative for polyuria,polydipsia,polyphagia, and heat intolerance  Genitourinary: negative for frequency, urgency, dysuria, retention, and hematuria  Integument:

## 2024-03-05 ENCOUNTER — HOSPITAL ENCOUNTER (EMERGENCY)
Facility: HOSPITAL | Age: 39
Discharge: HOME | End: 2024-03-05
Payer: COMMERCIAL

## 2024-03-05 VITALS
TEMPERATURE: 97.8 F | DIASTOLIC BLOOD PRESSURE: 73 MMHG | OXYGEN SATURATION: 99 % | HEART RATE: 60 BPM | SYSTOLIC BLOOD PRESSURE: 119 MMHG | HEIGHT: 65 IN | WEIGHT: 185 LBS | RESPIRATION RATE: 14 BRPM | BODY MASS INDEX: 30.82 KG/M2

## 2024-03-05 DIAGNOSIS — L23.0 ALLERGIC CONTACT DERMATITIS DUE TO METALS: Primary | ICD-10-CM

## 2024-03-05 PROCEDURE — 6370000000 HC RX 637 (ALT 250 FOR IP)

## 2024-03-05 PROCEDURE — 99283 EMERGENCY DEPT VISIT LOW MDM: CPT

## 2024-03-05 RX ORDER — PREDNISONE 20 MG/1
60 TABLET ORAL ONCE
Status: COMPLETED | OUTPATIENT
Start: 2024-03-05 | End: 2024-03-05

## 2024-03-05 RX ORDER — HYDROXYZINE HYDROCHLORIDE 25 MG/1
50 TABLET, FILM COATED ORAL ONCE
Status: COMPLETED | OUTPATIENT
Start: 2024-03-05 | End: 2024-03-05

## 2024-03-05 RX ORDER — PREDNISONE 20 MG/1
TABLET ORAL
Qty: 30 TABLET | Refills: 0 | Status: SHIPPED | OUTPATIENT
Start: 2024-03-05 | End: 2024-03-19

## 2024-03-05 RX ORDER — CETIRIZINE HYDROCHLORIDE 10 MG/1
10 TABLET ORAL EVERY MORNING
Qty: 30 TABLET | Refills: 0 | Status: SHIPPED | OUTPATIENT
Start: 2024-03-05

## 2024-03-05 RX ORDER — HYDROXYZINE 50 MG/1
50 TABLET, FILM COATED ORAL EVERY 8 HOURS PRN
Qty: 30 TABLET | Refills: 0 | Status: SHIPPED | OUTPATIENT
Start: 2024-03-05 | End: 2024-03-15

## 2024-03-05 RX ADMIN — HYDROXYZINE HYDROCHLORIDE 50 MG: 25 TABLET, FILM COATED ORAL at 20:54

## 2024-03-05 RX ADMIN — PREDNISONE 60 MG: 20 TABLET ORAL at 20:54

## 2024-03-05 ASSESSMENT — PAIN SCALES - GENERAL: PAINLEVEL_OUTOF10: 0

## 2024-03-05 ASSESSMENT — PAIN - FUNCTIONAL ASSESSMENT: PAIN_FUNCTIONAL_ASSESSMENT: 0-10

## 2024-03-06 NOTE — ED PROVIDER NOTES
of lukewarm water, use of gentle soaps, limit water-based lotions, refrain from excoriating affected sites, avoidance of allergens/irritants, wear loose/soft clothing, wash clothes/bedding in mild soaps, avoid sudden temperature changes, and reduce sun exposure.    Instructed Pt to f/up w/ PCP or ED should symptoms worsen or not improve. Pt verbally expressed understanding and all questions were addressed to Pt's satisfaction.      Disposition Considerations (Tests not done, Shared Decision Making, Pt Expectation of Test or Tx.): As above     FINAL IMPRESSION     1. 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 The signs, symptoms, diagnosis, and discharge instructions have been discussed, understanding conveyed, and agreed upon. The patient is to follow up as recommended or return to ER should their symptoms worsen.      PATIENT REFERRED TO:  Denice Benoit, ARLETH - NP  [address] [address]  [phone]    In 3 days      [facility] EMERGENCY DEPT  [address] [place] [address]  [phone]    If symptoms worsen       DISCHARGE MEDICATIONS:     Medication List        START taking these medications      cetirizine 10 MG tablet  Commonly known as: ZYRTEC  Take 1 tablet by mouth every morning     hydrOXYzine HCl 50 MG tablet  Commonly known as: ATARAX  Take 1 tablet by mouth every 8 hours as needed for Itching     predniSONE 20 MG tablet  Commonly known as: DELTASONE  Take 3 tablets by mouth daily for 3 days, THEN 2.5 tablets daily for 3 days, THEN 2 tablets daily for 3 days, THEN 1.5 tablets daily for 3 days, THEN 1 tablet daily for 3 days.  Start taking on: March 5, 2024            ASK your doctor about these medications      acetaminophen 650 MG extended release tablet  Commonly known as: Tylenol 8 Hour  Take 1 tablet by mouth every 8

## 2024-04-03 RX ORDER — ERGOCALCIFEROL 1.25 MG/1
50000 CAPSULE ORAL
Qty: 4 CAPSULE | Refills: 11 | Status: SHIPPED | OUTPATIENT
Start: 2024-04-03

## 2024-05-09 ENCOUNTER — OFFICE VISIT (OUTPATIENT)
Facility: CLINIC | Age: 39
End: 2024-05-09
Payer: COMMERCIAL

## 2024-05-09 VITALS
DIASTOLIC BLOOD PRESSURE: 73 MMHG | RESPIRATION RATE: 19 BRPM | HEART RATE: 79 BPM | HEIGHT: 65 IN | BODY MASS INDEX: 30.16 KG/M2 | OXYGEN SATURATION: 100 % | TEMPERATURE: 97 F | SYSTOLIC BLOOD PRESSURE: 105 MMHG | WEIGHT: 181 LBS

## 2024-05-09 DIAGNOSIS — H43.393 VITREOUS FLOATERS OF BOTH EYES: ICD-10-CM

## 2024-05-09 DIAGNOSIS — N93.9 VAGINAL SPOTTING: ICD-10-CM

## 2024-05-09 DIAGNOSIS — L70.0 CYSTIC ACNE: ICD-10-CM

## 2024-05-09 DIAGNOSIS — F45.41 STRESS HEADACHE: ICD-10-CM

## 2024-05-09 DIAGNOSIS — R10.2 PELVIC PRESSURE IN FEMALE: ICD-10-CM

## 2024-05-09 DIAGNOSIS — M54.50 BILATERAL LOW BACK PAIN WITHOUT SCIATICA, UNSPECIFIED CHRONICITY: ICD-10-CM

## 2024-05-09 DIAGNOSIS — Z30.42 ENCOUNTER FOR SURVEILLANCE OF INJECTABLE CONTRACEPTIVE: ICD-10-CM

## 2024-05-09 DIAGNOSIS — Z87.440 HISTORY OF UTI: ICD-10-CM

## 2024-05-09 DIAGNOSIS — M25.472 ANKLE EDEMA, BILATERAL: Primary | ICD-10-CM

## 2024-05-09 DIAGNOSIS — M25.471 ANKLE EDEMA, BILATERAL: Primary | ICD-10-CM

## 2024-05-09 LAB
BILIRUBIN, URINE, POC: NEGATIVE
BLOOD URINE, POC: NEGATIVE
GLUCOSE URINE, POC: NEGATIVE
HCG, PREGNANCY, URINE, POC: NEGATIVE
KETONES, URINE, POC: NEGATIVE
LEUKOCYTE ESTERASE, URINE, POC: NEGATIVE
NITRITE, URINE, POC: NEGATIVE
PH, URINE, POC: 6.5 (ref 4.6–8)
PROTEIN,URINE, POC: NEGATIVE
SPECIFIC GRAVITY, URINE, POC: 1.02 (ref 1–1.03)
URINALYSIS CLARITY, POC: CLEAR
URINALYSIS COLOR, POC: YELLOW
UROBILINOGEN, POC: NORMAL
VALID INTERNAL CONTROL, POC: YES

## 2024-05-09 PROCEDURE — 81025 URINE PREGNANCY TEST: CPT | Performed by: NURSE PRACTITIONER

## 2024-05-09 PROCEDURE — 99214 OFFICE O/P EST MOD 30 MIN: CPT | Performed by: NURSE PRACTITIONER

## 2024-05-09 PROCEDURE — 81001 URINALYSIS AUTO W/SCOPE: CPT | Performed by: NURSE PRACTITIONER

## 2024-05-09 RX ORDER — LIDOCAINE 50 MG/G
1 PATCH TOPICAL DAILY
Qty: 30 PATCH | Refills: 11 | Status: SHIPPED | OUTPATIENT
Start: 2024-05-09 | End: 2025-05-04

## 2024-05-09 RX ORDER — MEDROXYPROGESTERONE ACETATE 150 MG/ML
INJECTION, SUSPENSION INTRAMUSCULAR
Qty: 1 ML | Refills: 3 | Status: SHIPPED | OUTPATIENT
Start: 2024-05-09

## 2024-05-09 RX ORDER — BENZOYL PEROXIDE 44 MG/ML
LIQUID, EXTENDED RELEASE TOPICAL
Qty: 104 ML | Refills: 2 | Status: SHIPPED | OUTPATIENT
Start: 2024-05-09

## 2024-05-09 NOTE — PROGRESS NOTES
Pt is here for   Chief Complaint   Patient presents with    Urinary Tract Infection     Pt states that has Urinary pressure and frequency    Swelling     Pt states feet and ankles    Spots and/or Floaters     Pt states that she's been seeing floaters     Headache    Skin Problem     Pt states that she's been getting knots on her face, states that its not pimples    depo restart             \"Have you been to the ER, urgent care clinic since your last visit?  Hospitalized since your last visit?\"    NO    “Have you seen or consulted any other health care providers outside of [facility] since your last visit?”    NO            Click Here for Release of Records Request

## 2024-05-09 NOTE — PROGRESS NOTES
Pat Miner 1985 is a 38 y.o. female, Established patient, here for evaluation of the following chief complaint(s):  Urinary Tract Infection (Pt states that has Urinary pressure and frequency), Swelling (Pt states feet and ankles), Spots and/or Floaters (Pt states that she's been seeing floaters ), Headache, Skin Problem (Pt states that she's been getting knots on her face, states that its not pimples), and depo restart      ASSESSMENT/PLAN:  Below is the assessment and plan developed based on review of pertinent history, physical exam, labs, studies, and medications.     Diagnosis Orders   1. Ankle edema, bilateral        2. History of UTI  AMB POC URINALYSIS DIP STICK AUTO W/ MICRO    AMB POC URINE PREGNANCY TEST, VISUAL COLOR COMPARISON      3. Encounter for surveillance of injectable contraceptive  AMB POC URINALYSIS DIP STICK AUTO W/ MICRO    AMB POC URINE PREGNANCY TEST, VISUAL COLOR COMPARISON    medroxyPROGESTERone (DEPO-PROVERA) 150 MG/ML injection      4. Cystic acne  Benzoyl Peroxide ER (ADVANCED ACNE WASH) 4.4 % LQCR      5. Pelvic pressure in female        6. Bilateral low back pain without sciatica, unspecified chronicity  lidocaine (LIDODERM) 5 %      7. Vitreous floaters of both eyes        8. Stress headache        9. Vaginal spotting              MDM:UA neg, no culture ordered or tx for UTI. Suggested to use commode in a new way to empty bladder completely. Offered pelvic floor PT also. For acne, sent facial wash, but will likely resolve with DEPO restart, presumed hormonal. Edema r/t BMI and work, elevation, low salt, and continue compression advised.  If floaters persist after school ends, see ophthalmology for possible eye issue versus stress related issue. Also advised to check BP when having HA's and vision change to see if they are related as having vision issue today with NORMAL BP.      Current medication regimen is effective. See adjustments or prescriptions as noted above.

## 2024-05-09 NOTE — PATIENT INSTRUCTIONS
Elevate legs above the level of your heart while at rest to help reduce leg swelling. Also use compression stockings, available in medical supply and drug stores, to reduce swelling. Wear when you are standing or on your feet for long periods of time OR overnight if you are NOT active for the day.      Look for an EXTRA LARGE AUTOMATED BP cuff of [employer]. Start checking at least THREE TIMES WEEKLY. The goal reading is UNDER 140/90.

## 2024-05-23 ENCOUNTER — OFFICE VISIT (OUTPATIENT)
Facility: CLINIC | Age: 39
End: 2024-05-23
Payer: COMMERCIAL

## 2024-05-23 VITALS
OXYGEN SATURATION: 96 % | DIASTOLIC BLOOD PRESSURE: 68 MMHG | BODY MASS INDEX: 30.52 KG/M2 | HEIGHT: 65 IN | HEART RATE: 73 BPM | WEIGHT: 183.2 LBS | TEMPERATURE: 97.6 F | SYSTOLIC BLOOD PRESSURE: 94 MMHG

## 2024-05-23 DIAGNOSIS — Z00.00 ADULT GENERAL MEDICAL EXAMINATION: ICD-10-CM

## 2024-05-23 DIAGNOSIS — M25.471 ANKLE EDEMA, BILATERAL: ICD-10-CM

## 2024-05-23 DIAGNOSIS — Z13.220 SCREENING FOR LIPID DISORDERS: ICD-10-CM

## 2024-05-23 DIAGNOSIS — Z11.4 SCREENING FOR HIV WITHOUT PRESENCE OF RISK FACTORS: ICD-10-CM

## 2024-05-23 DIAGNOSIS — M25.472 ANKLE EDEMA, BILATERAL: ICD-10-CM

## 2024-05-23 DIAGNOSIS — Z00.00 ADULT GENERAL MEDICAL EXAMINATION: Primary | ICD-10-CM

## 2024-05-23 DIAGNOSIS — R73.03 PREDIABETES: ICD-10-CM

## 2024-05-23 DIAGNOSIS — J45.20 MILD INTERMITTENT REACTIVE AIRWAY DISEASE WITHOUT COMPLICATION: ICD-10-CM

## 2024-05-23 DIAGNOSIS — Z32.01 POSITIVE PREGNANCY TEST: ICD-10-CM

## 2024-05-23 DIAGNOSIS — E55.9 VITAMIN D DEFICIENCY: ICD-10-CM

## 2024-05-23 LAB
HCG, PREGNANCY, URINE, POC: POSITIVE
VALID INTERNAL CONTROL, POC: YES

## 2024-05-23 PROCEDURE — 81025 URINE PREGNANCY TEST: CPT | Performed by: NURSE PRACTITIONER

## 2024-05-23 PROCEDURE — 99395 PREV VISIT EST AGE 18-39: CPT | Performed by: NURSE PRACTITIONER

## 2024-05-23 RX ORDER — ALBUTEROL SULFATE 90 UG/1
1-2 AEROSOL, METERED RESPIRATORY (INHALATION) EVERY 4 HOURS PRN
Qty: 18 G | Refills: 1 | Status: SHIPPED | OUTPATIENT
Start: 2024-05-23

## 2024-05-23 SDOH — ECONOMIC STABILITY: FOOD INSECURITY: WITHIN THE PAST 12 MONTHS, THE FOOD YOU BOUGHT JUST DIDN'T LAST AND YOU DIDN'T HAVE MONEY TO GET MORE.: NEVER TRUE

## 2024-05-23 SDOH — ECONOMIC STABILITY: FOOD INSECURITY: WITHIN THE PAST 12 MONTHS, YOU WORRIED THAT YOUR FOOD WOULD RUN OUT BEFORE YOU GOT MONEY TO BUY MORE.: NEVER TRUE

## 2024-05-23 SDOH — ECONOMIC STABILITY: INCOME INSECURITY: HOW HARD IS IT FOR YOU TO PAY FOR THE VERY BASICS LIKE FOOD, HOUSING, MEDICAL CARE, AND HEATING?: NOT HARD AT ALL

## 2024-05-23 NOTE — PROGRESS NOTES
Pat Miner is a 38 y.o. female  Chief Complaint   Patient presents with    Annual Exam     Routine       
patient.

## 2024-05-24 LAB
25(OH)D3 SERPL-MCNC: 22.9 NG/ML (ref 30–100)
ALBUMIN SERPL-MCNC: 3.7 G/DL (ref 3.5–5)
ALBUMIN/GLOB SERPL: 1.1 (ref 1.1–2.2)
ALP SERPL-CCNC: 77 U/L (ref 45–117)
ANION GAP SERPL CALC-SCNC: 3 MMOL/L (ref 5–15)
AST SERPL-CCNC: 9 U/L (ref 15–37)
BASOPHILS # BLD: 0.1 K/UL (ref 0–0.1)
BASOPHILS NFR BLD: 1 % (ref 0–1)
BILIRUB SERPL-MCNC: 0.4 MG/DL (ref 0.2–1)
BUN SERPL-MCNC: 7 MG/DL (ref 6–20)
BUN/CREAT SERPL: 7 (ref 12–20)
CALCIUM SERPL-MCNC: 9.4 MG/DL (ref 8.5–10.1)
CHLORIDE SERPL-SCNC: 107 MMOL/L (ref 97–108)
CHOLEST SERPL-MCNC: 179 MG/DL
CO2 SERPL-SCNC: 28 MMOL/L (ref 21–32)
CREAT SERPL-MCNC: 1 MG/DL (ref 0.55–1.02)
DIFFERENTIAL METHOD BLD: NORMAL
EOSINOPHIL # BLD: 0.1 K/UL (ref 0–0.4)
EOSINOPHIL NFR BLD: 2 % (ref 0–7)
ERYTHROCYTE [DISTWIDTH] IN BLOOD BY AUTOMATED COUNT: 12.7 % (ref 11.5–14.5)
ERYTHROCYTE [SEDIMENTATION RATE] IN BLOOD: 13 MM/HR (ref 0–20)
EST. AVERAGE GLUCOSE BLD GHB EST-MCNC: 114 MG/DL
GLOBULIN SER CALC-MCNC: 3.4 G/DL (ref 2–4)
GLUCOSE SERPL-MCNC: 78 MG/DL (ref 65–100)
HBA1C MFR BLD: 5.6 % (ref 4–5.6)
HCT VFR BLD AUTO: 41.1 % (ref 35–47)
HDLC SERPL-MCNC: 61 MG/DL
HDLC SERPL: 2.9 (ref 0–5)
HGB BLD-MCNC: 13.4 G/DL (ref 11.5–16)
HIV 1+2 AB+HIV1 P24 AG SERPL QL IA: NONREACTIVE
HIV 1/2 RESULT COMMENT: NORMAL
IMM GRANULOCYTES # BLD AUTO: 0 K/UL (ref 0–0.04)
IMM GRANULOCYTES NFR BLD AUTO: 0 % (ref 0–0.5)
LDLC SERPL CALC-MCNC: 100.8 MG/DL (ref 0–100)
LYMPHOCYTES # BLD: 1.8 K/UL (ref 0.8–3.5)
LYMPHOCYTES NFR BLD: 29 % (ref 12–49)
MCH RBC QN AUTO: 29.3 PG (ref 26–34)
MCHC RBC AUTO-ENTMCNC: 32.6 G/DL (ref 30–36.5)
MCV RBC AUTO: 89.9 FL (ref 80–99)
MONOCYTES # BLD: 0.6 K/UL (ref 0–1)
MONOCYTES NFR BLD: 9 % (ref 5–13)
NEUTS SEG # BLD: 3.6 K/UL (ref 1.8–8)
NEUTS SEG NFR BLD: 59 % (ref 32–75)
NRBC # BLD: 0 K/UL (ref 0–0.01)
NRBC BLD-RTO: 0 PER 100 WBC
PLATELET # BLD AUTO: 290 K/UL (ref 150–400)
PMV BLD AUTO: 10.4 FL (ref 8.9–12.9)
POTASSIUM SERPL-SCNC: 4.1 MMOL/L (ref 3.5–5.1)
PROT SERPL-MCNC: 7.1 G/DL (ref 6.4–8.2)
RBC # BLD AUTO: 4.57 M/UL (ref 3.8–5.2)
SODIUM SERPL-SCNC: 138 MMOL/L (ref 136–145)
TRIGL SERPL-MCNC: 86 MG/DL
URATE SERPL-MCNC: 3.6 MG/DL (ref 2.6–6)
VLDLC SERPL CALC-MCNC: 17.2 MG/DL
WBC # BLD AUTO: 6.1 K/UL (ref 3.6–11)

## 2024-05-25 LAB — ANA SER QL: NEGATIVE

## 2024-05-27 NOTE — RESULT ENCOUNTER NOTE
Overall your labs look good, but...    Make sure you are taking at least 2000 international units of Vitamin D3 DAILY. Also getting at least one hour of direct sunlight helps raise your vitamin D level.    Your CHOLESTEROL is UP, with your LDL (bad cholesterol) over  goal level of 100 and HDL (good/protective) under 60. Stay active, eat a LOW-CARB diet (avoiding bread, rice, pasta, desserts, soda)  with fish and other lean meats, take a fish oil supplement with DHA and EPA daily, along with exercise to help reduce cholesterol and raise good cholesterol. Eating healthy nuts like walnuts, pecans, and ALMONDS may help also. You can also try PURPLE SEA DOLAN (found in health food stores like [employer] and [employer]). TRY RED YEAST RICE (if your LDL is HIGH) OR Flush-free NIACIN 500mg (if your TRIG or up), once daily helps to improve your numbers too. Do NOT take Niacin or red yeast rice together, they interact with each other. Both are intended to be taken WITH FISH OIL however.

## 2024-05-31 LAB
14-3-3 ETA AG SER IA-MCNC: <0.2 NG/ML
CCP IGA+IGG SERPL IA-ACNC: <20 UNITS
RHEUMATOID FACT SERPL-ACNC: <14 UNITS/ML

## 2024-06-04 ENCOUNTER — HOSPITAL ENCOUNTER (EMERGENCY)
Facility: HOSPITAL | Age: 39
Discharge: HOME | End: 2024-06-04
Attending: EMERGENCY MEDICINE
Payer: COMMERCIAL

## 2024-06-04 VITALS
HEART RATE: 76 BPM | RESPIRATION RATE: 19 BRPM | WEIGHT: 181 LBS | SYSTOLIC BLOOD PRESSURE: 108 MMHG | BODY MASS INDEX: 30.16 KG/M2 | TEMPERATURE: 98 F | OXYGEN SATURATION: 99 % | DIASTOLIC BLOOD PRESSURE: 79 MMHG | HEIGHT: 65 IN

## 2024-06-04 DIAGNOSIS — J01.90 ACUTE NON-RECURRENT SINUSITIS, UNSPECIFIED LOCATION: ICD-10-CM

## 2024-06-04 DIAGNOSIS — H66.002 NON-RECURRENT ACUTE SUPPURATIVE OTITIS MEDIA OF LEFT EAR WITHOUT SPONTANEOUS RUPTURE OF TYMPANIC MEMBRANE: Primary | ICD-10-CM

## 2024-06-04 DIAGNOSIS — Z72.0 TOBACCO ABUSE: ICD-10-CM

## 2024-06-04 DIAGNOSIS — Z34.90 EARLY STAGE OF PREGNANCY: ICD-10-CM

## 2024-06-04 PROCEDURE — 6370000000 HC RX 637 (ALT 250 FOR IP): Performed by: EMERGENCY MEDICINE

## 2024-06-04 PROCEDURE — 99283 EMERGENCY DEPT VISIT LOW MDM: CPT

## 2024-06-04 RX ORDER — PREDNISONE 5 MG/1
TABLET ORAL
Qty: 21 EACH | Refills: 0 | Status: SHIPPED | OUTPATIENT
Start: 2024-06-04

## 2024-06-04 RX ORDER — AMOXICILLIN AND CLAVULANATE POTASSIUM 875; 125 MG/1; MG/1
1 TABLET, FILM COATED ORAL 2 TIMES DAILY
Qty: 14 TABLET | Refills: 0 | Status: SHIPPED | OUTPATIENT
Start: 2024-06-04 | End: 2024-06-11

## 2024-06-04 RX ORDER — AMOXICILLIN AND CLAVULANATE POTASSIUM 875; 125 MG/1; MG/1
1 TABLET, FILM COATED ORAL
Status: COMPLETED | OUTPATIENT
Start: 2024-06-04 | End: 2024-06-04

## 2024-06-04 RX ORDER — OXYMETAZOLINE HYDROCHLORIDE 0.05 G/100ML
2 SPRAY NASAL ONCE
Status: COMPLETED | OUTPATIENT
Start: 2024-06-04 | End: 2024-06-04

## 2024-06-04 RX ADMIN — OXYMETAZOLINE HYDROCHLORIDE 2 SPRAY: 0.05 SPRAY, METERED NASAL at 05:02

## 2024-06-04 RX ADMIN — AMOXICILLIN AND CLAVULANATE POTASSIUM 1 TABLET: 875; 125 TABLET, FILM COATED ORAL at 05:02

## 2024-06-04 ASSESSMENT — ENCOUNTER SYMPTOMS
SHORTNESS OF BREATH: 0
DIARRHEA: 0
VOMITING: 0
COUGH: 0
EYE PAIN: 0
SINUS PRESSURE: 1
ABDOMINAL PAIN: 0
NAUSEA: 0
RHINORRHEA: 1
SORE THROAT: 0

## 2024-06-04 ASSESSMENT — PAIN SCALES - GENERAL: PAINLEVEL_OUTOF10: 7

## 2024-06-04 ASSESSMENT — PAIN DESCRIPTION - DESCRIPTORS: DESCRIPTORS: SHARP

## 2024-06-04 ASSESSMENT — PAIN - FUNCTIONAL ASSESSMENT: PAIN_FUNCTIONAL_ASSESSMENT: 0-10

## 2024-06-04 ASSESSMENT — PAIN DESCRIPTION - LOCATION: LOCATION: EAR;FACE

## 2024-06-04 NOTE — ED TRIAGE NOTES
Pt presents to ED with c/o left ear pain radiating to neck with swelling x1 day  Pt states she had confirmed pregnancy end of May at PCPs and has been following up at [facility]. Pt denies abd pain, denies vaginal bleeding.  Pt reports taking Tylenol approx 20 hrs ago with no relief

## 2024-06-04 NOTE — ED PROVIDER NOTES
range of motion.   Skin:     General: Skin is warm.   Neurological:      General: No focal deficit present.      Mental Status: She is alert. Mental status is at baseline.   Psychiatric:         Mood and Affect: Mood normal.       Diagnostic Studies     LABORATORY RESULTS:  I have personally reviewed and interpreted all available laboratory results.   No results found for this or any previous visit (from the past 24 hour(s)).    RADIOLOGY RESULTS:  I have personally reviewed and interpreted all available imaging studies and agree with radiology interpretation.  No orders to display       No orders to display                MEDICAL DECISION MAKING & ED COURSE   I am the first and primary ED physician for this patient's ED visit today.    I reviewed our EMR for any past records that may contribute to the patient's current condition, including their past medical, surgical, social and family history. This also includes their most recent ED visits, previous hospitalizations and prior diagnostic data. I have reviewed and summarized the most pertinent findings in my HPI and MDM.    Vital Signs Reviewed:  Patient Vitals for the past 24 hrs:   Temp Pulse Resp BP SpO2   06/04/24 0438 98 °F (36.7 °C) 76 19 108/79 99 %     Pulse Oximetry Analysis: 99% on RA with good pleth    Cardiac Monitor:   Rate: 76 bpm  The cardiac monitor revealed the following rhythm as interpreted by me: Normal Sinus Rhythm  Cardiac and pulse ox monitoring were ordered to monitor patient for signs of cardiac dysrhythmia, which they are at risk for based on their history and/or risk for cardiovascular disease and/or metabolic abnormalities.     Records Reviewed: Nursing Notes, Old Medical Records, Previous electrocardiograms, Previous Radiology Studies and Previous Laboratory Studies, EMS reports    DIFFERENTIAL DIAGNOSIS AND PLAN:  #Well-appearing patient presenting with acute left ear pain; Ddx: otitis media, otitis externa, cerumen impaction,

## 2024-06-17 ENCOUNTER — ANESTHESIA EVENT (OUTPATIENT)
Facility: HOSPITAL | Age: 39
End: 2024-06-17
Payer: COMMERCIAL

## 2024-06-17 RX ORDER — ALPRAZOLAM 1 MG
1 TABLET ORAL 2 TIMES DAILY PRN
COMMUNITY

## 2024-06-17 NOTE — PERIOP NOTE
[facility]  Ambulatory Surgery Unit  Pre-operative Instructions    Surgery/Procedure Date  6/18            Tentative Arrival Time 08:45am      1. On the day of your surgery/procedure, please report to the Ambulatory Surgery Unit Registration Desk and sign in at your designated time. The Ambulatory Surgery Unit is located in [facility] on the [facility] of the [facility] across from the [facility]. Please have all of your health insurance cards, co-payment, and a photo ID.    **TWO adults may accompany you the day of the procedure.  We have limited seating available.      2. You cannot be dropped off for surgery.  Please make arrangements for a responsible adult friend or family member to remain on the hospital campus during your procedure, and drive you home, as you should not drive for 24 hours following anesthesia. Make arrangements for a responsible adult to stay with you for at least the first 24 hours after your surgery.    3. Do not have anything to eat or drink (including water, gum, mints, coffee, juice) after 11:59 PM  6/17. This may not apply to medications prescribed by your physician.  (Please note below the special instructions with medications to take the morning of surgery, if applicable.)    4. We recommend you do not drink any alcoholic beverages for 24 hours before and after your surgery.    5. Contact your surgeon’s office for instructions on the following medications: non-steroidal anti-inflammatory drugs (i.e. Advil, Aleve), vitamins, and supplements. (Some surgeon’s will want you to stop these medications prior to surgery and others may allow you to take them)   **If you are currently taking Plavix, Coumadin, Aspirin and/or other blood-thinning agents, contact your surgeon for instructions.** Your surgeon will partner with the physician prescribing these medications to determine if it is safe to stop or if you need to continue taking. Please do not stop

## 2024-06-17 NOTE — PERIOP NOTE
Call from Michelle at Dr. Noguera's office and stated that patient is B negative and Rhogam was given of 5/31/2024. Requested for her to send documentation to our office, that pateint has received Rhogam injection.

## 2024-06-17 NOTE — PERIOP NOTE

## 2024-06-18 ENCOUNTER — ANESTHESIA (OUTPATIENT)
Facility: HOSPITAL | Age: 39
End: 2024-06-18
Payer: COMMERCIAL

## 2024-06-18 ENCOUNTER — HOSPITAL ENCOUNTER (OUTPATIENT)
Facility: HOSPITAL | Age: 39
Setting detail: OUTPATIENT SURGERY
Discharge: HOME | End: 2024-06-18
Attending: OBSTETRICS & GYNECOLOGY | Admitting: OBSTETRICS & GYNECOLOGY
Payer: COMMERCIAL

## 2024-06-18 VITALS
DIASTOLIC BLOOD PRESSURE: 70 MMHG | OXYGEN SATURATION: 99 % | BODY MASS INDEX: 30.66 KG/M2 | WEIGHT: 184 LBS | HEIGHT: 65 IN | SYSTOLIC BLOOD PRESSURE: 113 MMHG | HEART RATE: 58 BPM | RESPIRATION RATE: 19 BRPM | TEMPERATURE: 97.7 F

## 2024-06-18 DIAGNOSIS — G89.18 POST-OP PAIN: Primary | ICD-10-CM

## 2024-06-18 PROCEDURE — 3700000000 HC ANESTHESIA ATTENDED CARE: Performed by: OBSTETRICS & GYNECOLOGY

## 2024-06-18 PROCEDURE — 7100000001 HC PACU RECOVERY - ADDTL 15 MIN: Performed by: OBSTETRICS & GYNECOLOGY

## 2024-06-18 PROCEDURE — 6370000000 HC RX 637 (ALT 250 FOR IP)

## 2024-06-18 PROCEDURE — 7100000010 HC PHASE II RECOVERY - FIRST 15 MIN: Performed by: OBSTETRICS & GYNECOLOGY

## 2024-06-18 PROCEDURE — 3600000012 HC SURGERY LEVEL 2 ADDTL 15MIN: Performed by: OBSTETRICS & GYNECOLOGY

## 2024-06-18 PROCEDURE — 2709999900 HC NON-CHARGEABLE SUPPLY: Performed by: OBSTETRICS & GYNECOLOGY

## 2024-06-18 PROCEDURE — 3700000001 HC ADD 15 MINUTES (ANESTHESIA): Performed by: OBSTETRICS & GYNECOLOGY

## 2024-06-18 PROCEDURE — 2500000003 HC RX 250 WO HCPCS: Performed by: OBSTETRICS & GYNECOLOGY

## 2024-06-18 PROCEDURE — 7100000000 HC PACU RECOVERY - FIRST 15 MIN: Performed by: OBSTETRICS & GYNECOLOGY

## 2024-06-18 PROCEDURE — 88305 TISSUE EXAM BY PATHOLOGIST: CPT

## 2024-06-18 PROCEDURE — 3600000002 HC SURGERY LEVEL 2 BASE: Performed by: OBSTETRICS & GYNECOLOGY

## 2024-06-18 PROCEDURE — 2500000003 HC RX 250 WO HCPCS: Performed by: REGISTERED NURSE

## 2024-06-18 PROCEDURE — 2580000003 HC RX 258: Performed by: STUDENT IN AN ORGANIZED HEALTH CARE EDUCATION/TRAINING PROGRAM

## 2024-06-18 PROCEDURE — 6360000002 HC RX W HCPCS: Performed by: REGISTERED NURSE

## 2024-06-18 RX ORDER — DEXAMETHASONE SODIUM PHOSPHATE 4 MG/ML
INJECTION, SOLUTION INTRA-ARTICULAR; INTRALESIONAL; INTRAMUSCULAR; INTRAVENOUS; SOFT TISSUE PRN
Status: DISCONTINUED | OUTPATIENT
Start: 2024-06-18 | End: 2024-06-18 | Stop reason: SDUPTHER

## 2024-06-18 RX ORDER — MIDAZOLAM HYDROCHLORIDE 1 MG/ML
INJECTION INTRAMUSCULAR; INTRAVENOUS PRN
Status: DISCONTINUED | OUTPATIENT
Start: 2024-06-18 | End: 2024-06-18 | Stop reason: SDUPTHER

## 2024-06-18 RX ORDER — SODIUM CHLORIDE 0.9 % (FLUSH) 0.9 %
5-40 SYRINGE (ML) INJECTION PRN
Status: DISCONTINUED | OUTPATIENT
Start: 2024-06-18 | End: 2024-06-18 | Stop reason: HOSPADM

## 2024-06-18 RX ORDER — SODIUM CHLORIDE 0.9 % (FLUSH) 0.9 %
5-40 SYRINGE (ML) INJECTION EVERY 12 HOURS SCHEDULED
Status: DISCONTINUED | OUTPATIENT
Start: 2024-06-18 | End: 2024-06-18 | Stop reason: HOSPADM

## 2024-06-18 RX ORDER — SODIUM CHLORIDE, SODIUM LACTATE, POTASSIUM CHLORIDE, CALCIUM CHLORIDE 600; 310; 30; 20 MG/100ML; MG/100ML; MG/100ML; MG/100ML
INJECTION, SOLUTION INTRAVENOUS CONTINUOUS
Status: DISCONTINUED | OUTPATIENT
Start: 2024-06-18 | End: 2024-06-18 | Stop reason: HOSPADM

## 2024-06-18 RX ORDER — ONDANSETRON 2 MG/ML
INJECTION INTRAMUSCULAR; INTRAVENOUS PRN
Status: DISCONTINUED | OUTPATIENT
Start: 2024-06-18 | End: 2024-06-18 | Stop reason: SDUPTHER

## 2024-06-18 RX ORDER — IBUPROFEN 800 MG/1
800 TABLET ORAL EVERY 8 HOURS PRN
Qty: 30 TABLET | Refills: 1 | Status: SHIPPED | OUTPATIENT
Start: 2024-06-18

## 2024-06-18 RX ORDER — OXYCODONE HYDROCHLORIDE 5 MG/1
5 TABLET ORAL
Status: DISCONTINUED | OUTPATIENT
Start: 2024-06-18 | End: 2024-06-18 | Stop reason: HOSPADM

## 2024-06-18 RX ORDER — LIDOCAINE HYDROCHLORIDE 10 MG/ML
INJECTION, SOLUTION INFILTRATION; PERINEURAL PRN
Status: DISCONTINUED | OUTPATIENT
Start: 2024-06-18 | End: 2024-06-18 | Stop reason: ALTCHOICE

## 2024-06-18 RX ORDER — ACETAMINOPHEN 500 MG
TABLET ORAL
Status: COMPLETED
Start: 2024-06-18 | End: 2024-06-18

## 2024-06-18 RX ORDER — ACETAMINOPHEN 500 MG
1000 TABLET ORAL ONCE
Status: COMPLETED | OUTPATIENT
Start: 2024-06-18 | End: 2024-06-18

## 2024-06-18 RX ORDER — DOXYCYCLINE HYCLATE 100 MG
TABLET ORAL
Status: COMPLETED
Start: 2024-06-18 | End: 2024-06-18

## 2024-06-18 RX ORDER — FENTANYL CITRATE 50 UG/ML
25 INJECTION, SOLUTION INTRAMUSCULAR; INTRAVENOUS EVERY 5 MIN PRN
Status: DISCONTINUED | OUTPATIENT
Start: 2024-06-18 | End: 2024-06-18 | Stop reason: HOSPADM

## 2024-06-18 RX ORDER — ONDANSETRON 2 MG/ML
4 INJECTION INTRAMUSCULAR; INTRAVENOUS
Status: DISCONTINUED | OUTPATIENT
Start: 2024-06-18 | End: 2024-06-18 | Stop reason: HOSPADM

## 2024-06-18 RX ORDER — HYDROMORPHONE HYDROCHLORIDE 1 MG/ML
0.5 INJECTION, SOLUTION INTRAMUSCULAR; INTRAVENOUS; SUBCUTANEOUS EVERY 5 MIN PRN
Status: DISCONTINUED | OUTPATIENT
Start: 2024-06-18 | End: 2024-06-18 | Stop reason: HOSPADM

## 2024-06-18 RX ORDER — DOXYCYCLINE HYCLATE 100 MG
200 TABLET ORAL ONCE
Status: COMPLETED | OUTPATIENT
Start: 2024-06-18 | End: 2024-06-18

## 2024-06-18 RX ORDER — KETOROLAC TROMETHAMINE 30 MG/ML
INJECTION, SOLUTION INTRAMUSCULAR; INTRAVENOUS PRN
Status: DISCONTINUED | OUTPATIENT
Start: 2024-06-18 | End: 2024-06-18 | Stop reason: SDUPTHER

## 2024-06-18 RX ORDER — OXYCODONE HYDROCHLORIDE 5 MG/1
5 TABLET ORAL EVERY 6 HOURS PRN
Qty: 6 TABLET | Refills: 0 | Status: SHIPPED | OUTPATIENT
Start: 2024-06-18 | End: 2024-06-21

## 2024-06-18 RX ORDER — SODIUM CHLORIDE 9 MG/ML
INJECTION, SOLUTION INTRAVENOUS PRN
Status: DISCONTINUED | OUTPATIENT
Start: 2024-06-18 | End: 2024-06-18 | Stop reason: HOSPADM

## 2024-06-18 RX ORDER — LIDOCAINE HYDROCHLORIDE 10 MG/ML
1 INJECTION, SOLUTION EPIDURAL; INFILTRATION; INTRACAUDAL; PERINEURAL
Status: DISCONTINUED | OUTPATIENT
Start: 2024-06-18 | End: 2024-06-18 | Stop reason: HOSPADM

## 2024-06-18 RX ORDER — FENTANYL CITRATE 50 UG/ML
INJECTION, SOLUTION INTRAMUSCULAR; INTRAVENOUS PRN
Status: DISCONTINUED | OUTPATIENT
Start: 2024-06-18 | End: 2024-06-18 | Stop reason: SDUPTHER

## 2024-06-18 RX ORDER — NALOXONE HYDROCHLORIDE 0.4 MG/ML
INJECTION, SOLUTION INTRAMUSCULAR; INTRAVENOUS; SUBCUTANEOUS PRN
Status: DISCONTINUED | OUTPATIENT
Start: 2024-06-18 | End: 2024-06-18 | Stop reason: HOSPADM

## 2024-06-18 RX ADMIN — PROPOFOL 40 MG: 10 INJECTION, EMULSION INTRAVENOUS at 10:28

## 2024-06-18 RX ADMIN — Medication 1000 MG: at 09:29

## 2024-06-18 RX ADMIN — ACETAMINOPHEN 1000 MG: 500 TABLET ORAL at 09:29

## 2024-06-18 RX ADMIN — Medication 200 MG: at 10:18

## 2024-06-18 RX ADMIN — ONDANSETRON 4 MG: 2 INJECTION INTRAMUSCULAR; INTRAVENOUS at 10:23

## 2024-06-18 RX ADMIN — PROPOFOL 125 MCG/KG/MIN: 10 INJECTION, EMULSION INTRAVENOUS at 10:18

## 2024-06-18 RX ADMIN — DEXAMETHASONE SODIUM PHOSPHATE 8 MG: 4 INJECTION, SOLUTION INTRAMUSCULAR; INTRAVENOUS at 10:23

## 2024-06-18 RX ADMIN — SODIUM CHLORIDE, POTASSIUM CHLORIDE, SODIUM LACTATE AND CALCIUM CHLORIDE: 600; 310; 30; 20 INJECTION, SOLUTION INTRAVENOUS at 09:30

## 2024-06-18 RX ADMIN — PROPOFOL 80 MG: 10 INJECTION, EMULSION INTRAVENOUS at 10:17

## 2024-06-18 RX ADMIN — KETOROLAC TROMETHAMINE 30 MG: 30 INJECTION, SOLUTION INTRAMUSCULAR; INTRAVENOUS at 10:17

## 2024-06-18 RX ADMIN — FENTANYL CITRATE 50 MCG: 50 INJECTION, SOLUTION INTRAMUSCULAR; INTRAVENOUS at 10:53

## 2024-06-18 RX ADMIN — DOXYCYCLINE HYCLATE 200 MG: 100 TABLET, COATED ORAL at 10:18

## 2024-06-18 RX ADMIN — LIDOCAINE HYDROCHLORIDE 40 MG: 20 INJECTION, SOLUTION EPIDURAL; INFILTRATION; INTRACAUDAL; PERINEURAL at 10:17

## 2024-06-18 RX ADMIN — FENTANYL CITRATE 50 MCG: 50 INJECTION, SOLUTION INTRAMUSCULAR; INTRAVENOUS at 10:12

## 2024-06-18 RX ADMIN — MIDAZOLAM HYDROCHLORIDE 2 MG: 1 INJECTION, SOLUTION INTRAMUSCULAR; INTRAVENOUS at 10:12

## 2024-06-18 ASSESSMENT — PAIN - FUNCTIONAL ASSESSMENT: PAIN_FUNCTIONAL_ASSESSMENT: 0-10

## 2024-06-18 ASSESSMENT — PAIN DESCRIPTION - DESCRIPTORS: DESCRIPTORS: CRAMPING

## 2024-06-18 ASSESSMENT — LIFESTYLE VARIABLES: SMOKING_STATUS: 1

## 2024-06-18 ASSESSMENT — PAIN SCALES - GENERAL
PAINLEVEL_OUTOF10: 0
PAINLEVEL_OUTOF10: 1
PAINLEVEL_OUTOF10: 8
PAINLEVEL_OUTOF10: 1
PAINLEVEL_OUTOF10: 2
PAINLEVEL_OUTOF10: 8

## 2024-06-18 ASSESSMENT — PAIN DESCRIPTION - LOCATION: LOCATION: ABDOMEN

## 2024-06-18 ASSESSMENT — PAIN DESCRIPTION - ORIENTATION: ORIENTATION: RIGHT;ANTERIOR;LOWER

## 2024-06-18 NOTE — ANESTHESIA POSTPROCEDURE EVALUATION
Department of Anesthesiology  Postprocedure Note    Patient: Pat Miner  MRN: [id number]  YOB: 1985  Date of evaluation: 2024    Procedure Summary       Date: 24 Room / Location: [facility] ASU  / [facility] AMBULATORY OR    Anesthesia Start: 1012 Anesthesia Stop: 105    Procedure: DILATATION AND CURETTAGE WITH SUCTION (Vagina ) Diagnosis:       Missed       (Missed  [O02.1])    Surgeons: Юлия Noguera MD Responsible Provider: Jony Hussein MD    Anesthesia Type: MAC ASA Status: 2            Anesthesia Type: MAC    Sydnie Phase I: Sydnie Score: 9    Sydnie Phase II:      Anesthesia Post Evaluation    Patient location during evaluation: PACU  Patient participation: complete - patient participated  Level of consciousness: awake and alert  Airway patency: patent  Nausea & Vomiting: no nausea  Cardiovascular status: hemodynamically stable  Respiratory status: acceptable  Hydration status: euvolemic  Multimodal analgesia pain management approach  Pain management: adequate    No notable events documented.

## 2024-06-18 NOTE — H&P
Pre-operative Evaluation / History & Physical    Sent From: Sent To:   [facility]  [address] [place] [address]  Phone: [phone] Fax: [phone]      Patient Information  Patient Name Pat Fountain Sex F    1985 Age 37yo   Address [address]  [place] [address] Phone H: [phone]  M: [phone]   Primary Insurance [employer] [facility] [place] - [place])  ID: [id number]  Policy [place]: PAT FOUNTAIN   Secondary Insurance None recorded.     Pre-Op Visit and Medical History  Chief Complaint follow up - gyn ultrasound   History of Present Illness Will 9 - has seen Poornima     39 y/o patient presents today for a GYN ultrasound follow-up.     Pt reports occasional cramping, denies any bleeding.     39 yo pt presents for TVUS follow up     Seen      LMP 04/15/24  saw PCP 24, pos preg test  moderate vaginal bleeding and lower abdominal discomfort around entire lower abdomen into her back that began 24, bleeding now resolved. still some left sided cramping  unplanned pregnancy  not sure if desired, just \"wants to know whats going on\"     hcg  4920   7249  US  - intrauterine fluid collection/possible GS measuring 7.9mm (c/w 5w3d)  in left adnexa 39o32tg mass WITHOUT doppler flow  US  Intrauterine fluid collection/possible gestational sac measuring 12.8mm which has increased in size from previous ultrasound on .  There is no yolk sac or fetal pole visualized.  The adnexa are normal appearing without visualization of previously noted mass adjacent to left ovary.  rh neg - received rhogam  - GS 15mm without interval development of yolk sac or fetal pole     no bleeding since last visit      Past Medical History Past Medical History not reviewed (last reviewed 2024)  Anxiety Disorder: Y  Asthma: Y   Surgical History Surgical History not reviewed (last reviewed

## 2024-06-18 NOTE — PERIOP NOTE
Pat Miner  1985  [id number]    Situation:  Verbal report given from: RN AND CRNA  Procedure: Procedure(s):  DILATATION AND CURETTAGE WITH SUCTION    Background:    Preoperative diagnosis: Missed  [O02.1]    Postoperative diagnosis: * No post-op diagnosis entered *    :  Dr. Noguera    Assistant(s): Circulator: Joie Fuller RN  Scrub Person First: Maximilian Fair    Specimens:   ID Type Source Tests Collected by Time Destination   1 : products of conception Tissue Products of Conception SURGICAL PATHOLOGY Юлия Noguera MD 2024 1034        Assessment:  Intra-procedure medications         Anesthesia gave intra-procedure sedation and medications, see anesthesia flow sheet     Intravenous fluids: LR@ KVO     Vital signs stable. Pt confused as to where she is at and complaining of cramps-repositioned and heater applied and CRNA gave 50mcq Fentanyl. Pt is B neg but received Rhogam previously on 24      Recommendation:    Permission to share finding with father Eduar :  yes

## 2024-06-18 NOTE — ANESTHESIA PRE PROCEDURE
Department of Anesthesiology  Preprocedure Note       Name:  Pat Miner   Age:  38 y.o.  :  1985                                          MRN:  [id number]         Date:  2024      Surgeon: Surgeon(s):  Юлия Noguera MD    Procedure: Procedure(s):  DILATATION AND CURETTAGE WITH SUCTION    Medications prior to admission:   Prior to Admission medications    Medication Sig Start Date End Date Taking? Authorizing Provider   XANAX 1 MG tablet Take 1 tablet by mouth 2 times daily as needed for Anxiety.    Provider, MD Flavio   albuterol sulfate HFA (PROVENTIL;VENTOLIN;PROAIR) 108 (90 Base) MCG/ACT inhaler Inhale 1-2 puffs into the lungs every 4 hours as needed for Wheezing or Shortness of Breath (cough) 24   Cy, Denice I, APRN - NP   Benzoyl Peroxide ER (ADVANCED ACNE WASH) 4.4 % LQCR Use to cleanse face, one to two times daily for acne 24   Cy, Denice I, APRN - NP   medroxyPROGESTERone (DEPO-PROVERA) 150 MG/ML injection INJECT 1 ML BY INTRAMUSCULAR ROUTE EVERY THREE (3) MONTHS. 24   Cy, Denice I, APRN - NP   lidocaine (LIDODERM) 5 % Place 1 patch onto the skin daily 12 hours on, 12 hours off  Patient not taking: Reported on 2024  Cy, Denice I, APRN - NP   vitamin D (ERGOCALCIFEROL) 1.25 MG (23930 UT) CAPS capsule TAKE 1 CAPSULE BY MOUTH EVERY 7 DAYS 4/3/24   Cy, Denice I, APRN - NP   cetirizine (ZYRTEC) 10 MG tablet Take 1 tablet by mouth every morning  Patient not taking: Reported on 2024 3/5/24   Thiongerlinda Joie, APRN - CNP   ibuprofen (ADVIL;MOTRIN) 800 MG tablet Take 1 tablet by mouth every 8 hours as needed for Pain  Patient not taking: Reported on 2024 3/1/24   Cy, Denice I, APRN - NP   diclofenac sodium (VOLTAREN) 1 % GEL Apply 4 g topically 4 times daily 3/1/24   Cy, Denice I, APRN - NP   acetaminophen (TYLENOL 8 HOUR) 650 MG extended release tablet Take 1 tablet by mouth every 8 hours as needed for Pain 24

## 2024-06-18 NOTE — OP NOTE
SUCTION CURETTAGE FULL OP NOTE    Pat Miner  [id number]  1985      DATE OF PROCEDURE:  24    PREOPERATIVE DIAGNOSIS:  Missed  [O02.1], anembryonic gestation    POSTOPERATIVE DIAGNOSIS:  Same    PROCEDURE: Procedure(s):  DILATATION AND CURETTAGE WITH SUCTION     SURGEON:  Юлия Noguera MD    ANESTHESIA:monitored anesthesia care    EBL: Minimal    SPECIMENS: Products of conception    FINDINGS: Normal cervix, uterus expected size. Good cri post procedure. Thin stripe on ultrasound post-procedure.    PROCEDURE: Patient was placed on the operating table in the supine position and after induction of anesthesia she was placed in the dorsal lithotomy position and prepped and draped in the usual fashion for vaginal surgery. Cervix was exposed with a Graves vaginal speculum and grasped with a single-tooth tenaculum. Approximately 10cc of 1% lidocaine was then injected to achieve a paracervical block. The cervix was then dilated to 7mm.  A curved 7 suction curette device was then introduced into the endometrial cavity. Thorough suction curettage followed by sharp curettage with a large curette followed again by suction curettage was performed until the suction returned no further clot or products of conception. Adequate curettage was felt to be obtained. Abdominal ultrasound revealed thin stripe. Hemostasis appeared normal, Instruments were removed. The patient went to the recovery room in satisfactory condition.  All counts were correct times two.  Of note blood type was RH negative

## 2024-06-18 NOTE — PERIOP NOTE
Pt. Alert and states cramps tolerable. Denies chill. Discharge instructions reviewed with caregiver and patient. Allowed and answered questions. Tolerating PO fluids. Both state ready for discharge. 1151 Escorted to BR-unable to void and only spotting on peripad. Discharged to car without incident

## 2024-06-18 NOTE — DISCHARGE INSTRUCTIONS
Please call for an appointment if this has not already been arranged. Our office phone number is [phone]. If appropriate, the microscopic results from your procedure will be discussed at this follow-up visit.                TAKE NARCOTIC PAIN MEDICATIONS WITH FOOD     Narcotics tend to be constipating, we suggest taking a stool softener such as Colace or Miralax (follow package instructions).    DO NOT DRIVE WHILE TAKING NARCOTIC PAIN MEDICATIONS.    DO NOT TAKE SLEEPING MEDICATIONS OR ANTIANXIETY MEDICATIONS WHILE TAKING NARCOTIC PAIN MEDICATIONS,  ESPECIALLY THE NIGHT OF ANESTHESIA!    CPAP PATIENTS BE SURE TO WEAR MACHINE WHENEVER NAPPING OR SLEEPING!    DISCHARGE SUMMARY from Nurse    The following personal items collected during your admission are returned to you:   Dental Appliance:    Vision:    Hearing Aid:    Jewelry:    Clothing:    Other Valuables:    Valuables sent to safe: Dose (mL/hr) Propofol : 0 mL/hr      PATIENT INSTRUCTIONS:    After General Anesthesia or Intravenous Sedation, for 24 hours or while taking prescription Narcotics:        Someone should be with you for the next 24 hours.        For your own safety, a responsible adult must drive you home.  Limit your activities  Recommended activity: Rest today, up with assistance today. Do not climb stairs or shower unattended for the next 24 hours.  Please start with a soft bland diet and advance as tolerated (no nausea) to regular diet.  If you have a sore throat you should try the following: fluids, warm salt water gargles, or throat lozenges. If it does not improve after several days please follow up with your primary physician.  Do not drive and operate hazardous machinery  Do not make important personal or business decisions  Do  not drink alcoholic beverages  If you have not urinated within 8 hours after discharge, please contact your surgeon on call.    Report the following to your surgeon:  Excessive pain, swelling, redness or odor

## 2024-08-13 ENCOUNTER — APPOINTMENT (OUTPATIENT)
Facility: HOSPITAL | Age: 39
End: 2024-08-13
Payer: MEDICAID

## 2024-08-13 ENCOUNTER — HOSPITAL ENCOUNTER (EMERGENCY)
Facility: HOSPITAL | Age: 39
Discharge: HOME | End: 2024-08-13
Payer: MEDICAID

## 2024-08-13 VITALS
TEMPERATURE: 97.7 F | OXYGEN SATURATION: 97 % | HEIGHT: 65 IN | WEIGHT: 181 LBS | SYSTOLIC BLOOD PRESSURE: 110 MMHG | DIASTOLIC BLOOD PRESSURE: 81 MMHG | HEART RATE: 95 BPM | BODY MASS INDEX: 30.16 KG/M2 | RESPIRATION RATE: 19 BRPM

## 2024-08-13 DIAGNOSIS — S93.402D SPRAIN OF LEFT ANKLE, UNSPECIFIED LIGAMENT, SUBSEQUENT ENCOUNTER: Primary | ICD-10-CM

## 2024-08-13 PROCEDURE — 99283 EMERGENCY DEPT VISIT LOW MDM: CPT

## 2024-08-13 PROCEDURE — 73610 X-RAY EXAM OF ANKLE: CPT

## 2024-08-13 RX ORDER — LIDOCAINE 50 MG/G
1 PATCH TOPICAL DAILY
Qty: 10 PATCH | Refills: 0 | Status: SHIPPED | OUTPATIENT
Start: 2024-08-13 | End: 2024-08-23

## 2024-08-13 RX ORDER — ACETAMINOPHEN 500 MG
1000 TABLET ORAL EVERY 6 HOURS PRN
Qty: 20 TABLET | Refills: 0 | Status: SHIPPED | OUTPATIENT
Start: 2024-08-13

## 2024-08-13 ASSESSMENT — ENCOUNTER SYMPTOMS
BACK PAIN: 0
CHEST TIGHTNESS: 0
ABDOMINAL PAIN: 0
EYE PAIN: 0
WHEEZING: 0
DIARRHEA: 0
RHINORRHEA: 0
SORE THROAT: 0
COUGH: 0
NAUSEA: 0
VOMITING: 0

## 2024-08-13 ASSESSMENT — PAIN - FUNCTIONAL ASSESSMENT: PAIN_FUNCTIONAL_ASSESSMENT: 0-10

## 2024-08-13 ASSESSMENT — PAIN DESCRIPTION - ORIENTATION: ORIENTATION: LEFT

## 2024-08-13 ASSESSMENT — PAIN DESCRIPTION - DESCRIPTORS: DESCRIPTORS: THROBBING

## 2024-08-13 ASSESSMENT — PAIN SCALES - GENERAL: PAINLEVEL_OUTOF10: 7

## 2024-08-13 ASSESSMENT — PAIN DESCRIPTION - LOCATION: LOCATION: ANKLE

## 2024-08-13 ASSESSMENT — PAIN DESCRIPTION - PAIN TYPE: TYPE: ACUTE PAIN

## 2024-08-13 NOTE — ED NOTES
Pt presents to ED ambulatory complaining of left ankle sprain at work that occurred 08/06/2024. Pt states she went to [facility] and they performed an X-ray which showed she had a sprain ankle. Pt was prescribed Ibuprofen, no relief.  Pt is alert and oriented x 4, RR even and unlabored, skin is warm and dry. Assessment completed and pt updated on plan of care.  Call bell in reach.        Emergency Department Nursing Plan of Care       The Nursing Plan of Care is developed from the Nursing assessment and Emergency Department Attending provider initial evaluation.  The plan of care may be reviewed in the “ED Provider note”.    The Plan of Care was developed with the following considerations:   Patient / Family readiness to learn indicated by:verbalized understanding  Persons(s) to be included in education: patient  Barriers to Learning/Limitations:None    Signed

## 2024-08-14 NOTE — ED PROVIDER NOTES
Please disregards these errors. Please excuse any errors that have escaped final proofreading.)         Kala Lama PA-C  08/13/24 2012

## 2024-08-14 NOTE — ED NOTES
Discharge instructions were given to the patient by JOSUE ENNIS RN.     The patient left the Emergency Department alert and oriented and in no acute distress with 2 prescriptions. The patient was encouraged to call or return to the ED for worsening issues or problems and was encouraged to schedule a follow up appointment for continuing care.     Ambulation assessment completed before discharge.  Pt left Emergency Department ambulating at baseline with no ortho devices  Ortho device education: none    The patient verbalized understanding of discharge instructions and prescriptions, all questions were answered. The patient has no further concerns at this time.

## 2024-09-19 ENCOUNTER — HOSPITAL ENCOUNTER (EMERGENCY)
Facility: HOSPITAL | Age: 39
Discharge: HOME | End: 2024-09-19
Attending: EMERGENCY MEDICINE
Payer: MEDICAID

## 2024-09-19 VITALS
WEIGHT: 180 LBS | OXYGEN SATURATION: 100 % | BODY MASS INDEX: 29.99 KG/M2 | TEMPERATURE: 98 F | DIASTOLIC BLOOD PRESSURE: 89 MMHG | RESPIRATION RATE: 21 BRPM | HEART RATE: 86 BPM | HEIGHT: 65 IN | SYSTOLIC BLOOD PRESSURE: 131 MMHG

## 2024-09-19 DIAGNOSIS — G89.18 OTHER ACUTE POSTPROCEDURAL PAIN: ICD-10-CM

## 2024-09-19 DIAGNOSIS — S93.402D SPRAIN OF LEFT ANKLE, UNSPECIFIED LIGAMENT, SUBSEQUENT ENCOUNTER: Primary | ICD-10-CM

## 2024-09-19 PROCEDURE — 99284 EMERGENCY DEPT VISIT MOD MDM: CPT

## 2024-09-19 PROCEDURE — 96372 THER/PROPH/DIAG INJ SC/IM: CPT

## 2024-09-19 PROCEDURE — 6360000002 HC RX W HCPCS: Performed by: EMERGENCY MEDICINE

## 2024-09-19 PROCEDURE — 6370000000 HC RX 637 (ALT 250 FOR IP): Performed by: EMERGENCY MEDICINE

## 2024-09-19 RX ORDER — KETOROLAC TROMETHAMINE 30 MG/ML
15 INJECTION, SOLUTION INTRAMUSCULAR; INTRAVENOUS
Status: COMPLETED | OUTPATIENT
Start: 2024-09-19 | End: 2024-09-19

## 2024-09-19 RX ORDER — OXYCODONE HYDROCHLORIDE 5 MG/1
5 TABLET ORAL
Status: COMPLETED | OUTPATIENT
Start: 2024-09-19 | End: 2024-09-19

## 2024-09-19 RX ORDER — LIDOCAINE 4 G/G
1 PATCH TOPICAL
Status: DISCONTINUED | OUTPATIENT
Start: 2024-09-19 | End: 2024-09-19 | Stop reason: HOSPADM

## 2024-09-19 RX ADMIN — OXYCODONE HYDROCHLORIDE 5 MG: 5 TABLET ORAL at 05:45

## 2024-09-19 RX ADMIN — KETOROLAC TROMETHAMINE 15 MG: 30 INJECTION, SOLUTION INTRAMUSCULAR at 05:45

## 2024-09-19 ASSESSMENT — PAIN - FUNCTIONAL ASSESSMENT: PAIN_FUNCTIONAL_ASSESSMENT: 0-10

## 2024-09-19 ASSESSMENT — PAIN DESCRIPTION - DESCRIPTORS: DESCRIPTORS: DISCOMFORT;THROBBING

## 2024-09-19 ASSESSMENT — PAIN DESCRIPTION - LOCATION: LOCATION: ANKLE

## 2024-09-19 ASSESSMENT — PAIN SCALES - GENERAL: PAINLEVEL_OUTOF10: 8

## 2024-09-19 ASSESSMENT — PAIN DESCRIPTION - ORIENTATION: ORIENTATION: LEFT

## 2024-10-26 ENCOUNTER — HOSPITAL ENCOUNTER (EMERGENCY)
Facility: HOSPITAL | Age: 39
Discharge: HOME | End: 2024-10-26
Attending: EMERGENCY MEDICINE
Payer: MEDICAID

## 2024-10-26 VITALS
RESPIRATION RATE: 14 BRPM | HEART RATE: 62 BPM | OXYGEN SATURATION: 100 % | SYSTOLIC BLOOD PRESSURE: 119 MMHG | DIASTOLIC BLOOD PRESSURE: 92 MMHG | HEIGHT: 65 IN | TEMPERATURE: 97.2 F | BODY MASS INDEX: 29.99 KG/M2 | WEIGHT: 180 LBS

## 2024-10-26 DIAGNOSIS — K22.6 MALLORY-WEISS SYNDROME: ICD-10-CM

## 2024-10-26 DIAGNOSIS — K52.9 GASTROENTERITIS: ICD-10-CM

## 2024-10-26 DIAGNOSIS — E86.0 DEHYDRATION: Primary | ICD-10-CM

## 2024-10-26 LAB
ALBUMIN SERPL-MCNC: 3.7 G/DL (ref 3.5–5)
ALBUMIN/GLOB SERPL: 0.9 (ref 1.1–2.2)
ALP SERPL-CCNC: 72 U/L (ref 45–117)
ALT SERPL-CCNC: 9 U/L (ref 12–78)
ANION GAP SERPL CALC-SCNC: 12 MMOL/L (ref 2–12)
AST SERPL-CCNC: 15 U/L (ref 15–37)
BASOPHILS # BLD: 0 K/UL (ref 0–0.1)
BASOPHILS NFR BLD: 0 % (ref 0–1)
BILIRUB SERPL-MCNC: 0.4 MG/DL (ref 0.2–1)
BUN SERPL-MCNC: 7 MG/DL (ref 6–20)
BUN/CREAT SERPL: 7 (ref 12–20)
CALCIUM SERPL-MCNC: 8.7 MG/DL (ref 8.5–10.1)
CHLORIDE SERPL-SCNC: 103 MMOL/L (ref 97–108)
CO2 SERPL-SCNC: 27 MMOL/L (ref 21–32)
CREAT SERPL-MCNC: 0.95 MG/DL (ref 0.55–1.02)
DIFFERENTIAL METHOD BLD: ABNORMAL
EOSINOPHIL # BLD: 0 K/UL (ref 0–0.4)
EOSINOPHIL NFR BLD: 0 % (ref 0–7)
ERYTHROCYTE [DISTWIDTH] IN BLOOD BY AUTOMATED COUNT: 13.1 % (ref 11.5–14.5)
GLOBULIN SER CALC-MCNC: 3.9 G/DL (ref 2–4)
GLUCOSE SERPL-MCNC: 108 MG/DL (ref 65–100)
HCT VFR BLD AUTO: 41.6 % (ref 35–47)
HGB BLD-MCNC: 13.6 G/DL (ref 11.5–16)
IMM GRANULOCYTES # BLD AUTO: 0.1 K/UL (ref 0–0.04)
IMM GRANULOCYTES NFR BLD AUTO: 1 % (ref 0–0.5)
LIPASE SERPL-CCNC: 15 U/L (ref 13–75)
LYMPHOCYTES # BLD: 0.9 K/UL (ref 0.8–3.5)
LYMPHOCYTES NFR BLD: 8 % (ref 12–49)
MCH RBC QN AUTO: 29.4 PG (ref 26–34)
MCHC RBC AUTO-ENTMCNC: 32.7 G/DL (ref 30–36.5)
MCV RBC AUTO: 89.8 FL (ref 80–99)
MONOCYTES # BLD: 0.4 K/UL (ref 0–1)
MONOCYTES NFR BLD: 3 % (ref 5–13)
NEUTS SEG # BLD: 9.9 K/UL (ref 1.8–8)
NEUTS SEG NFR BLD: 88 % (ref 32–75)
NRBC # BLD: 0 K/UL (ref 0–0.01)
NRBC BLD-RTO: 0 PER 100 WBC
PLATELET # BLD AUTO: 293 K/UL (ref 150–400)
PMV BLD AUTO: 9.5 FL (ref 8.9–12.9)
POTASSIUM SERPL-SCNC: 3.8 MMOL/L (ref 3.5–5.1)
PROT SERPL-MCNC: 7.6 G/DL (ref 6.4–8.2)
RBC # BLD AUTO: 4.63 M/UL (ref 3.8–5.2)
SODIUM SERPL-SCNC: 142 MMOL/L (ref 136–145)
WBC # BLD AUTO: 11.2 K/UL (ref 3.6–11)

## 2024-10-26 PROCEDURE — 83690 ASSAY OF LIPASE: CPT

## 2024-10-26 PROCEDURE — 85025 COMPLETE CBC W/AUTO DIFF WBC: CPT

## 2024-10-26 PROCEDURE — 96374 THER/PROPH/DIAG INJ IV PUSH: CPT

## 2024-10-26 PROCEDURE — 80053 COMPREHEN METABOLIC PANEL: CPT

## 2024-10-26 PROCEDURE — 6360000002 HC RX W HCPCS: Performed by: EMERGENCY MEDICINE

## 2024-10-26 PROCEDURE — 36415 COLL VENOUS BLD VENIPUNCTURE: CPT

## 2024-10-26 PROCEDURE — 96361 HYDRATE IV INFUSION ADD-ON: CPT

## 2024-10-26 PROCEDURE — 99284 EMERGENCY DEPT VISIT MOD MDM: CPT

## 2024-10-26 PROCEDURE — 2580000003 HC RX 258: Performed by: EMERGENCY MEDICINE

## 2024-10-26 RX ORDER — 0.9 % SODIUM CHLORIDE 0.9 %
500 INTRAVENOUS SOLUTION INTRAVENOUS ONCE
Status: DISCONTINUED | OUTPATIENT
Start: 2024-10-26 | End: 2024-10-26

## 2024-10-26 RX ORDER — ONDANSETRON 2 MG/ML
4 INJECTION INTRAMUSCULAR; INTRAVENOUS EVERY 6 HOURS PRN
Status: DISCONTINUED | OUTPATIENT
Start: 2024-10-26 | End: 2024-10-26 | Stop reason: HOSPADM

## 2024-10-26 RX ORDER — ONDANSETRON 4 MG/1
4 TABLET, ORALLY DISINTEGRATING ORAL EVERY 8 HOURS PRN
Qty: 20 TABLET | Refills: 0 | Status: SHIPPED | OUTPATIENT
Start: 2024-10-26 | End: 2024-11-02

## 2024-10-26 RX ORDER — 0.9 % SODIUM CHLORIDE 0.9 %
1000 INTRAVENOUS SOLUTION INTRAVENOUS ONCE
Status: DISCONTINUED | OUTPATIENT
Start: 2024-10-26 | End: 2024-10-26

## 2024-10-26 RX ORDER — PANTOPRAZOLE SODIUM 40 MG/1
40 TABLET, DELAYED RELEASE ORAL
Qty: 30 TABLET | Refills: 0 | Status: SHIPPED | OUTPATIENT
Start: 2024-10-26

## 2024-10-26 RX ADMIN — SODIUM CHLORIDE 500 ML: 9 INJECTION, SOLUTION INTRAVENOUS at 18:09

## 2024-10-26 RX ADMIN — ONDANSETRON 4 MG: 2 INJECTION INTRAMUSCULAR; INTRAVENOUS at 18:15

## 2024-10-26 ASSESSMENT — LIFESTYLE VARIABLES
HOW MANY STANDARD DRINKS CONTAINING ALCOHOL DO YOU HAVE ON A TYPICAL DAY: 1 OR 2
HOW OFTEN DO YOU HAVE A DRINK CONTAINING ALCOHOL: MONTHLY OR LESS

## 2024-10-26 ASSESSMENT — PAIN SCALES - GENERAL
PAINLEVEL_OUTOF10: 10
PAINLEVEL_OUTOF10: 0

## 2024-10-26 ASSESSMENT — PAIN DESCRIPTION - DESCRIPTORS: DESCRIPTORS: DISCOMFORT

## 2024-10-26 ASSESSMENT — PAIN DESCRIPTION - LOCATION: LOCATION: HEAD;ABDOMEN

## 2024-10-26 ASSESSMENT — PAIN - FUNCTIONAL ASSESSMENT
PAIN_FUNCTIONAL_ASSESSMENT: 0-10
PAIN_FUNCTIONAL_ASSESSMENT: 0-10

## 2024-10-26 NOTE — ED PROVIDER NOTES
[facility] EMERGENCY DEPT  EMERGENCY DEPARTMENT ENCOUNTER       Pt Name: Pat Miner  MRN: [id number]  Birthdate 1985  Date of evaluation: 10/26/2024  Provider: Juancarlos Wang MD   PCP: Denice Benoit APRN - NP  Note Started: 5:40 PM 10/26/24     CHIEF COMPLAINT       Chief Complaint   Patient presents with    Nausea    Diarrhea      HISTORY OF PRESENT ILLNESS: 1 or more elements      History From: Patient, History limited by: None     Pat Miner is a 39 y.o. female with a history of bipolar 1, IBS who presents with nausea vomiting diarrhea.  Patient reports since last night she has had persistent vomiting, intolerance of any solid or liquid p.o. and few episodes of loose watery stools.  No known sick contact, no obvious suspect food however has eaten out recently.  Denies abdominal pain.  Does report some scant blood in her vomitus.     Nursing Notes were all reviewed and agreed with or any disagreements were addressed in the HPI.     REVIEW OF SYSTEMS        Positives and Pertinent negatives as per HPI.    PAST HISTORY     Past Medical History:  Past Medical History:   Diagnosis Date    Anxiety     Asthma     Back pain     Bipolar 1 disorder (HCC)     IBS (irritable bowel syndrome)     Other ill-defined conditions(799.89)     Frequent Boils    Scoliosis        Past Surgical History:  Past Surgical History:   Procedure Laterality Date    DILATION AND CURETTAGE OF UTERUS N/A 2024    DILATATION AND CURETTAGE WITH SUCTION performed by Юлия Noguera MD at [facility] AMBULATORY OR    TONSILLECTOMY      WISDOM TOOTH EXTRACTION         Family History:  Family History   Problem Relation Age of Onset    Cancer Neg Hx     Hypertension Neg Hx     Stroke Neg Hx     Diabetes Neg Hx        Social History:  Social History     Tobacco Use    Smoking status: Every Day     Current packs/day: 0.00     Types: Cigarettes     Last attempt to quit: 2013     Years since quittin.2    Smokeless tobacco: Never

## 2024-10-26 NOTE — ED NOTES
Bedside and Verbal shift change report given to VANNA Montero (oncoming nurse) by VANNA Julain (offgoing nurse). Report included the following information Nurse Handoff Report, ED Encounter Summary, ED SBAR, Intake/Output, MAR, and Recent Results.

## 2024-10-26 NOTE — ED NOTES
Pt discharged home. Home care and follow-up instructions given to pt. Pt verbalized understanding. IV removed. No distress observed. 2 prescriptions sent to pt's Pharmacy. Pt ambulated to exit.

## 2024-10-26 NOTE — ED NOTES
Discharge instructions were given to the patient by gume.     The patient left the Emergency Department ambulatory, alert and oriented and in no acute distress with 2 prescriptions. The patient was encouraged to call or return to the ED for worsening issues or problems and was encouraged to schedule a follow up appointment for continuing care.     The patient verbalized understanding of discharge instructions and prescriptions, all questions were answered. The patient has no further concerns at this time.

## 2024-10-26 NOTE — ED NOTES
Pt presents ambulatory to ED complaining of N/V and headache after drinking last night. Pt reports emesis is undigested food.. Pt is alert and oriented x 4, RR even and unlabored, skin is warm and dry. Assesment completed and pt updated on plan of care.       Emergency Department Nursing Plan of Care       The Nursing Plan of Care is developed from the Nursing assessment and Emergency Department Attending provider initial evaluation.  The plan of care may be reviewed in the “ED Provider note”.    The Plan of Care was developed with the following considerations:   Patient / Family readiness to learn indicated by:verbalized understanding  Persons(s) to be included in education: patient  Barriers to Learning/Limitations:None    Signed     Beth Bhat RN    10/26/2024   6:11 PM

## 2024-10-26 NOTE — ED TRIAGE NOTES
Pt reports going out last night and having several drinks but states she early this AM having N/V/D, abd pain and headaches. Pt reports taking BC hangover with no relief.

## 2024-11-16 DIAGNOSIS — J45.20 MILD INTERMITTENT REACTIVE AIRWAY DISEASE WITHOUT COMPLICATION: ICD-10-CM

## 2024-11-18 RX ORDER — ALBUTEROL SULFATE 90 UG/1
1-2 INHALANT RESPIRATORY (INHALATION) EVERY 4 HOURS PRN
Qty: 18 EACH | Refills: 1 | Status: SHIPPED | OUTPATIENT
Start: 2024-11-18

## 2025-01-11 ENCOUNTER — HOSPITAL ENCOUNTER (EMERGENCY)
Facility: HOSPITAL | Age: 40
Discharge: HOME | End: 2025-01-11
Attending: STUDENT IN AN ORGANIZED HEALTH CARE EDUCATION/TRAINING PROGRAM
Payer: MEDICAID

## 2025-01-11 VITALS
WEIGHT: 188.49 LBS | OXYGEN SATURATION: 99 % | RESPIRATION RATE: 18 BRPM | TEMPERATURE: 97.8 F | HEIGHT: 65 IN | HEART RATE: 69 BPM | SYSTOLIC BLOOD PRESSURE: 107 MMHG | DIASTOLIC BLOOD PRESSURE: 70 MMHG | BODY MASS INDEX: 31.4 KG/M2

## 2025-01-11 DIAGNOSIS — N76.0 BACTERIAL VAGINOSIS: Primary | ICD-10-CM

## 2025-01-11 DIAGNOSIS — B96.89 BACTERIAL VAGINOSIS: Primary | ICD-10-CM

## 2025-01-11 LAB
APPEARANCE UR: CLEAR
BACTERIA URNS QL MICRO: NEGATIVE /HPF
BILIRUB UR QL: NEGATIVE
CLUE CELLS VAG QL WET PREP: ABNORMAL
COLOR UR: ABNORMAL
EPITH CASTS URNS QL MICRO: ABNORMAL /LPF
GLUCOSE UR STRIP.AUTO-MCNC: NEGATIVE MG/DL
HCG UR QL: NEGATIVE
HGB UR QL STRIP: NEGATIVE
KETONES UR QL STRIP.AUTO: NEGATIVE MG/DL
LEUKOCYTE ESTERASE UR QL STRIP.AUTO: NEGATIVE
NITRITE UR QL STRIP.AUTO: NEGATIVE
PH UR STRIP: 7 (ref 5–8)
PROT UR STRIP-MCNC: NEGATIVE MG/DL
RBC #/AREA URNS HPF: ABNORMAL /HPF (ref 0–5)
SP GR UR REFRACTOMETRY: >1.03 (ref 1–1.03)
T VAGINALIS VAG QL WET PREP: ABNORMAL
URINE CULTURE IF INDICATED: ABNORMAL
UROBILINOGEN UR QL STRIP.AUTO: 1 EU/DL (ref 0.2–1)
WBC URNS QL MICRO: ABNORMAL /HPF (ref 0–4)
YEAST: ABNORMAL

## 2025-01-11 PROCEDURE — 87491 CHLMYD TRACH DNA AMP PROBE: CPT

## 2025-01-11 PROCEDURE — 81001 URINALYSIS AUTO W/SCOPE: CPT

## 2025-01-11 PROCEDURE — 87591 N.GONORRHOEAE DNA AMP PROB: CPT

## 2025-01-11 PROCEDURE — 99283 EMERGENCY DEPT VISIT LOW MDM: CPT

## 2025-01-11 PROCEDURE — 81025 URINE PREGNANCY TEST: CPT

## 2025-01-11 PROCEDURE — 87210 SMEAR WET MOUNT SALINE/INK: CPT

## 2025-01-11 RX ORDER — FLUCONAZOLE 150 MG/1
150 TABLET ORAL ONCE
Qty: 2 TABLET | Refills: 0 | Status: SHIPPED | OUTPATIENT
Start: 2025-01-11 | End: 2025-01-11

## 2025-01-11 RX ORDER — METRONIDAZOLE 500 MG/1
500 TABLET ORAL 2 TIMES DAILY
Qty: 14 TABLET | Refills: 0 | Status: SHIPPED | OUTPATIENT
Start: 2025-01-11 | End: 2025-01-18

## 2025-01-11 ASSESSMENT — LIFESTYLE VARIABLES
HOW OFTEN DO YOU HAVE A DRINK CONTAINING ALCOHOL: 2-4 TIMES A MONTH
HOW MANY STANDARD DRINKS CONTAINING ALCOHOL DO YOU HAVE ON A TYPICAL DAY: 1 OR 2

## 2025-01-11 ASSESSMENT — PAIN DESCRIPTION - PAIN TYPE: TYPE: ACUTE PAIN

## 2025-01-11 ASSESSMENT — PAIN - FUNCTIONAL ASSESSMENT: PAIN_FUNCTIONAL_ASSESSMENT: 0-10

## 2025-01-11 ASSESSMENT — PAIN DESCRIPTION - LOCATION: LOCATION: ABDOMEN;FLANK

## 2025-01-11 ASSESSMENT — PAIN SCALES - GENERAL: PAINLEVEL_OUTOF10: 8

## 2025-01-11 ASSESSMENT — PAIN DESCRIPTION - ORIENTATION: ORIENTATION: LOWER

## 2025-01-11 ASSESSMENT — PAIN DESCRIPTION - DESCRIPTORS: DESCRIPTORS: SHARP

## 2025-01-12 NOTE — ED NOTES
Discharge instructions were given to the patient by Maximilian GOULD     The patient left the Emergency Department alert and oriented and in no acute distress with 2 prescriptions. The patient was encouraged to call or return to the ED for worsening issues or problems and was encouraged to schedule a follow up appointment for continuing care.     Ambulation assessment completed before discharge.  Pt left Emergency Department ambulating at baseline with no ortho devices  Ortho device education: none    The patient verbalized understanding of discharge instructions and prescriptions, all questions were answered. The patient has no further concerns at this time.

## 2025-01-12 NOTE — ED TRIAGE NOTES
Pt came in the ED with complaints of abdominal pain, back pain and vaginal discharge started yesterday. Pt denies nausea and vomiting.    Subjective   Maria Del Carmen Esquivel is a 54 y.o. female. Presents to [facility]    Chief Complaint   Patient presents with   • Abdominal Pain       Abdominal Pain  This is a new problem. Episode onset: 08/03/2022. The onset quality is gradual. The problem occurs intermittently. The problem has been waxing and waning. The pain is located in the RUQ. The pain is at a severity of 3/10. The pain is mild. The quality of the pain is sharp and a sensation of fullness. The abdominal pain does not radiate. Associated symptoms include nausea. Pertinent negatives include no arthralgias, belching, constipation, diarrhea, dysuria, fever, frequency, headaches or vomiting. The pain is aggravated by eating. Relieved by: not eating. She has tried antacids for the symptoms. The treatment provided mild relief.        I personally reviewed and updated the patient's allergies, medications, problem list, and past medical, surgical, social, and family history. I have reviewed and confirmed the accuracy of the History of Present Illness and Review of Symptoms as documented by the MA/LPN/RN. Gloria Shore MD    Allergies:  Allergies   Allergen Reactions   • Beef-Derived Products Anaphylaxis   • Pork-Derived Products Anaphylaxis       Social History:  Social History     Socioeconomic History   • Marital status:    Tobacco Use   • Smoking status: Never Smoker   • Smokeless tobacco: Never Used   Vaping Use   • Vaping Use: Never used   Substance and Sexual Activity   • Alcohol use: No   • Drug use: No   • Sexual activity: Yes     Partners: Male       Family History:  Family History   Problem Relation Age of Onset   • Hyperlipidemia Mother    • Diabetes Mother    • Heart disease Father    • Heart attack Father    • Hyperlipidemia Father    • Diabetes Father    • Heart disease Maternal Uncle        Past Medical History :  Patient Active Problem List   Diagnosis   • Abnormal stress test   • Adult situational stress disorder    • Allergic rhinitis due to pollen   • Arthritis   • Asymptomatic varicose veins of bilateral lower extremities   • Carpal tunnel syndrome   • DDD (degenerative disc disease), cervical   • Degenerative disc disease at L5-S1 level   • Family history of diabetes mellitus   • Fibromyalgia   • Generalized osteoarthritis   • GERD (gastroesophageal reflux disease)   • Herpes zoster without complication   • History of varicella   • Cervical radiculopathy   • Iron deficiency anemia   • Mild intermittent asthma without complication   • Class 3 severe obesity due to excess calories with serious comorbidity and body mass index (BMI) of 40.0 to 44.9 in adult (HCC)   • Plantar fasciitis, right   • Edema   • Dysuria   • Lateral epicondylitis of right elbow   • Anxiety   • Neck pain   • Acute pancreatitis   • Palpitations   • Mixed hyperlipidemia       Medication List:    Current Outpatient Medications:   •  albuterol sulfate  (90 Base) MCG/ACT inhaler, Inhale 2 puffs Every 4 (Four) Hours As Needed for Wheezing., Disp: 6.7 g, Rfl: 5  •  famotidine (PEPCID) 10 MG tablet, Take  by mouth., Disp: , Rfl:   •  fluticasone (FLONASE) 50 MCG/ACT nasal spray, 2 sprays into the nostril(s) as directed by provider Daily., Disp: , Rfl:   •  Loratadine 10 MG capsule, Take  by mouth., Disp: , Rfl:   •  montelukast (SINGULAIR) 10 MG tablet, TAKE 1 TABLET BY MOUTH EVERY DAY AT NIGHT, Disp: 90 tablet, Rfl: 3  •  omeprazole (priLOSEC) 20 MG capsule, Take 1 capsule by mouth Daily., Disp: 30 capsule, Rfl: 5  •  traZODone (DESYREL) 50 MG tablet, Take 50 mg by mouth every night at bedtime., Disp: , Rfl:   •  rosuvastatin (CRESTOR) 5 MG tablet, Take 1 tablet by mouth Daily., Disp: 30 tablet, Rfl: 12    Past Surgical History:  Past Surgical History:   Procedure Laterality Date   • CHOLECYSTECTOMY     • LAPAROSCOPIC TUBAL LIGATION         Review of Systems:  Review of Systems   Constitutional: Negative for activity change and fever.   HENT: Negative  "for ear pain, rhinorrhea, sinus pressure and voice change.    Eyes: Negative for visual disturbance.   Respiratory: Negative for cough and shortness of breath.    Cardiovascular: Negative for chest pain.   Gastrointestinal: Positive for abdominal pain and nausea. Negative for constipation, diarrhea and vomiting.   Endocrine: Negative for cold intolerance and heat intolerance.   Genitourinary: Negative for dysuria, frequency and urgency.   Musculoskeletal: Negative for arthralgias.   Skin: Negative for rash.   Neurological: Negative for syncope.   Hematological: Does not bruise/bleed easily.   Psychiatric/Behavioral: Negative for depressed mood. The patient is not nervous/anxious.        Physical Exam:  Vital Signs:    Vital Signs:   /84 (BP Location: Right arm, Patient Position: Sitting, Cuff Size: Adult)   Pulse 100   Temp 97.3 °F (36.3 °C) (Temporal)   Resp 18   Ht 167.6 cm (66\")   Wt 120 kg (265 lb 9.6 oz)   SpO2 98% Comment: room air  BMI 42.87 kg/m²     Result Review :   The following data was reviewed by: Gloria Shore MD on 08/18/2022:            Latest Reference Range & Units 08/11/22 11:17   Glucose 65 - 99 mg/dL 91   Sodium 134 - 144 mmol/L 142   Potassium 3.5 - 5.2 mmol/L 4.8   CO2 20 - 29 mmol/L 26   Chloride 96 - 106 mmol/L 103   Creatinine 0.57 - 1.00 mg/dL 0.94   BUN 6 - 24 mg/dL 16   BUN/Creatinine Ratio 9 - 23  17   Calcium 8.7 - 10.2 mg/dL 9.4   EGFR Result >59 mL/min/1.73 72   Alkaline Phosphatase 44 - 121 IU/L 85   Total Protein 6.0 - 8.5 g/dL 7.1   ALT (SGPT) 0 - 32 IU/L 16   AST (SGOT) 0 - 40 IU/L 15   Total Bilirubin 0.0 - 1.2 mg/dL 0.2   Albumin 3.8 - 4.9 g/dL 4.3   A/G Ratio 1.2 - 2.2  1.5   Total Cholesterol 100 - 199 mg/dL 248 (H)   HDL Cholesterol >39 mg/dL 40   LDL Cholesterol  0 - 99 mg/dL 179 (H)   Triglycerides 0 - 149 mg/dL 157 (H)   Chol/HDL Ratio 0.0 - 4.4 ratio 6.2 (H)   VLDL Cholesterol Willie 5 - 40 mg/dL 29   Amylase 31 - 110 U/L 37   Lipase 14 - 72 U/L 26 "   Globulin 1.5 - 4.5 g/dL 2.8   WBC 3.4 - 10.8 x10E3/uL 5.4   RBC 3.77 - 5.28 x10E6/uL 4.50   Hemoglobin 11.1 - 15.9 g/dL 12.9   Hematocrit 34.0 - 46.6 % 39.7   RDW 11.7 - 15.4 % 13.1   MCV 79 - 97 fL 88   MCH 26.6 - 33.0 pg 28.7   MCHC 31.5 - 35.7 g/dL 32.5   Platelets 150 - 450 x10E3/uL 321   Neutrophil Rel % Not Estab. % 63   Lymphocyte Rel % Not Estab. % 23   Monocyte Rel % Not Estab. % 8   Eosinophil Rel % Not Estab. % 4   Basophil Rel % Not Estab. % 1   Immature Granulocyte Rel % Not Estab. % 1   Neutrophils Absolute 1.4 - 7.0 x10E3/uL 3.4   Lymphocytes Absolute 0.7 - 3.1 x10E3/uL 1.2   Monocytes Absolute 0.1 - 0.9 x10E3/uL 0.4   Eosinophils Absolute 0.0 - 0.4 x10E3/uL 0.2   Basophils Absolute 0.0 - 0.2 x10E3/uL 0.0   Immature Grans, Absolute 0.0 - 0.1 x10E3/uL 0.0   (H): Data is abnormally high  CT Abdomen With Contrast    Result Date: 8/17/2022   1. The pancreatic inflammatory changes described ON 8/5/2022 have resolved. 2. Mild fatty infiltration is suggested in the pancreatic parenchyma. No pancreatic mass or pseudocyst is seen. 3. Probable small bilateral renal cysts. 4. Cholecystectomy.  Electronically Signed By-Lauren Bassett MD On:8/17/2022 11:45 AM This report was finalized on [id number] by  Lauren Bassett MD.    CT Abdomen Pelvis With Contrast    Result Date: 8/5/2022  Haziness of the fat around the uncinate process of the pancreas is suggestive of mild pancreatic head pancreatitis. Correlate with serum pancreatic enzyme levels. No other acute process is suggested on this exam  Electronically Signed By-Mukul Borden On:8/5/2022 12:59 PM This report was finalized on [id number] by  Mukul Borden, .      Physical Exam  Vitals reviewed.   Constitutional:       Appearance: Normal appearance. She is well-developed.   HENT:      Head: Normocephalic and atraumatic.   Eyes:      General:         Right eye: No discharge.         Left eye: No discharge.   Cardiovascular:      Rate and Rhythm: Normal  rate and regular rhythm.      Heart sounds: Normal heart sounds. No murmur heard.    No friction rub. No gallop.   Pulmonary:      Effort: Pulmonary effort is normal. No respiratory distress.      Breath sounds: Normal breath sounds. No wheezing or rales.   Abdominal:      General: Abdomen is flat. Bowel sounds are normal. There is no distension.      Palpations: Abdomen is soft. There is no mass.      Tenderness: There is no abdominal tenderness. There is no guarding or rebound.      Hernia: No hernia is present.   Skin:     General: Skin is warm and dry.      Findings: No rash.   Neurological:      Mental Status: She is alert and oriented to person, place, and time.      Coordination: Coordination normal.      Gait: Gait normal.   Psychiatric:         Behavior: Behavior is cooperative.         Assessment and Plan:  Problems Addressed this Visit        Cardiac and Vasculature    Mixed hyperlipidemia     Lipid abnormalities are worsening.  Nutritional counseling was provided. and Pharmacotherapy as ordered.  Lipids will be reassessed in 3 months.         Relevant Medications    rosuvastatin (CRESTOR) 5 MG tablet       Endocrine and Metabolic    Class 3 severe obesity due to excess calories with serious comorbidity and body mass index (BMI) of 40.0 to 44.9 in adult (HCC)     Patient's (Body mass index is 42.87 kg/m².) indicates that they are obese (BMI >30) with health conditions that include dyslipidemias . Weight is worsening. BMI is is above average; BMI management plan is completed. We discussed portion control and increasing exercise.             Gastrointestinal Abdominal     GERD (gastroesophageal reflux disease)     Improving  Limit tobacco, alcohol, caffeine, chocalate, citrus fruits, recumbency after meals and large portions. Discussed link between PPI's and increased risk of hip, wrist, and spine fractures           Acute pancreatitis     resolved           Other Visit Diagnoses     Right upper quadrant  abdominal pain    -  Primary      Diagnoses       Codes Comments    Right upper quadrant abdominal pain    -  Primary ICD-10-CM: R10.11  ICD-9-CM: 789.01     Class 3 severe obesity due to excess calories with serious comorbidity and body mass index (BMI) of 40.0 to 44.9 in adult ([facility])     ICD-10-CM: E66.01, Z68.41  ICD-9-CM: 278.01, V85.41     Gastroesophageal reflux disease, unspecified whether esophagitis present     ICD-10-CM: K21.9  ICD-9-CM: 530.81     Idiopathic acute pancreatitis, unspecified complication status     ICD-10-CM: K85.00  ICD-9-CM: 577.0     Mixed hyperlipidemia     ICD-10-CM: E78.2  ICD-9-CM: 272.2            Class 3 Severe Obesity (BMI >=40). Obesity-related health conditions include the following: dyslipidemias. Obesity is worsening. BMI is is above average; BMI management plan is completed. We discussed low calorie, low carb based diet program, portion control and increasing exercise.      An After Visit Summary and PPPS were given to the patient.       I wore protective equipment throughout this patient encounter to include mask and eyewear. Hand hygiene was performed before donning protective equipment and after removal when leaving the room.

## 2025-01-12 NOTE — ED NOTES
Pt presents ambulatory to ED complaining of Suprapubic and bilateral flank pain. Pt is alert and oriented x 4, RR even and unlabored, skin is warm and dry. Assesment completed and pt updated on plan of care.       Emergency Department Nursing Plan of Care       The Nursing Plan of Care is developed from the Nursing assessment and Emergency Department Attending provider initial evaluation.  The plan of care may be reviewed in the “ED Provider note”.    The Plan of Care was developed with the following considerations:   Patient / Family readiness to learn indicated by:verbalized understanding  Persons(s) to be included in education: patient  Barriers to Learning/Limitations:None    Signed     Adelfo Glynn RN    1/11/2025   10:43 PM

## 2025-01-12 NOTE — ED PROVIDER NOTES
Lqcr  Generic drug: Benzoyl Peroxide ER  Use to cleanse face, one to two times daily for acne     albuterol sulfate  (90 Base) MCG/ACT inhaler  Commonly known as: PROVENTIL;VENTOLIN;PROAIR  INHALE 1-2 PUFFS INTO THE LUNGS EVERY 4 HOURS AS NEEDED FOR WHEEZING OR SHORTNESS OF BREATH (COUGH)     diclofenac sodium 1 % Gel  Commonly known as: VOLTAREN  Apply 4 g topically 4 times daily     fluconazole 150 MG tablet  Commonly known as: DIFLUCAN  Take 1 tablet by mouth once for 1 dose Can repeat in 72 hours if still symptomatic  Ask about: Should I take this medication?     ibuprofen 800 MG tablet  Commonly known as: ADVIL;MOTRIN  Take 1 tablet by mouth every 8 hours as needed for Pain     medroxyPROGESTERone 150 MG/ML injection  Commonly known as: DEPO-PROVERA  INJECT 1 ML BY INTRAMUSCULAR ROUTE EVERY THREE (3) MONTHS.     pantoprazole 40 MG tablet  Commonly known as: PROTONIX  Take 1 tablet by mouth every morning (before breakfast)     vitamin D 1.25 MG (39575 UT) Caps capsule  Commonly known as: ERGOCALCIFEROL  TAKE 1 CAPSULE BY MOUTH EVERY 7 DAYS     Xanax 1 MG tablet  Generic drug: ALPRAZolam               Where to Get Your Medications        These medications were sent to [facility]/pharmacy #[id number] - [place], [place] - [address] [phone] - F [phone]  [address] [facility] [address]      Hours: 24-hours Phone: [phone]   fluconazole 150 MG tablet  metroNIDAZOLE 500 MG tablet           DISCONTINUED MEDICATIONS:  Discharge Medication List as of 1/11/2025 11:39 PM          I am the Primary Clinician of Record.   Rodolfo Gomez DO (electronically signed)      (Please note that parts of this dictation were completed with voice recognition software. Quite often unanticipated grammatical, syntax, homophones, and other interpretive errors are inadvertently transcribed by the computer software. Please disregards these errors. Please excuse any errors that have escaped final 
Procedure To Be Performed At Next Visit: Liquid nitrogen
Detail Level: Simple
Introduction Text (Please End With A Colon): The following procedure was deferred:

## 2025-01-21 ENCOUNTER — HOSPITAL ENCOUNTER (OUTPATIENT)
Facility: HOSPITAL | Age: 40
Discharge: HOME | End: 2025-01-24
Attending: INTERNAL MEDICINE
Payer: MEDICAID

## 2025-01-21 DIAGNOSIS — R19.7 DIARRHEA, UNSPECIFIED TYPE: ICD-10-CM

## 2025-01-21 DIAGNOSIS — R10.30 LOWER ABDOMINAL PAIN: ICD-10-CM

## 2025-01-21 DIAGNOSIS — R10.9 ABDOMINAL DISCOMFORT: ICD-10-CM

## 2025-01-21 DIAGNOSIS — K21.9 GASTROESOPHAGEAL REFLUX DISEASE IN INFANT: ICD-10-CM

## 2025-01-21 PROCEDURE — 74177 CT ABD & PELVIS W/CONTRAST: CPT

## 2025-01-21 PROCEDURE — 6360000004 HC RX CONTRAST MEDICATION: Performed by: INTERNAL MEDICINE

## 2025-01-21 RX ORDER — IOPAMIDOL 755 MG/ML
100 INJECTION, SOLUTION INTRAVASCULAR
Status: COMPLETED | OUTPATIENT
Start: 2025-01-21 | End: 2025-01-21

## 2025-01-21 RX ADMIN — IOPAMIDOL 100 ML: 755 INJECTION, SOLUTION INTRAVENOUS at 12:46

## 2025-02-07 ENCOUNTER — TELEPHONE (OUTPATIENT)
Facility: CLINIC | Age: 40
End: 2025-02-07

## 2025-02-07 NOTE — TELEPHONE ENCOUNTER
----- Message from Severino MARTINS sent at 2/7/2025  1:25 PM EST -----  Regarding: [facility] Appointment Request  [facility] Appointment Request    Patient needs appointment for [facility] Appointment Type: ED Follow-Up.    Patient Requested Dates(s):as soon as possible  Patient Requested Time:anytime of the day  Provider Name:   Denice Benoit APRN - NP        Reason for Appointment Request: Established Patient - No appointments available during search  --------------------------------------------------------------------------------------------------------------------------    Relationship to Patient: Self     Call Back Information: OK to leave message on voicemail  Preferred Call Back Number: [phone] (home)

## 2025-03-28 ENCOUNTER — HOSPITAL ENCOUNTER (EMERGENCY)
Facility: HOSPITAL | Age: 40
Discharge: HOME | End: 2025-03-28
Payer: MEDICAID

## 2025-03-28 VITALS
DIASTOLIC BLOOD PRESSURE: 85 MMHG | HEART RATE: 65 BPM | RESPIRATION RATE: 18 BRPM | TEMPERATURE: 97.7 F | SYSTOLIC BLOOD PRESSURE: 115 MMHG | WEIGHT: 192 LBS | BODY MASS INDEX: 31.99 KG/M2 | HEIGHT: 65 IN | OXYGEN SATURATION: 99 %

## 2025-03-28 DIAGNOSIS — N76.0 VAGINITIS AND VULVOVAGINITIS: Primary | ICD-10-CM

## 2025-03-28 DIAGNOSIS — R10.30 LOWER ABDOMINAL PAIN: ICD-10-CM

## 2025-03-28 LAB
APPEARANCE UR: CLEAR
BACTERIA URNS QL MICRO: NEGATIVE /HPF
BILIRUB UR QL: NEGATIVE
C TRACH DNA SPEC QL NAA+PROBE: NEGATIVE
CLUE CELLS VAG QL WET PREP: NORMAL
COLOR UR: ABNORMAL
EPITH CASTS URNS QL MICRO: ABNORMAL /LPF
GLUCOSE UR STRIP.AUTO-MCNC: NEGATIVE MG/DL
HCG UR QL: NEGATIVE
HGB UR QL STRIP: NEGATIVE
KETONES UR QL STRIP.AUTO: NEGATIVE MG/DL
LEUKOCYTE ESTERASE UR QL STRIP.AUTO: NEGATIVE
N GONORRHOEA DNA SPEC QL NAA+PROBE: NEGATIVE
NITRITE UR QL STRIP.AUTO: NEGATIVE
PH UR STRIP: 7 (ref 5–8)
PROT UR STRIP-MCNC: ABNORMAL MG/DL
RBC #/AREA URNS HPF: ABNORMAL /HPF (ref 0–5)
SAMPLE TYPE: NORMAL
SERVICE CMNT-IMP: NORMAL
SP GR UR REFRACTOMETRY: 1.02
SPECIMEN SOURCE: NORMAL
T VAGINALIS VAG QL WET PREP: NORMAL
URINE CULTURE IF INDICATED: ABNORMAL
UROBILINOGEN UR QL STRIP.AUTO: 1 EU/DL (ref 0.2–1)
WBC URNS QL MICRO: ABNORMAL /HPF (ref 0–4)
YEAST WET PREP: NORMAL

## 2025-03-28 PROCEDURE — 99283 EMERGENCY DEPT VISIT LOW MDM: CPT

## 2025-03-28 PROCEDURE — 87210 SMEAR WET MOUNT SALINE/INK: CPT

## 2025-03-28 PROCEDURE — 81001 URINALYSIS AUTO W/SCOPE: CPT

## 2025-03-28 PROCEDURE — 6370000000 HC RX 637 (ALT 250 FOR IP): Performed by: PHYSICIAN ASSISTANT

## 2025-03-28 PROCEDURE — 81025 URINE PREGNANCY TEST: CPT

## 2025-03-28 PROCEDURE — 87591 N.GONORRHOEAE DNA AMP PROB: CPT

## 2025-03-28 PROCEDURE — 87491 CHLMYD TRACH DNA AMP PROBE: CPT

## 2025-03-28 RX ORDER — FLUCONAZOLE 150 MG/1
150 TABLET ORAL
Status: COMPLETED | OUTPATIENT
Start: 2025-03-28 | End: 2025-03-28

## 2025-03-28 RX ADMIN — FLUCONAZOLE 150 MG: 150 TABLET ORAL at 10:57

## 2025-03-28 ASSESSMENT — PAIN - FUNCTIONAL ASSESSMENT: PAIN_FUNCTIONAL_ASSESSMENT: 0-10

## 2025-03-28 ASSESSMENT — PAIN DESCRIPTION - ONSET: ONSET: GRADUAL

## 2025-03-28 ASSESSMENT — PAIN DESCRIPTION - LOCATION: LOCATION: ABDOMEN

## 2025-03-28 ASSESSMENT — PAIN DESCRIPTION - PAIN TYPE: TYPE: ACUTE PAIN

## 2025-03-28 ASSESSMENT — PAIN SCALES - GENERAL: PAINLEVEL_OUTOF10: 8

## 2025-03-28 ASSESSMENT — PAIN DESCRIPTION - FREQUENCY: FREQUENCY: INTERMITTENT

## 2025-03-28 ASSESSMENT — PAIN DESCRIPTION - DESCRIPTORS: DESCRIPTORS: ACHING;SHOOTING

## 2025-03-28 ASSESSMENT — PAIN DESCRIPTION - ORIENTATION: ORIENTATION: LOWER

## 2025-03-28 NOTE — ED TRIAGE NOTES
Patient states she is here today with c/o lower abdominal pain that she states shoots to her back.  She admits to white discharge.  She admits to unprotected sex.  She admits to pain with urination that she states feels like pressure and feels like she has to urinate after she is done urinating.

## 2025-03-28 NOTE — ED PROVIDER NOTES
[facility] EMERGENCY DEPARTMENT  EMERGENCY DEPARTMENT ENCOUNTER         Pt Name: Pat Miner  MRN: [id number]  Birthdate 1985  Date of evaluation: 3/28/2025  Provider: Jennifer Eagle PA-C   PCP: Denice Benoit APRN - NP  Note Started: 10:04 AM EDT 3/28/25     CHIEF COMPLAINT       Chief Complaint   Patient presents with    Abdominal Pain     Patient states she is here today with c/o lower abdominal pain that she states shoots to her back.  She admits to white discharge.  She admits to unprotected sex.  She admits to pain with urination that she states feels like pressure and feels like she has to urinate after she is done urinating.         HISTORY OF PRESENT ILLNESS: 1 or more elements      History From: Patient  HPI Limitations: None  Arrival Mode: Private vehicle     Pat Miner is a 39 y.o. female who presents with lower abdominal cramping today and vaginal discharge x 3 days.  She denies any nausea, vomiting, fever, chills.  LMP unknown stating she has not had a menstrual cycle since getting off Depo several years ago     Nursing Notes were all reviewed and agreed with or any disagreements were addressed in the HPI.  Please see MDM for additional details of HPI and ROS     REVIEW OF SYSTEMS      Review of Systems     Positives and Pertinent negatives as per HPI.    PAST HISTORY     Past Medical History:  Past Medical History:   Diagnosis Date    Anxiety     Asthma     Back pain     Bipolar 1 disorder (HCC)     IBS (irritable bowel syndrome)     Other ill-defined conditions(799.89)     Frequent Boils    Scoliosis        Past Surgical History:  Past Surgical History:   Procedure Laterality Date    DILATION AND CURETTAGE OF UTERUS N/A 6/18/2024    DILATATION AND CURETTAGE WITH SUCTION performed by Юлия Noguera MD at [facility] AMBULATORY OR    TONSILLECTOMY      WISDOM TOOTH EXTRACTION         Family History:  Family History   Problem Relation Age of Onset    Cancer Neg Hx      NEG      Leukocyte Esterase, Urine Negative NEG      WBC, UA 0-4 0 - 4 /hpf    RBC, UA 0-5 0 - 5 /hpf    Epithelial Cells, UA FEW FEW /lpf    BACTERIA, URINE Negative NEG /hpf    Urine Culture if Indicated CULTURE NOT INDICATED BY UA RESULT CNI     Wet Prep    Collection Time: 03/28/25  9:55 AM    Specimen: Miscellaneous sample   Result Value Ref Range    Clue Cells, Wet Prep NONE SEEN NOSEE      Trich, Wet Prep NONE SEEN NOSEE      Yeast, Wet Prep NONE SEEN NOSEE     POC Pregnancy Urine Qual    Collection Time: 03/28/25 10:07 AM   Result Value Ref Range    Preg Test, Ur Negative NEG         Unless otherwise noted below, none  Procedures       FINAL IMPRESSION     1. Vaginitis and vulvovaginitis    2. Lower abdominal pain          DISPOSITION/PLAN   DISPOSITION Decision To Discharge 03/28/2025 11:07:05 AM   DISPOSITION CONDITION Stable       Care plan outlined and precautions discussed.  Patient has no new complaints, changes, or physical findings.  Results of UA and pelvic swabs were reviewed with the patient. All medications were reviewed with the patient; will d/c home. All of pt's questions and concerns were addressed. Patient was instructed and agrees to follow up with PCP as needed, as well as to return to the ED upon further deterioration. Patient is ready to go home.      PATIENT REFERRED TO:  Denice Benoit, APRN - NP  [address] [address] [phone]    In 1 week  As needed       DISCHARGE MEDICATIONS:     Medication List        ASK your doctor about these medications      acetaminophen 500 MG tablet  Commonly known as: TYLENOL  Take 2 tablets by mouth every 6 hours as needed for Pain     Advanced Acne Wash 4.4 % Lqcr  Generic drug: Benzoyl Peroxide ER  Use to cleanse face, one to two times daily for acne     albuterol sulfate  (90 Base) MCG/ACT inhaler  Commonly known as: PROVENTIL;VENTOLIN;PROAIR  INHALE 1-2 PUFFS INTO THE LUNGS EVERY 4 HOURS AS NEEDED FOR

## 2025-03-28 NOTE — DISCHARGE INSTRUCTIONS
Please make sure we have your correct number.   1) If your gonorrhea or chlamydia tests are positive and you have not been treated, we will give you a call. If you have been treated in the Emergency department, you will receive a letter with the results.    2) You need to follow up for further STD testing such as HIV, Hepatitis C and syphilis as there is a chance you could have contracted this and we do not test for this in ED. Follow up with the health department for further testing (see locations below).    3) Condoms reduce the risk of catching a sexual related disease.  Please make sure to have protected sex at all times.     For any future STD testing, please go to any of these locations for testing and treatment:    [facility]  [address] [place] [address]  [phone] [facility]  [address] [place] [address]  [phone]   [facility]  [address] [place] [address]  [phone] [facility]  [address] [place] [address]  [phone]   [facility]  [address] [place] [address]  [phone] [facility]  [address] [place] [address]  [phone]   [facility]  [address] [place] [address]  [phone] [facility]  [address] [place] [address]  [phone]         It was a pleasure taking care of you at [facility] today.      We know that when you come to [facility], you are entrusting us with your health, comfort, and safety.  Our physicians and nurses honor that trust, and we appreciate the opportunity to care for you and your loved ones.  We also value your feedback, and we would like to hear from you.    When you receive a  >>> survey <<< about your Emergency Department experience today.   Please fill it out and consider giving us all 5's if you had a  good experience. We review every single response from our patients. Thank you!

## 2025-05-25 ENCOUNTER — HOSPITAL ENCOUNTER (EMERGENCY)
Facility: HOSPITAL | Age: 40
Discharge: HOME | End: 2025-05-25
Payer: MEDICAID

## 2025-05-25 VITALS
DIASTOLIC BLOOD PRESSURE: 77 MMHG | BODY MASS INDEX: 29.99 KG/M2 | TEMPERATURE: 97.7 F | WEIGHT: 180 LBS | RESPIRATION RATE: 18 BRPM | SYSTOLIC BLOOD PRESSURE: 109 MMHG | HEART RATE: 81 BPM | OXYGEN SATURATION: 98 % | HEIGHT: 65 IN

## 2025-05-25 DIAGNOSIS — L03.315 CELLULITIS, PERINEUM: Primary | ICD-10-CM

## 2025-05-25 PROCEDURE — 99283 EMERGENCY DEPT VISIT LOW MDM: CPT

## 2025-05-25 RX ORDER — CEPHALEXIN 500 MG/1
500 CAPSULE ORAL 4 TIMES DAILY
Qty: 28 CAPSULE | Refills: 0 | Status: SHIPPED | OUTPATIENT
Start: 2025-05-25 | End: 2025-06-01

## 2025-05-25 RX ORDER — DOXYCYCLINE 100 MG/1
100 CAPSULE ORAL 2 TIMES DAILY
Qty: 20 CAPSULE | Refills: 0 | Status: SHIPPED | OUTPATIENT
Start: 2025-05-25 | End: 2025-06-04

## 2025-05-25 ASSESSMENT — PAIN - FUNCTIONAL ASSESSMENT: PAIN_FUNCTIONAL_ASSESSMENT: 0-10

## 2025-05-25 ASSESSMENT — PAIN SCALES - GENERAL: PAINLEVEL_OUTOF10: 8

## 2025-05-25 ASSESSMENT — PAIN DESCRIPTION - ORIENTATION: ORIENTATION: LOWER

## 2025-05-25 ASSESSMENT — PAIN DESCRIPTION - DESCRIPTORS: DESCRIPTORS: SORE

## 2025-05-25 ASSESSMENT — PAIN DESCRIPTION - LOCATION: LOCATION: ABDOMEN

## 2025-05-25 NOTE — ED NOTES
Pt presents to ED complaining of a possible cyst on her lower suprapubic region x 3 weeks. Pt reports she has a hx of getting them but is usually able to care for it at home. Pt reports using warm compresses with no relief. Pt denies any drainage at this time. Pt is alert and oriented x 4, RR even and unlabored, skin is warm and dry. Pt appears in NAD at this time. Assessment completed and pt updated on plan of care.  Call bell in reach.    Emergency Department Nursing Plan of Care  The Nursing Plan of Care is developed from the Nursing assessment and Emergency Department Attending provider initial evaluation.  The plan of care may be reviewed in the “ED Provider note”.      The Plan of Care was developed with the following considerations:  Patient / Family readiness to learn indicated by:Refer to Medical chart in [facility]  Persons(s) to be included in education: Refer to Medical chart in [facility]  Barriers to Learning/Limitations:Normal     Signed    Iesha Harvey RN  5/25/2025 11:49 AM

## 2025-05-25 NOTE — ED PROVIDER NOTES
[facility] EMERGENCY DEPARTMENT  EMERGENCY DEPARTMENT ENCOUNTER       Pt Name: Pat Miner  MRN: [id number]  Birthdate 1985  Date of evaluation: 5/25/2025  Provider: ARLETH Gary NP   PCP: Denice Benoit APRN - NP  Note Started: 6:12 PM 5/25/25     CHIEF COMPLAINT       Chief Complaint   Patient presents with    Skin Problem     Patient presents to ED with c/o abscess to groin area after shaving        HISTORY OF PRESENT ILLNESS: 1 or more elements      History Provided by: Patient   History is limited by: Nothing     Pat Miner is a 39 y.o. female who presents cc abscess to groin area after shaving.  States area is painful and red.  Denies drainage     Nursing Notes were all reviewed and agreed with or any disagreements were addressed in the HPI.     REVIEW OF SYSTEMS      Review of Systems   Constitutional:  Negative for fever.   HENT:  Negative for congestion.    Eyes:  Negative for visual disturbance.   Respiratory:  Negative for shortness of breath.    Cardiovascular:  Negative for chest pain.   Gastrointestinal:  Negative for abdominal pain.   Musculoskeletal:  Negative for back pain and neck pain.   Skin:  Negative for rash.        Abscess   Neurological:  Negative for dizziness, weakness and headaches.   Psychiatric/Behavioral:  Positive for behavioral problems.    All other systems reviewed and are negative.       Positives and Pertinent negatives as per HPI.    PAST HISTORY     Past Medical History:  Past Medical History:   Diagnosis Date    Anxiety     Asthma     Back pain     Bipolar 1 disorder (HCC)     IBS (irritable bowel syndrome)     Other ill-defined conditions(799.89)     Frequent Boils    Scoliosis        Past Surgical History:  Past Surgical History:   Procedure Laterality Date    DILATION AND CURETTAGE OF UTERUS N/A 6/18/2024    DILATATION AND CURETTAGE WITH SUCTION performed by Юлия Noguera MD at [facility] AMBULATORY OR    TONSILLECTOMY      WISDOM TOOTH

## 2025-05-25 NOTE — ED NOTES
Discharge instructions were given to the patient by VANNA Julian.     The patient left the Emergency Department alert and oriented and in no acute distress with 2 prescriptions. The patient was encouraged to call or return to the ED for worsening issues or problems and was encouraged to schedule a follow up appointment for continuing care.     Ambulation assessment completed before discharge.  Pt left Emergency Department ambulating at baseline with no ortho devices  Ortho device education: none    The patient verbalized understanding of discharge instructions and prescriptions, all questions were answered. The patient has no further concerns at this time.

## 2025-06-14 ENCOUNTER — HOSPITAL ENCOUNTER (EMERGENCY)
Facility: HOSPITAL | Age: 40
Discharge: HOME | End: 2025-06-14
Payer: MEDICAID

## 2025-06-14 VITALS
RESPIRATION RATE: 18 BRPM | WEIGHT: 183.86 LBS | OXYGEN SATURATION: 96 % | SYSTOLIC BLOOD PRESSURE: 110 MMHG | DIASTOLIC BLOOD PRESSURE: 69 MMHG | HEART RATE: 87 BPM | BODY MASS INDEX: 30.6 KG/M2 | TEMPERATURE: 98.6 F
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
## 2025-06-14 DIAGNOSIS — S80.261A INSECT BITE OF RIGHT KNEE, INITIAL ENCOUNTER: Primary | ICD-10-CM

## 2025-06-14 PROCEDURE — 99283 EMERGENCY DEPT VISIT LOW MDM: CPT

## 2025-06-14 RX ORDER — BENZOCAINE/MENTHOL 6 MG-10 MG
LOZENGE MUCOUS MEMBRANE 3 TIMES DAILY
Qty: 20 G | Refills: 0 | Status: SHIPPED | OUTPATIENT
Start: 2025-06-14 | End: 2025-06-19

## 2025-06-14 NOTE — ED NOTES
Discharge instructions were given to the patient by Юлия PRABHAKAR. The patient left the Emergency Department ambulatory, alert and oriented and in no acute distress with 1 prescriptions. The patient was encouraged to call or return to the ED for worsening issues or problems and was encouraged to schedule a follow up appointment for continuing care. The patient verbalized understanding of discharge instructions and prescriptions, all questions were answered. The patient has no further concerns at this time.

## 2025-06-14 NOTE — ED PROVIDER NOTES
[facility] EMERGENCY DEPARTMENT  EMERGENCY DEPARTMENT ENCOUNTER       Pt Name: Pat Miner  MRN: [id number]  Birthdate 1985  Date of evaluation: 6/14/2025  Provider: THOMAS Fermin   PCP: Denice Benoit APRN - NP  Note Started: 6:20 PM EDT 6/14/25     CHIEF COMPLAINT       Chief Complaint   Patient presents with    Insect Bite     Back of right knee        HISTORY OF PRESENT ILLNESS: 1 or more elements      History From: Patient  HPI Limitations: None  Arrival mode:    Pat Miner is a 39 y.o. female who presents with CC of insect bite to the back of the right knee sustained 1 hour ago.  The patient was outside and reports feeling a sharp pain in the back of her knee.  She looked down and realized the area was swollen.  Now while here in the ED she notes the swelling has gone down, but the area is itchy.     Nursing Notes were all reviewed and agreed with or any disagreements were addressed in the HPI.   Please see MDM for additional details of HPI and ROS  REVIEW OF SYSTEMS      Review of Systems   Skin:         Insect sting/bite        Positives and Pertinent negatives as per HPI.    PAST HISTORY     Past Medical History:  Past Medical History:   Diagnosis Date    Anxiety     Asthma     Back pain     Bipolar 1 disorder (HCC)     IBS (irritable bowel syndrome)     Other ill-defined conditions(799.89)     Frequent Boils    Scoliosis        Past Surgical History:  Past Surgical History:   Procedure Laterality Date    DILATION AND CURETTAGE OF UTERUS N/A 6/18/2024    DILATATION AND CURETTAGE WITH SUCTION performed by Юлия Noguera MD at [facility] AMBULATORY OR    TONSILLECTOMY      WISDOM TOOTH EXTRACTION         Family History:  Family History   Problem Relation Age of Onset    Cancer Neg Hx     Hypertension Neg Hx     Stroke Neg Hx     Diabetes Neg Hx        Social History:  Social History     Tobacco Use    Smoking status: Every Day     Current packs/day: 0.00     Types: Cigarettes  this dictation were completed with voice recognition software. Quite often unanticipated grammatical, syntax, homophones, and other interpretive errors are inadvertently transcribed by the computer software. Please disregards these errors. Please excuse any errors that have escaped final proofreading.)     Teri Rutledge PA  06/14/25 [id number]

## 2025-06-14 NOTE — ED NOTES
Pt presents to ED ambulatory complaining of pain on the posterior of the right knee d/t being stung by bug. Pt states concern that it may be a spider bite. Pt endorses swelling that lasted about 20 minutes. Pt also endorses mosquitoes around her. Pt reports itching. Pt denies SOB or fever, chills. Pt is alert and oriented x 4, RR even and unlabored, skin is warm and dry. Assessment completed and pt updated on plan of care.  Call bell in reach.      Emergency Department Nursing Plan of Care       The Nursing Plan of Care is developed from the Nursing assessment and Emergency Department Attending provider initial evaluation.  The plan of care may be reviewed in the “ED Provider note”.    The Plan of Care was developed with the following considerations:   Patient / Family readiness to learn indicated by:verbalized understanding  Persons(s) to be included in education: patient  Barriers to Learning/Limitations:None    Signed

## 2025-06-14 NOTE — ED TRIAGE NOTES
Pt reports she was outside and she felt a sting in the back of her right knee and thinks might be a spider bite and it got swollen about 20 minutes ago. Pt also stated mosquitos where everywhere and might bitten her and she's been itching all over. Pt denies shortness of breath.

## 2025-07-22 ENCOUNTER — OFFICE VISIT (OUTPATIENT)
Facility: CLINIC | Age: 40
End: 2025-07-22
Payer: MEDICAID

## 2025-07-22 VITALS
TEMPERATURE: 97 F | OXYGEN SATURATION: 99 % | HEIGHT: 65 IN | DIASTOLIC BLOOD PRESSURE: 80 MMHG | RESPIRATION RATE: 17 BRPM | WEIGHT: 183 LBS | SYSTOLIC BLOOD PRESSURE: 122 MMHG | HEART RATE: 77 BPM | BODY MASS INDEX: 30.49 KG/M2

## 2025-07-22 DIAGNOSIS — Z00.00 ADULT GENERAL MEDICAL EXAMINATION: Primary | ICD-10-CM

## 2025-07-22 DIAGNOSIS — Z87.898 HISTORY OF PREDIABETES: ICD-10-CM

## 2025-07-22 DIAGNOSIS — K21.9 GERD WITHOUT ESOPHAGITIS: ICD-10-CM

## 2025-07-22 DIAGNOSIS — F43.21 GRIEF: ICD-10-CM

## 2025-07-22 DIAGNOSIS — Z13.228 SCREENING FOR ENDOCRINE, NUTRITIONAL, METABOLIC AND IMMUNITY DISORDER: ICD-10-CM

## 2025-07-22 DIAGNOSIS — Z13.220 SCREENING FOR LIPID DISORDERS: ICD-10-CM

## 2025-07-22 DIAGNOSIS — N93.9 VAGINAL SPOTTING: ICD-10-CM

## 2025-07-22 DIAGNOSIS — F31.9 BIPOLAR 1 DISORDER (HCC): ICD-10-CM

## 2025-07-22 DIAGNOSIS — K76.0 METABOLIC DYSFUNCTION-ASSOCIATED STEATOTIC LIVER DISEASE (MASLD): ICD-10-CM

## 2025-07-22 DIAGNOSIS — N94.10 DYSPAREUNIA IN FEMALE: ICD-10-CM

## 2025-07-22 DIAGNOSIS — K44.9 HIATAL HERNIA: ICD-10-CM

## 2025-07-22 DIAGNOSIS — E55.9 VITAMIN D DEFICIENCY: ICD-10-CM

## 2025-07-22 DIAGNOSIS — Z13.29 SCREENING FOR ENDOCRINE, NUTRITIONAL, METABOLIC AND IMMUNITY DISORDER: ICD-10-CM

## 2025-07-22 DIAGNOSIS — Z11.4 SCREENING FOR HIV WITHOUT PRESENCE OF RISK FACTORS: ICD-10-CM

## 2025-07-22 DIAGNOSIS — S80.869A INSECT BITE OF LOWER LEG, UNSPECIFIED LATERALITY, INITIAL ENCOUNTER: ICD-10-CM

## 2025-07-22 DIAGNOSIS — Z13.0 SCREENING FOR ENDOCRINE, NUTRITIONAL, METABOLIC AND IMMUNITY DISORDER: ICD-10-CM

## 2025-07-22 DIAGNOSIS — J45.20 MILD INTERMITTENT REACTIVE AIRWAY DISEASE WITHOUT COMPLICATION: ICD-10-CM

## 2025-07-22 DIAGNOSIS — L50.9 HIVES: ICD-10-CM
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
## 2025-07-22 DIAGNOSIS — Z13.21 SCREENING FOR ENDOCRINE, NUTRITIONAL, METABOLIC AND IMMUNITY DISORDER: ICD-10-CM

## 2025-07-22 PROCEDURE — 99214 OFFICE O/P EST MOD 30 MIN: CPT | Performed by: NURSE PRACTITIONER

## 2025-07-22 PROCEDURE — 99395 PREV VISIT EST AGE 18-39: CPT | Performed by: NURSE PRACTITIONER

## 2025-07-22 RX ORDER — HYDROXYZINE HYDROCHLORIDE 25 MG/1
25 TABLET, FILM COATED ORAL EVERY 8 HOURS PRN
Qty: 30 TABLET | Refills: 0 | Status: SHIPPED | OUTPATIENT
Start: 2025-07-22 | End: 2025-08-01

## 2025-07-22 RX ORDER — FAMOTIDINE 40 MG/1
40 TABLET, FILM COATED ORAL EVERY EVENING
Qty: 30 TABLET | Refills: 3 | Status: SHIPPED | OUTPATIENT
Start: 2025-07-22

## 2025-07-22 RX ORDER — FEXOFENADINE HCL 180 MG/1
180 TABLET ORAL 3 TIMES DAILY PRN
Qty: 90 TABLET | Refills: 3 | Status: SHIPPED | OUTPATIENT
Start: 2025-07-22

## 2025-07-22 RX ORDER — ALBUTEROL SULFATE 90 UG/1
1-2 INHALANT RESPIRATORY (INHALATION) EVERY 4 HOURS PRN
Qty: 18 EACH | Refills: 1 | Status: SHIPPED | OUTPATIENT
Start: 2025-07-22

## 2025-07-22 RX ORDER — ERGOCALCIFEROL 1.25 MG/1
50000 CAPSULE, LIQUID FILLED ORAL
Qty: 12 CAPSULE | Refills: 3 | Status: SHIPPED | OUTPATIENT
Start: 2025-07-22

## 2025-07-22 RX ORDER — QUETIAPINE FUMARATE 25 MG/1
25 TABLET, FILM COATED ORAL NIGHTLY
Qty: 30 TABLET | Refills: 2 | Status: SHIPPED | OUTPATIENT
Start: 2025-07-22

## 2025-07-22 SDOH — ECONOMIC STABILITY: FOOD INSECURITY: WITHIN THE PAST 12 MONTHS, YOU WORRIED THAT YOUR FOOD WOULD RUN OUT BEFORE YOU GOT MONEY TO BUY MORE.: NEVER TRUE

## 2025-07-22 SDOH — ECONOMIC STABILITY: FOOD INSECURITY: WITHIN THE PAST 12 MONTHS, THE FOOD YOU BOUGHT JUST DIDN'T LAST AND YOU DIDN'T HAVE MONEY TO GET MORE.: NEVER TRUE

## 2025-07-22 ASSESSMENT — PATIENT HEALTH QUESTIONNAIRE - PHQ9
1. LITTLE INTEREST OR PLEASURE IN DOING THINGS: NOT AT ALL
7. TROUBLE CONCENTRATING ON THINGS, SUCH AS READING THE NEWSPAPER OR WATCHING TELEVISION: NOT AT ALL
6. FEELING BAD ABOUT YOURSELF - OR THAT YOU ARE A FAILURE OR HAVE LET YOURSELF OR YOUR FAMILY DOWN: NOT AT ALL
SUM OF ALL RESPONSES TO PHQ QUESTIONS 1-9: 0
8. MOVING OR SPEAKING SO SLOWLY THAT OTHER PEOPLE COULD HAVE NOTICED. OR THE OPPOSITE, BEING SO FIGETY OR RESTLESS THAT YOU HAVE BEEN MOVING AROUND A LOT MORE THAN USUAL: NOT AT ALL
9. THOUGHTS THAT YOU WOULD BE BETTER OFF DEAD, OR OF HURTING YOURSELF: NOT AT ALL
3. TROUBLE FALLING OR STAYING ASLEEP: NOT AT ALL
SUM OF ALL RESPONSES TO PHQ QUESTIONS 1-9: 0
SUM OF ALL RESPONSES TO PHQ QUESTIONS 1-9: 0
4. FEELING TIRED OR HAVING LITTLE ENERGY: NOT AT ALL
SUM OF ALL RESPONSES TO PHQ QUESTIONS 1-9: 0
2. FEELING DOWN, DEPRESSED OR HOPELESS: NOT AT ALL
10. IF YOU CHECKED OFF ANY PROBLEMS, HOW DIFFICULT HAVE THESE PROBLEMS MADE IT FOR YOU TO DO YOUR WORK, TAKE CARE OF THINGS AT HOME, OR GET ALONG WITH OTHER PEOPLE: NOT DIFFICULT AT ALL
5. POOR APPETITE OR OVEREATING: NOT AT ALL

## 2025-07-22 NOTE — PROGRESS NOTES
Pt is here for   Chief Complaint   Patient presents with    Annual Exam     With labs     Insect Bite     Pt states that she's been using hydrocortisone cream with no relief     Have you been to the ER, urgent care clinic since your last visit?  Hospitalized since your last visit?   NO    Have you seen or consulted any other health care providers outside our system since your last visit?   NO            
PAULETTE Miner expressed understanding of plan. An After Visit Summary was offered/printed and given to the patient.

## 2025-07-22 NOTE — PATIENT INSTRUCTIONS
Eat a balanced diet, low-carb, low-salt AND exercise at least 150-300 minutes per week of moderate activity.  If you begin to feel short of breath, dizzy, lightheaded, or have chest pain; STOP. If your symptoms DO NOT resolve after several minutes, DO NOT resume activity; you may need to call the office, report to an urgent care facility, or ER for chest pain.

## 2025-08-28 ENCOUNTER — TELEPHONE (OUTPATIENT)
Age: 40
End: 2025-08-28

## 2025-08-28 DIAGNOSIS — Z13.21 SCREENING FOR ENDOCRINE, NUTRITIONAL, METABOLIC AND IMMUNITY DISORDER: ICD-10-CM

## 2025-08-28 DIAGNOSIS — Z00.00 ADULT GENERAL MEDICAL EXAMINATION: ICD-10-CM

## 2025-08-28 DIAGNOSIS — Z13.29 SCREENING FOR ENDOCRINE, NUTRITIONAL, METABOLIC AND IMMUNITY DISORDER: ICD-10-CM

## 2025-08-28 DIAGNOSIS — Z13.0 SCREENING FOR ENDOCRINE, NUTRITIONAL, METABOLIC AND IMMUNITY DISORDER: ICD-10-CM

## 2025-08-28 DIAGNOSIS — Z87.898 HISTORY OF PREDIABETES: ICD-10-CM

## 2025-08-28 DIAGNOSIS — E55.9 VITAMIN D DEFICIENCY: ICD-10-CM

## 2025-08-28 DIAGNOSIS — Z11.4 SCREENING FOR HIV WITHOUT PRESENCE OF RISK FACTORS: ICD-10-CM

## 2025-08-28 DIAGNOSIS — Z13.228 SCREENING FOR ENDOCRINE, NUTRITIONAL, METABOLIC AND IMMUNITY DISORDER: ICD-10-CM

## 2025-08-28 DIAGNOSIS — Z13.220 SCREENING FOR LIPID DISORDERS: ICD-10-CM

## 2025-08-29 ENCOUNTER — OFFICE VISIT (OUTPATIENT)
Age: 40
End: 2025-08-29
Payer: MEDICAID

## 2025-08-29 VITALS
OXYGEN SATURATION: 96 % | BODY MASS INDEX: 32.49 KG/M2 | DIASTOLIC BLOOD PRESSURE: 79 MMHG | WEIGHT: 195 LBS | RESPIRATION RATE: 16 BRPM | SYSTOLIC BLOOD PRESSURE: 113 MMHG | HEIGHT: 65 IN | TEMPERATURE: 98.2 F | HEART RATE: 91 BPM

## 2025-08-29 DIAGNOSIS — N94.10 DYSPAREUNIA IN FEMALE: ICD-10-CM

## 2025-08-29 DIAGNOSIS — R10.2 PELVIC PAIN: Primary | ICD-10-CM

## 2025-08-29 DIAGNOSIS — N71.9 ENDOMETRITIS: ICD-10-CM

## 2025-08-29 PROBLEM — N73.9 FEMALE PELVIC INFLAMMATORY DISEASE: Status: RESOLVED | Noted: 2020-01-11 | Resolved: 2025-08-29

## 2025-08-29 PROBLEM — N39.3 FEMALE STRESS INCONTINENCE: Status: ACTIVE | Noted: 2024-10-21

## 2025-08-29 PROBLEM — R10.9 ABDOMINAL DISCOMFORT: Status: ACTIVE | Noted: 2025-08-29

## 2025-08-29 PROBLEM — N92.6 IRREGULAR MENSES: Status: RESOLVED | Noted: 2018-08-28 | Resolved: 2025-08-29

## 2025-08-29 PROBLEM — F60.3 BORDERLINE PERSONALITY DISORDER (HCC): Status: ACTIVE | Noted: 2025-08-29

## 2025-08-29 PROBLEM — A59.9: Status: RESOLVED | Noted: 2020-01-11 | Resolved: 2025-08-29

## 2025-08-29 PROBLEM — M79.672 LEFT FOOT PAIN: Status: RESOLVED | Noted: 2018-05-03 | Resolved: 2025-08-29

## 2025-08-29 PROBLEM — N73.9 FEMALE PELVIC INFLAMMATORY DISEASE: Status: ACTIVE | Noted: 2020-01-11

## 2025-08-29 PROBLEM — K21.9 GASTRO-ESOPHAGEAL REFLUX DISEASE WITHOUT ESOPHAGITIS: Status: ACTIVE | Noted: 2025-08-29

## 2025-08-29 PROBLEM — A59.9: Status: ACTIVE | Noted: 2020-01-11

## 2025-08-29 LAB
25(OH)D3 SERPL-MCNC: 22.1 NG/ML (ref 30–100)
ANION GAP SERPL CALC-SCNC: 10 MMOL/L (ref 2–14)
BASOPHILS # BLD: 0.05 K/UL (ref 0–0.1)
BASOPHILS NFR BLD: 0.8 % (ref 0–1)
BUN SERPL-MCNC: 7 MG/DL (ref 6–20)
BUN/CREAT SERPL: 7 (ref 12–20)
CALCIUM SERPL-MCNC: 9 MG/DL (ref 8.6–10)
CHLORIDE SERPL-SCNC: 105 MMOL/L (ref 98–107)
CHOLEST SERPL-MCNC: 172 MG/DL (ref 0–200)
CO2 SERPL-SCNC: 24 MMOL/L (ref 20–29)
CREAT SERPL-MCNC: 0.99 MG/DL (ref 0.6–1)
DIFFERENTIAL METHOD BLD: NORMAL
EOSINOPHIL # BLD: 0.11 K/UL (ref 0–0.4)
EOSINOPHIL NFR BLD: 1.8 % (ref 0–7)
ERYTHROCYTE [DISTWIDTH] IN BLOOD BY AUTOMATED COUNT: 12.9 % (ref 11.5–14.5)
EST. AVERAGE GLUCOSE BLD GHB EST-MCNC: 116 MG/DL
GLUCOSE SERPL-MCNC: 88 MG/DL (ref 65–100)
HBA1C MFR BLD: 5.7 % (ref 4–5.6)
HCT VFR BLD AUTO: 40.7 % (ref 35–47)
HDLC SERPL-MCNC: 61 MG/DL (ref 40–60)
HDLC SERPL: 2.8 (ref 0–5)
HGB BLD-MCNC: 13.4 G/DL (ref 11.5–16)
HIV 1+2 AB+HIV1 P24 AG SERPL QL IA: NONREACTIVE
HIV 1/2 RESULT COMMENT: NORMAL
IMM GRANULOCYTES # BLD AUTO: 0.02 K/UL (ref 0–0.04)
IMM GRANULOCYTES NFR BLD AUTO: 0.3 % (ref 0–0.5)
INSULIN SERPL-ACNC: 16.6 UIU/ML (ref 2.6–24.9)
LDLC SERPL CALC-MCNC: 91 MG/DL (ref 0–100)
LPA SERPL-SCNC: 106.2 NMOL/L
LYMPHOCYTES # BLD: 1.66 K/UL (ref 0.8–3.5)
LYMPHOCYTES NFR BLD: 27.3 % (ref 12–49)
MCH RBC QN AUTO: 29.1 PG (ref 26–34)
MCHC RBC AUTO-ENTMCNC: 32.9 G/DL (ref 30–36.5)
MCV RBC AUTO: 88.5 FL (ref 80–99)
MONOCYTES # BLD: 0.45 K/UL (ref 0–1)
MONOCYTES NFR BLD: 7.4 % (ref 5–13)
NEUTS SEG # BLD: 3.79 K/UL (ref 1.8–8)
NEUTS SEG NFR BLD: 62.4 % (ref 32–75)
NRBC # BLD: 0 K/UL (ref 0–0.01)
NRBC BLD-RTO: 0 PER 100 WBC
PLATELET # BLD AUTO: 277 K/UL (ref 150–400)
PMV BLD AUTO: 10.3 FL (ref 8.9–12.9)
POTASSIUM SERPL-SCNC: 4.7 MMOL/L (ref 3.5–5.1)
RBC # BLD AUTO: 4.6 M/UL (ref 3.8–5.2)
SODIUM SERPL-SCNC: 139 MMOL/L (ref 136–145)
TRIGL SERPL-MCNC: 100 MG/DL (ref 0–150)
VLDLC SERPL CALC-MCNC: 20 MG/DL
WBC # BLD AUTO: 6.1 K/UL (ref 3.6–11)

## 2025-08-29 PROCEDURE — 99204 OFFICE O/P NEW MOD 45 MIN: CPT | Performed by: OBSTETRICS & GYNECOLOGY

## 2025-08-29 RX ORDER — DOXYCYCLINE 100 MG/1
100 TABLET ORAL 2 TIMES DAILY
Qty: 28 TABLET | Refills: 0 | Status: SHIPPED | OUTPATIENT
Start: 2025-08-29 | End: 2025-09-12

## 2025-08-29 RX ORDER — METRONIDAZOLE 500 MG/1
500 TABLET ORAL 2 TIMES DAILY
Qty: 28 TABLET | Refills: 0 | Status: SHIPPED | OUTPATIENT
Start: 2025-08-29 | End: 2025-09-12

## 2025-09-02 ENCOUNTER — RESULTS FOLLOW-UP (OUTPATIENT)
Facility: CLINIC | Age: 40
End: 2025-09-02

## 2025-09-02 LAB
A VAGINAE DNA VAG QL NAA+PROBE: NORMAL SCORE
BVAB2 DNA VAG QL NAA+PROBE: NORMAL SCORE
C ALBICANS DNA VAG QL NAA+PROBE: NEGATIVE
C GLABRATA DNA VAG QL NAA+PROBE: NEGATIVE
C TRACH DNA SPEC QL NAA+PROBE: NEGATIVE
MEGA1 DNA VAG QL NAA+PROBE: NORMAL SCORE
N GONORRHOEA DNA VAG QL NAA+PROBE: NEGATIVE
T VAGINALIS DNA VAG QL NAA+PROBE: NEGATIVE

## (undated) DEVICE — CANNULA VAC 7MM CRV

## (undated) DEVICE — GLOVE SURG SZ 65 L12IN FNGR THK79MIL GRN LTX FREE

## (undated) DEVICE — GLOVE SURG SZ 65 THK91MIL LTX FREE SYN POLYISOPRENE

## (undated) DEVICE — GLOVE ORANGE PI 7   MSG9070

## (undated) DEVICE — GOWN,SIRUS,FABRNF,XL,20/CS: Brand: MEDLINE

## (undated) DEVICE — COVER LT HNDL PLAS RIG 1 PER PK

## (undated) DEVICE — TUBING SUCT L6FT ID0.5IN CLR PLAS M CONN

## (undated) DEVICE — SOLUTION IRRIG 500ML 0.9% SOD CHLO USP POUR PLAS BTL

## (undated) DEVICE — D&C MRMC: Brand: MEDLINE INDUSTRIES, INC.

## (undated) DEVICE — Device

## (undated) DEVICE — TRAP TISS DISP FOR COLL SYS BERK SAFETOUCH